# Patient Record
Sex: MALE | Race: WHITE | NOT HISPANIC OR LATINO | URBAN - METROPOLITAN AREA
[De-identification: names, ages, dates, MRNs, and addresses within clinical notes are randomized per-mention and may not be internally consistent; named-entity substitution may affect disease eponyms.]

---

## 2017-05-03 ENCOUNTER — OUTPATIENT (OUTPATIENT)
Dept: OUTPATIENT SERVICES | Facility: HOSPITAL | Age: 77
LOS: 1 days | End: 2017-05-03
Payer: COMMERCIAL

## 2017-05-03 DIAGNOSIS — R06.00 DYSPNEA, UNSPECIFIED: ICD-10-CM

## 2017-05-03 PROCEDURE — 78452 HT MUSCLE IMAGE SPECT MULT: CPT

## 2017-05-03 PROCEDURE — 93017 CV STRESS TEST TRACING ONLY: CPT

## 2017-05-03 PROCEDURE — A9502: CPT

## 2017-05-11 ENCOUNTER — INPATIENT (INPATIENT)
Facility: HOSPITAL | Age: 77
LOS: 0 days | Discharge: HOME | DRG: 247 | End: 2017-05-12
Attending: INTERNAL MEDICINE | Admitting: INTERNAL MEDICINE
Payer: COMMERCIAL

## 2017-05-11 ENCOUNTER — TRANSCRIPTION ENCOUNTER (OUTPATIENT)
Age: 77
End: 2017-05-11

## 2017-05-11 VITALS
DIASTOLIC BLOOD PRESSURE: 78 MMHG | SYSTOLIC BLOOD PRESSURE: 161 MMHG | HEART RATE: 50 BPM | RESPIRATION RATE: 18 BRPM | WEIGHT: 190.04 LBS | OXYGEN SATURATION: 99 % | TEMPERATURE: 98 F | HEIGHT: 70 IN

## 2017-05-11 DIAGNOSIS — R94.39 ABNORMAL RESULT OF OTHER CARDIOVASCULAR FUNCTION STUDY: ICD-10-CM

## 2017-05-11 DIAGNOSIS — Z95.5 PRESENCE OF CORONARY ANGIOPLASTY IMPLANT AND GRAFT: Chronic | ICD-10-CM

## 2017-05-11 LAB
ALBUMIN SERPL ELPH-MCNC: 4.5 G/DL — SIGNIFICANT CHANGE UP (ref 3.3–5)
ALP SERPL-CCNC: 49 U/L — SIGNIFICANT CHANGE UP (ref 40–120)
ALT FLD-CCNC: 26 U/L RC — SIGNIFICANT CHANGE UP (ref 10–45)
ANION GAP SERPL CALC-SCNC: 17 MMOL/L — SIGNIFICANT CHANGE UP (ref 5–17)
APTT BLD: 33.8 SEC — SIGNIFICANT CHANGE UP (ref 27.5–37.4)
AST SERPL-CCNC: 28 U/L — SIGNIFICANT CHANGE UP (ref 10–40)
BILIRUB SERPL-MCNC: 1.7 MG/DL — HIGH (ref 0.2–1.2)
BUN SERPL-MCNC: 17 MG/DL — SIGNIFICANT CHANGE UP (ref 7–23)
CALCIUM SERPL-MCNC: 10.1 MG/DL — SIGNIFICANT CHANGE UP (ref 8.4–10.5)
CHLORIDE SERPL-SCNC: 101 MMOL/L — SIGNIFICANT CHANGE UP (ref 96–108)
CO2 SERPL-SCNC: 23 MMOL/L — SIGNIFICANT CHANGE UP (ref 22–31)
CREAT SERPL-MCNC: 0.9 MG/DL — SIGNIFICANT CHANGE UP (ref 0.5–1.3)
GLUCOSE SERPL-MCNC: 163 MG/DL — HIGH (ref 70–99)
HCT VFR BLD CALC: 36.8 % — LOW (ref 39–50)
HGB BLD-MCNC: 12.5 G/DL — LOW (ref 13–17)
INR BLD: 1.42 RATIO — HIGH (ref 0.88–1.16)
MCHC RBC-ENTMCNC: 29 PG — SIGNIFICANT CHANGE UP (ref 27–34)
MCHC RBC-ENTMCNC: 33.9 GM/DL — SIGNIFICANT CHANGE UP (ref 32–36)
MCV RBC AUTO: 85.5 FL — SIGNIFICANT CHANGE UP (ref 80–100)
PLATELET # BLD AUTO: 213 K/UL — SIGNIFICANT CHANGE UP (ref 150–400)
POTASSIUM SERPL-MCNC: 4.2 MMOL/L — SIGNIFICANT CHANGE UP (ref 3.5–5.3)
POTASSIUM SERPL-SCNC: 4.2 MMOL/L — SIGNIFICANT CHANGE UP (ref 3.5–5.3)
PROT SERPL-MCNC: 7.1 G/DL — SIGNIFICANT CHANGE UP (ref 6–8.3)
PROTHROM AB SERPL-ACNC: 15.5 SEC — HIGH (ref 9.8–12.7)
RBC # BLD: 4.31 M/UL — SIGNIFICANT CHANGE UP (ref 4.2–5.8)
RBC # FLD: 15.9 % — HIGH (ref 10.3–14.5)
SODIUM SERPL-SCNC: 141 MMOL/L — SIGNIFICANT CHANGE UP (ref 135–145)
WBC # BLD: 8 K/UL — SIGNIFICANT CHANGE UP (ref 3.8–10.5)
WBC # FLD AUTO: 8 K/UL — SIGNIFICANT CHANGE UP (ref 3.8–10.5)

## 2017-05-11 PROCEDURE — 93010 ELECTROCARDIOGRAM REPORT: CPT

## 2017-05-11 RX ORDER — WARFARIN SODIUM 2.5 MG/1
5 TABLET ORAL ONCE
Qty: 0 | Refills: 0 | Status: DISCONTINUED | OUTPATIENT
Start: 2017-05-11 | End: 2017-05-11

## 2017-05-11 RX ORDER — INSULIN LISPRO 100/ML
VIAL (ML) SUBCUTANEOUS AT BEDTIME
Qty: 0 | Refills: 0 | Status: DISCONTINUED | OUTPATIENT
Start: 2017-05-11 | End: 2017-05-12

## 2017-05-11 RX ORDER — FLUTICASONE PROPIONATE 220 MCG
1 AEROSOL WITH ADAPTER (GRAM) INHALATION DAILY
Qty: 0 | Refills: 0 | Status: DISCONTINUED | OUTPATIENT
Start: 2017-05-11 | End: 2017-05-11

## 2017-05-11 RX ORDER — GLUCAGON INJECTION, SOLUTION 0.5 MG/.1ML
1 INJECTION, SOLUTION SUBCUTANEOUS ONCE
Qty: 0 | Refills: 0 | Status: DISCONTINUED | OUTPATIENT
Start: 2017-05-11 | End: 2017-05-12

## 2017-05-11 RX ORDER — INSULIN LISPRO 100/ML
VIAL (ML) SUBCUTANEOUS
Qty: 0 | Refills: 0 | Status: DISCONTINUED | OUTPATIENT
Start: 2017-05-11 | End: 2017-05-12

## 2017-05-11 RX ORDER — LISINOPRIL 2.5 MG/1
40 TABLET ORAL DAILY
Qty: 0 | Refills: 0 | Status: DISCONTINUED | OUTPATIENT
Start: 2017-05-11 | End: 2017-05-12

## 2017-05-11 RX ORDER — ATORVASTATIN CALCIUM 80 MG/1
40 TABLET, FILM COATED ORAL AT BEDTIME
Qty: 0 | Refills: 0 | Status: DISCONTINUED | OUTPATIENT
Start: 2017-05-11 | End: 2017-05-12

## 2017-05-11 RX ORDER — CLOPIDOGREL BISULFATE 75 MG/1
1 TABLET, FILM COATED ORAL
Qty: 90 | Refills: 3 | OUTPATIENT
Start: 2017-05-11 | End: 2018-05-05

## 2017-05-11 RX ORDER — DEXTROSE 50 % IN WATER 50 %
25 SYRINGE (ML) INTRAVENOUS ONCE
Qty: 0 | Refills: 0 | Status: DISCONTINUED | OUTPATIENT
Start: 2017-05-11 | End: 2017-05-12

## 2017-05-11 RX ORDER — CLOPIDOGREL BISULFATE 75 MG/1
75 TABLET, FILM COATED ORAL DAILY
Qty: 0 | Refills: 0 | Status: DISCONTINUED | OUTPATIENT
Start: 2017-05-12 | End: 2017-05-12

## 2017-05-11 RX ORDER — ASPIRIN/CALCIUM CARB/MAGNESIUM 324 MG
81 TABLET ORAL DAILY
Qty: 0 | Refills: 0 | Status: DISCONTINUED | OUTPATIENT
Start: 2017-05-11 | End: 2017-05-12

## 2017-05-11 RX ORDER — FLUTICASONE PROPIONATE 50 MCG
1 SPRAY, SUSPENSION NASAL DAILY
Qty: 0 | Refills: 0 | Status: DISCONTINUED | OUTPATIENT
Start: 2017-05-11 | End: 2017-05-12

## 2017-05-11 RX ORDER — DEXTROSE 50 % IN WATER 50 %
12.5 SYRINGE (ML) INTRAVENOUS ONCE
Qty: 0 | Refills: 0 | Status: DISCONTINUED | OUTPATIENT
Start: 2017-05-11 | End: 2017-05-12

## 2017-05-11 RX ORDER — METFORMIN HYDROCHLORIDE 850 MG/1
3 TABLET ORAL
Qty: 0 | Refills: 0 | COMMUNITY

## 2017-05-11 RX ORDER — METFORMIN HYDROCHLORIDE 850 MG/1
2 TABLET ORAL
Qty: 0 | Refills: 0 | COMMUNITY

## 2017-05-11 RX ORDER — METOPROLOL TARTRATE 50 MG
50 TABLET ORAL DAILY
Qty: 0 | Refills: 0 | Status: DISCONTINUED | OUTPATIENT
Start: 2017-05-11 | End: 2017-05-12

## 2017-05-11 RX ORDER — DEXTROSE 50 % IN WATER 50 %
1 SYRINGE (ML) INTRAVENOUS ONCE
Qty: 0 | Refills: 0 | Status: DISCONTINUED | OUTPATIENT
Start: 2017-05-11 | End: 2017-05-12

## 2017-05-11 RX ORDER — FUROSEMIDE 40 MG
20 TABLET ORAL DAILY
Qty: 0 | Refills: 0 | Status: DISCONTINUED | OUTPATIENT
Start: 2017-05-11 | End: 2017-05-12

## 2017-05-11 RX ORDER — AMLODIPINE BESYLATE 2.5 MG/1
5 TABLET ORAL DAILY
Qty: 0 | Refills: 0 | Status: DISCONTINUED | OUTPATIENT
Start: 2017-05-11 | End: 2017-05-12

## 2017-05-11 RX ORDER — SODIUM CHLORIDE 9 MG/ML
1000 INJECTION, SOLUTION INTRAVENOUS
Qty: 0 | Refills: 0 | Status: DISCONTINUED | OUTPATIENT
Start: 2017-05-11 | End: 2017-05-12

## 2017-05-11 RX ADMIN — Medication 2: at 18:15

## 2017-05-11 RX ADMIN — ATORVASTATIN CALCIUM 40 MILLIGRAM(S): 80 TABLET, FILM COATED ORAL at 22:05

## 2017-05-11 NOTE — DISCHARGE NOTE ADULT - HOSPITAL COURSE
77 yo male PMH afib on Coumadin last dose 5/7, CAD s/p 1 stent 16 years ago, HTN, HLD, cardiomegaly, DM type 2 managed by Dr. Jj Wilson A1c 7.5 uncomplicated, presented to cardiology, Dr. Russ, with progressing exertional dyspnea, mild  nonradiating 1-2/10 chest pressure, dizziness, peripheral edema over the past 6 months. Pt. denies palpitations, nausea, vomiting, diaphoresis, weight gain, or increased peripheral edema. Of note pt. states he has not been taking his Furosemide for the past week ue to urinary frequency. Pt. presents today asymptomatic for further evaluation and cardiac cath. 77 yo male PMH afib on Coumadin last dose 5/7, CAD s/p 1 stent 16 years ago, HTN, HLD, cardiomegaly, DM type 2 managed by Dr. Jj Wilson A1c 7.5 uncomplicated, presented to cardiology, Dr. Russ, with progressing exertional dyspnea, mild  nonradiating 1-2/10 chest pressure, dizziness, peripheral edema over the past 6 months. Pt. denies palpitations, nausea, vomiting, diaphoresis, weight gain, or increased peripheral edema. Of note pt. states he has not been taking his Furosemide for the past week ue to urinary frequency. Pt. presents today asymptomatic for further evaluation and cardiac cath.  Pt s/p PCI: DAYANA x1 to LAD via R radial. Pt tolerated procedure well with no post complications and hospitalization remained uneventful. Pt is hemodynamically stable and insertion/incision site benign. No c/o chest pain or SOB. Discharge teaching provided to Pt/caretaker and verbalized understanding the instruction. Pt is stable for discharge home as per attending.

## 2017-05-11 NOTE — DISCHARGE NOTE ADULT - CARE PLAN
Principal Discharge DX:	Coronary artery disease due to lipid rich plaque  Goal:	Pt remains chest pain free and understands post cath discharge instructions  Instructions for follow-up, activity and diet:	Do not stop you Aspirin or Plavix unless instructed to do so by your cardiologist.   No heavy lifting or pushing/pulling with procedure arm for 2 weeks. No driving for 2 days. You may shower 24 hours following the procedure but avoid baths/swimming for 1 week. Check your wrist site for bleeding and/or swelling daily following procedure and call your doctor immediately if it occurs or if you experience increased pain at the site. Follow up with your cardiologist in 1-2 weeks. You may call [facility] if you have any questions/concerns regarding your procedure [phone].   Lifestyle modification: include healthy habits to prevent stroke and future CAD  Low salt, low fat diet.   Weight management.   Take medications as prescribed.    No smoking.  Follow up with your cardiologist or primary doctor in 1- 2 weeks.  Secondary Diagnosis:	Chronic atrial fibrillation  Goal:	Your heart rate and rhythm will be controlled.  Instructions for follow-up, activity and diet:	Continue with your cardiologist and primary care MD. Continue your current medications. Call your physician for palpitations, feelings of rapid heart beat, lightheadedness, or dizziness. If you are on warfarin (Coumadin), have your blood work drawn (prescription given) on 5/11/17. Ask your nurse for written material about Coumadin and to provide patient education before discharge.  If you are on Xarelto/Rivaroxaban, take this medication daily as prescribed by your health care provider.   Take this medication with food to prevent upset stomach. If you miss a dose call your health care provider or pharmacist right away. Tell your doctor you use this drug before you have a spinal or epidural procedure  Tell dentists, surgeon, and other doctors that you use this drug. You may bleed more easily.  Be careful and avoid injury.  Use a soft toothbrush and an electric razor. Principal Discharge DX:	Coronary artery disease due to lipid rich plaque  Goal:	Pt remains chest pain free and understands post cath discharge instructions  Instructions for follow-up, activity and diet:	Do not stop you Aspirin or Plavix unless instructed to do so by your cardiologist.   No heavy lifting or pushing/pulling with procedure arm for 2 weeks. No driving for 2 days. You may shower 24 hours following the procedure but avoid baths/swimming for 1 week. Check your wrist site for bleeding and/or swelling daily following procedure and call your doctor immediately if it occurs or if you experience increased pain at the site. Follow up with your cardiologist in 1-2 weeks. You may call [facility] if you have any questions/concerns regarding your procedure [phone].   Lifestyle modification: include healthy habits to prevent stroke and future CAD  Low salt, low fat diet.   Weight management.   Take medications as prescribed.    No smoking.  Follow up with your cardiologist or primary doctor in 1- 2 weeks.  Secondary Diagnosis:	Chronic atrial fibrillation  Goal:	Your heart rate and rhythm will be controlled.  Instructions for follow-up, activity and diet:	Continue with your cardiologist and primary care MD. Continue your current medications. Call your physician for palpitations, feelings of rapid heart beat, lightheadedness, or dizziness. If you are on warfarin (Coumadin), have your blood work drawn (prescription given) on 5/11/17. Ask your nurse for written material about Coumadin and to provide patient education before discharge.  If you are on Xarelto/Rivaroxaban, take this medication daily as prescribed by your health care provider.   Take this medication with food to prevent upset stomach. If you miss a dose call your health care provider or pharmacist right away. Tell your doctor you use this drug before you have a spinal or epidural procedure  Tell dentists, surgeon, and other doctors that you use this drug. You may bleed more easily.  Be careful and avoid injury.  Use a soft toothbrush and an electric razor. Principal Discharge DX:	Coronary artery disease due to lipid rich plaque  Goal:	Pt remains chest pain free and understands post cath discharge instructions  Instructions for follow-up, activity and diet:	Do not stop you Aspirin or Plavix unless instructed to do so by your cardiologist.   No heavy lifting or pushing/pulling with procedure arm for 2 weeks. No driving for 2 days. You may shower 24 hours following the procedure but avoid baths/swimming for 1 week. Check your wrist site for bleeding and/or swelling daily following procedure and call your doctor immediately if it occurs or if you experience increased pain at the site. Follow up with your cardiologist in 1-2 weeks. You may call [facility] if you have any questions/concerns regarding your procedure [phone].   Lifestyle modification: include healthy habits to prevent stroke and future CAD  Low salt, low fat diet.   Weight management.   Take medications as prescribed.    No smoking.  Follow up with your cardiologist or primary doctor in 1- 2 weeks.  Secondary Diagnosis:	Chronic atrial fibrillation  Goal:	Your heart rate and rhythm will be controlled.  Instructions for follow-up, activity and diet:	Continue with your cardiologist and primary care MD. Continue your current medications. Call your physician for palpitations, feelings of rapid heart beat, lightheadedness, or dizziness. If you are on warfarin (Coumadin), have your blood work drawn (prescription given) on 5/11/17. Ask your nurse for written material about Coumadin and to provide patient education before discharge.  If you are on Xarelto/Rivaroxaban, take this medication daily as prescribed by your health care provider.   Take this medication with food to prevent upset stomach. If you miss a dose call your health care provider or pharmacist right away. Tell your doctor you use this drug before you have a spinal or epidural procedure  Tell dentists, surgeon, and other doctors that you use this drug. You may bleed more easily.  Be careful and avoid injury.  Use a soft toothbrush and an electric razor. Principal Discharge DX:	Coronary artery disease due to lipid rich plaque  Goal:	Pt remains chest pain free and understands post cath discharge instructions  Instructions for follow-up, activity and diet:	Do not stop you Aspirin or Plavix unless instructed to do so by your cardiologist.   No heavy lifting or pushing/pulling with procedure arm for 2 weeks. No driving for 2 days. You may shower 24 hours following the procedure but avoid baths/swimming for 1 week. Check your wrist site for bleeding and/or swelling daily following procedure and call your doctor immediately if it occurs or if you experience increased pain at the site. Follow up with your cardiologist in 1-2 weeks. You may call [facility] if you have any questions/concerns regarding your procedure [phone].   Lifestyle modification: include healthy habits to prevent stroke and future CAD  Low salt, low fat diet.   Weight management.   Take medications as prescribed.    No smoking.  Follow up with your cardiologist or primary doctor in 1- 2 weeks.  Secondary Diagnosis:	Chronic atrial fibrillation  Goal:	Your heart rate and rhythm will be controlled.  Instructions for follow-up, activity and diet:	Continue with your cardiologist and primary care MD. Continue your current medications. Call your physician for palpitations, feelings of rapid heart beat, lightheadedness, or dizziness. If you are on warfarin (Coumadin), have your blood work drawn (prescription given) on 5/11/17. Ask your nurse for written material about Coumadin and to provide patient education before discharge.  If you are on Xarelto/Rivaroxaban, take this medication daily as prescribed by your health care provider.   Take this medication with food to prevent upset stomach. If you miss a dose call your health care provider or pharmacist right away. Tell your doctor you use this drug before you have a spinal or epidural procedure  Tell dentists, surgeon, and other doctors that you use this drug. You may bleed more easily.  Be careful and avoid injury.  Use a soft toothbrush and an electric razor. Principal Discharge DX:	Coronary artery disease due to lipid rich plaque  Goal:	Pt remains chest pain free and understands post cath discharge instructions  Instructions for follow-up, activity and diet:	Do not stop you Aspirin or Plavix unless instructed to do so by your cardiologist.   No heavy lifting or pushing/pulling with procedure arm for 2 weeks. No driving for 2 days. You may shower 24 hours following the procedure but avoid baths/swimming for 1 week. Check your wrist site for bleeding and/or swelling daily following procedure and call your doctor immediately if it occurs or if you experience increased pain at the site. Follow up with your cardiologist in 1-2 weeks. You may call [facility] if you have any questions/concerns regarding your procedure [phone].   Lifestyle modification: include healthy habits to prevent stroke and future CAD  Low salt, low fat diet.   Weight management.   Take medications as prescribed.    No smoking.  Follow up with your cardiologist or primary doctor in 1- 2 weeks.  Secondary Diagnosis:	Chronic atrial fibrillation  Goal:	Your heart rate and rhythm will be controlled.  Instructions for follow-up, activity and diet:	Continue with your cardiologist and primary care MD. Continue your current medications. Call your physician for palpitations, feelings of rapid heart beat, lightheadedness, or dizziness. If you are on warfarin (Coumadin), have your blood work drawn (prescription given) on 5/11/17. Ask your nurse for written material about Coumadin and to provide patient education before discharge.  If you are on Xarelto/Rivaroxaban, take this medication daily as prescribed by your health care provider.   Take this medication with food to prevent upset stomach. If you miss a dose call your health care provider or pharmacist right away. Tell your doctor you use this drug before you have a spinal or epidural procedure  Tell dentists, surgeon, and other doctors that you use this drug. You may bleed more easily.  Be careful and avoid injury.  Use a soft toothbrush and an electric razor. Principal Discharge DX:	Coronary artery disease due to lipid rich plaque  Goal:	Pt remains chest pain free and understands post cath discharge instructions  Instructions for follow-up, activity and diet:	Do not stop you Aspirin or Plavix unless instructed to do so by your cardiologist.   No heavy lifting or pushing/pulling with procedure arm for 2 weeks. No driving for 2 days. You may shower 24 hours following the procedure but avoid baths/swimming for 1 week. Check your wrist site for bleeding and/or swelling daily following procedure and call your doctor immediately if it occurs or if you experience increased pain at the site. Follow up with your cardiologist in 1-2 weeks. You may call [facility] if you have any questions/concerns regarding your procedure [phone].   Lifestyle modification: include healthy habits to prevent stroke and future CAD  Low salt, low fat diet.   Weight management.   Take medications as prescribed.    No smoking.  Follow up with your cardiologist or primary doctor in 1- 2 weeks.  Secondary Diagnosis:	Chronic atrial fibrillation  Goal:	Your heart rate and rhythm will be controlled.  Instructions for follow-up, activity and diet:	Continue with your cardiologist and primary care MD. Continue your current medications. Call your physician for palpitations, feelings of rapid heart beat, lightheadedness, or dizziness. If you are on warfarin (Coumadin), have your blood work drawn (prescription given) on 5/11/17. Ask your nurse for written material about Coumadin and to provide patient education before discharge.  If you are on Xarelto/Rivaroxaban, take this medication daily as prescribed by your health care provider.   Take this medication with food to prevent upset stomach. If you miss a dose call your health care provider or pharmacist right away. Tell your doctor you use this drug before you have a spinal or epidural procedure  Tell dentists, surgeon, and other doctors that you use this drug. You may bleed more easily.  Be careful and avoid injury.  Use a soft toothbrush and an electric razor.

## 2017-05-11 NOTE — DISCHARGE NOTE ADULT - MEDICATION SUMMARY - MEDICATIONS TO CHANGE
I will SWITCH the dose or number of times a day I take the medications listed below when I get home from the hospital:  None I will SWITCH the dose or number of times a day I take the medications listed below when I get home from the hospital:    Metoprolol Succinate ER 50 mg oral tablet, extended release  -- 1 tab(s) by mouth once a day

## 2017-05-11 NOTE — H&P CARDIOLOGY - HISTORY OF PRESENT ILLNESS
This is a 75 yo male PMH afib on Coumadin last dose 5/7, CAD s/p 1 stent 16 years ago, HTN, HLD, cardiomegaly, DM type 2 managed by Dr. Jj Wilson A1c 7.5 uncomplicated, presented to cardiology, Dr. Russ, with progressing exertional dyspnea, mild  nonradiating 1-2/10 chest pressure, dizziness, peripheral edema over the past 6 months. Pt. denies palpitations, nausea, vomiting, diaphoresis, weight gain, or increased peripheral edema. Pt. presents today asymptomatic for further evaluation and cardiac cath.    NST performed performed 5/3/17 revealed  NUCLEAR FINDINGS:  Review of raw data shows: Diaphragmatic artifact.  The left ventricle was normal in size. There is a medium  sized, moderate to severe defect in anterior wall that is  minimally reversible, suggestive of infarction with  cori-infarct ischemia.There is also a medium sized,  moderate to severe defect in inferior wall that is  partially reversible, suggestive of infarction with  cori-infarct ischemia.  ------------------------------------------------------------------------  GATED ANALYSIS:  Global hypokinesis with severe hypokinesis of mid and  distal inferior wall. Left ventricular dysfunction: 40%.  ------------------------------------------------------------------------  LV/RV OBSERVATION:  Moderately depressed LVEF  ------------------------------------------------------------------------  IMPRESSIONS:Abnormal Study  * The left ventricle was normal in size.  * Tracer uptake was homogeneous throughout the left  ventricle.  * Abnormal study; There is a medium sized, moderate to  severe defect in anterior wall that is minimally  reversible, suggestive of infarction with cori-infarct  ischemia.There is also a medium sized, moderate to severe  defect in inferior wall that is partially reversible,  suggestive of infarction with cori-infarct ischemia.  * Global hypokinesis with severe hypokinesis of mid and  distal inferior wall. Left ventricular dysfunction: 40%.  * Normal Regadenoson ECG. This is a 75 yo male PMH afib on Coumadin last dose 5/7, CAD s/p 1 stent 16 years ago, HTN, HLD, cardiomegaly, DM type 2 managed by Dr. Jj Wilson A1c 7.5 uncomplicated, presented to cardiology, Dr. Russ, with progressing exertional dyspnea, mild  nonradiating 1-2/10 chest pressure, dizziness, peripheral edema over the past 6 months. Pt. denies palpitations, nausea, vomiting, diaphoresis, weight gain, or increased peripheral edema. Pt. presents today asymptomatic for further evaluation and cardiac cath.    Antianginal regimen:  Metoprolol, Amlodipine    NST performed performed 5/3/17 revealed  NUCLEAR FINDINGS:  Review of raw data shows: Diaphragmatic artifact.  The left ventricle was normal in size. There is a medium  sized, moderate to severe defect in anterior wall that is  minimally reversible, suggestive of infarction with  cori-infarct ischemia.There is also a medium sized,  moderate to severe defect in inferior wall that is  partially reversible, suggestive of infarction with  cori-infarct ischemia.  ------------------------------------------------------------------------  GATED ANALYSIS:  Global hypokinesis with severe hypokinesis of mid and  distal inferior wall. Left ventricular dysfunction: 40%.  ------------------------------------------------------------------------  LV/RV OBSERVATION:  Moderately depressed LVEF  ------------------------------------------------------------------------  IMPRESSIONS:Abnormal Study  * The left ventricle was normal in size.  * Tracer uptake was homogeneous throughout the left  ventricle.  * Abnormal study; There is a medium sized, moderate to  severe defect in anterior wall that is minimally  reversible, suggestive of infarction with cori-infarct  ischemia.There is also a medium sized, moderate to severe  defect in inferior wall that is partially reversible,  suggestive of infarction with cori-infarct ischemia.  * Global hypokinesis with severe hypokinesis of mid and  distal inferior wall. Left ventricular dysfunction: 40%.  * Normal Regadenoson ECG. This is a 75 yo male PMH afib on Coumadin last dose 5/7, CAD s/p 1 stent 16 years ago, HTN, HLD, cardiomegaly, DM type 2 managed by Dr. Jj Wilson A1c 7.5 uncomplicated, presented to cardiology, Dr. Russ, with progressing exertional dyspnea, mild  nonradiating 1-2/10 chest pressure, dizziness, peripheral edema over the past 6 months. Pt. denies palpitations, nausea, vomiting, diaphoresis, weight gain, or increased peripheral edema. Of note pt. states he has not been taking his Furosemide for the past week ue to urinary frequency. Pt. presents today asymptomatic for further evaluation and cardiac cath.    Antianginal regimen:  Metoprolol, Amlodipine    NST performed performed 5/3/17 revealed  NUCLEAR FINDINGS:  Review of raw data shows: Diaphragmatic artifact.  The left ventricle was normal in size. There is a medium  sized, moderate to severe defect in anterior wall that is  minimally reversible, suggestive of infarction with  cori-infarct ischemia.There is also a medium sized,  moderate to severe defect in inferior wall that is  partially reversible, suggestive of infarction with  cori-infarct ischemia.  ------------------------------------------------------------------------  GATED ANALYSIS:  Global hypokinesis with severe hypokinesis of mid and  distal inferior wall. Left ventricular dysfunction: 40%.  ------------------------------------------------------------------------  LV/RV OBSERVATION:  Moderately depressed LVEF  ------------------------------------------------------------------------  IMPRESSIONS:Abnormal Study  * The left ventricle was normal in size.  * Tracer uptake was homogeneous throughout the left  ventricle.  * Abnormal study; There is a medium sized, moderate to  severe defect in anterior wall that is minimally  reversible, suggestive of infarction with cori-infarct  ischemia.There is also a medium sized, moderate to severe  defect in inferior wall that is partially reversible,  suggestive of infarction with cori-infarct ischemia.  * Global hypokinesis with severe hypokinesis of mid and  distal inferior wall. Left ventricular dysfunction: 40%.  * Normal Regadenoson ECG.

## 2017-05-11 NOTE — DISCHARGE NOTE ADULT - CARE PROVIDER_API CALL
Alex Russ), Cardiovascular Disease; Internal Medicine  [address] [place] [address]  Phone: [phone]  Fax: [phone]

## 2017-05-11 NOTE — DISCHARGE NOTE ADULT - MEDICATION SUMMARY - MEDICATIONS TO TAKE
I will START or STAY ON the medications listed below when I get home from the hospital:    sildenafil 50 mg oral tablet  -- 1 tab(s) by mouth once a day, As Needed  -- Indication: For home med    aspirin 81 mg oral tablet, chewable  -- 1 tab(s) by mouth once a day  -- Indication: For CAD (coronary artery disease)    lisinopril 40 mg oral tablet  -- 1 tab(s) by mouth once a day  -- Indication: For htn    Coumadin  -- 6 milligram(s) by mouth once a day  Fri/Sat/Sun  -- Indication: For afib    Coumadin 5 mg oral tablet  -- 1 tab(s) by mouth once a day  Mon/Tues/Wed/Thurs  -- Indication: For afib    pioglitazone 15 mg oral tablet  -- 1 tab(s) by mouth once a day  last dose 5/10/17  -- Indication: For dm    repaglinide 1 mg oral tablet  -- 1 tab(s) by mouth 3 times a day (before meals)  last dose 5/10/17  -- Indication: For dm    metFORMIN 500 mg oral tablet  -- 2 tab(s) by mouth once a day (at bedtime)  Restart on 5/14/17  -- Indication: For dm    metFORMIN 500 mg oral tablet  -- 3 tab(s) by mouth once a day AM dose  Restart on 5/14/17  -- Indication: For dm    atorvastatin 40 mg oral tablet  -- 1 tab(s) by mouth once a day  -- Indication: For hld    clopidogrel 75 mg oral tablet  -- 1 tab(s) by mouth once a day MDD:1  -- Indication: For CAD (coronary artery disease)    Metoprolol Succinate ER 50 mg oral tablet, extended release  -- 1 tab(s) by mouth once a day  -- Indication: For htn    amLODIPine 5 mg oral tablet  -- 1 tab(s) by mouth once a day  -- Indication: For htn    furosemide 20 mg oral tablet  -- 1 tab(s) by mouth once a day  Mon/Wed/Fridays  -- Indication: For htn    fluticasone propionate  -- 50 microgram(s) into nose once a day, As Needed  -- Indication: For home med I will START or STAY ON the medications listed below when I get home from the hospital:    sildenafil 50 mg oral tablet  -- 1 tab(s) by mouth once a day, As Needed  -- Indication: For home med    aspirin 81 mg oral tablet, chewable  -- 1 tab(s) by mouth once a day  -- Indication: For CAD (coronary artery disease)    lisinopril 40 mg oral tablet  -- 1 tab(s) by mouth once a day  -- Indication: For htn    Coumadin  -- 6 milligram(s) by mouth once a day  Fri/Sat/Sun  -- Indication: For afib    Coumadin 5 mg oral tablet  -- 1 tab(s) by mouth once a day  Mon/Tues/Wed/Thurs  -- Indication: For afib    pioglitazone 15 mg oral tablet  -- 1 tab(s) by mouth once a day  last dose 5/10/17  -- Indication: For dm    repaglinide 1 mg oral tablet  -- 1 tab(s) by mouth 3 times a day (before meals)  last dose 5/10/17  -- Indication: For dm    metFORMIN 500 mg oral tablet  -- 2 tab(s) by mouth once a day (at bedtime)  Restart on 5/14/17  -- Indication: For dm    metFORMIN 500 mg oral tablet  -- 3 tab(s) by mouth once a day AM dose  Restart on 5/14/17  -- Indication: For dm    atorvastatin 40 mg oral tablet  -- 1 tab(s) by mouth once a day  -- Indication: For hld    clopidogrel 75 mg oral tablet  -- 1 tab(s) by mouth once a day MDD:1  -- Indication: For CAD (coronary artery disease)    metoprolol succinate 25 mg oral tablet, extended release  -- 1 tab(s) by mouth once a day MDD:1  -- Indication: For Htn    amLODIPine 5 mg oral tablet  -- 1 tab(s) by mouth once a day  -- Indication: For htn    furosemide 20 mg oral tablet  -- 1 tab(s) by mouth once a day  Mon/Wed/Fridays  -- Indication: For htn    fluticasone propionate  -- 50 microgram(s) into nose once a day, As Needed  -- Indication: For home med

## 2017-05-11 NOTE — DISCHARGE NOTE ADULT - PLAN OF CARE
Pt remains chest pain free and understands post cath discharge instructions Do not stop you Aspirin or Plavix unless instructed to do so by your cardiologist.   No heavy lifting or pushing/pulling with procedure arm for 2 weeks. No driving for 2 days. You may shower 24 hours following the procedure but avoid baths/swimming for 1 week. Check your wrist site for bleeding and/or swelling daily following procedure and call your doctor immediately if it occurs or if you experience increased pain at the site. Follow up with your cardiologist in 1-2 weeks. You may call [facility] if you have any questions/concerns regarding your procedure [phone].   Lifestyle modification: include healthy habits to prevent stroke and future CAD  Low salt, low fat diet.   Weight management.   Take medications as prescribed.    No smoking.  Follow up with your cardiologist or primary doctor in 1- 2 weeks. Your heart rate and rhythm will be controlled. Continue with your cardiologist and primary care MD. Continue your current medications. Call your physician for palpitations, feelings of rapid heart beat, lightheadedness, or dizziness. If you are on warfarin (Coumadin), have your blood work drawn (prescription given) on 5/11/17. Ask your nurse for written material about Coumadin and to provide patient education before discharge.  If you are on Xarelto/Rivaroxaban, take this medication daily as prescribed by your health care provider.   Take this medication with food to prevent upset stomach. If you miss a dose call your health care provider or pharmacist right away. Tell your doctor you use this drug before you have a spinal or epidural procedure  Tell dentists, surgeon, and other doctors that you use this drug. You may bleed more easily.  Be careful and avoid injury.  Use a soft toothbrush and an electric razor.

## 2017-05-11 NOTE — DISCHARGE NOTE ADULT - PATIENT PORTAL LINK FT
“You can access the Follow[employer] Patient Portal, offered by [facility], by registering with the following website: [email or web address]”

## 2017-05-11 NOTE — H&P CARDIOLOGY - PMH
Afib    CAD (coronary artery disease)    Diabetes    High cholesterol    HTN (hypertension)    Prostatitis syndrome    Stented coronary artery    UTI (urinary tract infection)

## 2017-05-12 VITALS
TEMPERATURE: 98 F | SYSTOLIC BLOOD PRESSURE: 129 MMHG | OXYGEN SATURATION: 95 % | RESPIRATION RATE: 17 BRPM | DIASTOLIC BLOOD PRESSURE: 72 MMHG | HEART RATE: 45 BPM

## 2017-05-12 LAB
APTT BLD: 32.7 SEC — SIGNIFICANT CHANGE UP (ref 27.5–37.4)
BASOPHILS # BLD AUTO: 0.1 K/UL — SIGNIFICANT CHANGE UP (ref 0–0.2)
BASOPHILS NFR BLD AUTO: 0.9 % — SIGNIFICANT CHANGE UP (ref 0–2)
BUN SERPL-MCNC: 14 MG/DL — SIGNIFICANT CHANGE UP (ref 7–23)
CALCIUM SERPL-MCNC: 8.6 MG/DL — SIGNIFICANT CHANGE UP (ref 8.4–10.5)
CHLORIDE SERPL-SCNC: 106 MMOL/L — SIGNIFICANT CHANGE UP (ref 96–108)
CO2 SERPL-SCNC: 21 MMOL/L — LOW (ref 22–31)
CREAT SERPL-MCNC: 0.81 MG/DL — SIGNIFICANT CHANGE UP (ref 0.5–1.3)
EOSINOPHIL # BLD AUTO: 0.4 K/UL — SIGNIFICANT CHANGE UP (ref 0–0.5)
EOSINOPHIL NFR BLD AUTO: 5.4 % — SIGNIFICANT CHANGE UP (ref 0–6)
GLUCOSE SERPL-MCNC: 132 MG/DL — HIGH (ref 70–99)
HBA1C BLD-MCNC: 6.7 % — HIGH (ref 4–5.6)
HBA1C BLD-MCNC: 7 % — HIGH (ref 4–5.6)
HCT VFR BLD CALC: 35.1 % — LOW (ref 39–50)
HGB BLD-MCNC: 11.6 G/DL — LOW (ref 13–17)
INR BLD: 1.36 RATIO — HIGH (ref 0.88–1.16)
LYMPHOCYTES # BLD AUTO: 1.4 K/UL — SIGNIFICANT CHANGE UP (ref 1–3.3)
LYMPHOCYTES # BLD AUTO: 21.2 % — SIGNIFICANT CHANGE UP (ref 13–44)
MCHC RBC-ENTMCNC: 29.1 PG — SIGNIFICANT CHANGE UP (ref 27–34)
MCHC RBC-ENTMCNC: 33 GM/DL — SIGNIFICANT CHANGE UP (ref 32–36)
MCV RBC AUTO: 88.1 FL — SIGNIFICANT CHANGE UP (ref 80–100)
MONOCYTES # BLD AUTO: 0.7 K/UL — SIGNIFICANT CHANGE UP (ref 0–0.9)
MONOCYTES NFR BLD AUTO: 10.1 % — SIGNIFICANT CHANGE UP (ref 2–14)
NEUTROPHILS # BLD AUTO: 4.1 K/UL — SIGNIFICANT CHANGE UP (ref 1.8–7.4)
NEUTROPHILS NFR BLD AUTO: 62.5 % — SIGNIFICANT CHANGE UP (ref 43–77)
PLATELET # BLD AUTO: 200 K/UL — SIGNIFICANT CHANGE UP (ref 150–400)
POTASSIUM SERPL-MCNC: 3.6 MMOL/L — SIGNIFICANT CHANGE UP (ref 3.5–5.3)
POTASSIUM SERPL-SCNC: 3.6 MMOL/L — SIGNIFICANT CHANGE UP (ref 3.5–5.3)
PROTHROM AB SERPL-ACNC: 14.8 SEC — HIGH (ref 9.8–12.7)
RBC # BLD: 3.98 M/UL — LOW (ref 4.2–5.8)
RBC # FLD: 15.9 % — HIGH (ref 10.3–14.5)
SODIUM SERPL-SCNC: 141 MMOL/L — SIGNIFICANT CHANGE UP (ref 135–145)
WBC # BLD: 6.6 K/UL — SIGNIFICANT CHANGE UP (ref 3.8–10.5)
WBC # FLD AUTO: 6.6 K/UL — SIGNIFICANT CHANGE UP (ref 3.8–10.5)

## 2017-05-12 PROCEDURE — C1725: CPT

## 2017-05-12 PROCEDURE — C1894: CPT

## 2017-05-12 PROCEDURE — 85730 THROMBOPLASTIN TIME PARTIAL: CPT

## 2017-05-12 PROCEDURE — 93005 ELECTROCARDIOGRAM TRACING: CPT

## 2017-05-12 PROCEDURE — 85610 PROTHROMBIN TIME: CPT

## 2017-05-12 PROCEDURE — 80053 COMPREHEN METABOLIC PANEL: CPT

## 2017-05-12 PROCEDURE — 93454 CORONARY ARTERY ANGIO S&I: CPT | Mod: 59

## 2017-05-12 PROCEDURE — C9600: CPT | Mod: LD

## 2017-05-12 PROCEDURE — C1887: CPT

## 2017-05-12 PROCEDURE — 85027 COMPLETE CBC AUTOMATED: CPT

## 2017-05-12 PROCEDURE — 93010 ELECTROCARDIOGRAM REPORT: CPT

## 2017-05-12 PROCEDURE — 80048 BASIC METABOLIC PNL TOTAL CA: CPT

## 2017-05-12 PROCEDURE — C1874: CPT

## 2017-05-12 PROCEDURE — C1769: CPT

## 2017-05-12 PROCEDURE — 83036 HEMOGLOBIN GLYCOSYLATED A1C: CPT

## 2017-05-12 RX ORDER — METOPROLOL TARTRATE 50 MG
25 TABLET ORAL DAILY
Qty: 0 | Refills: 0 | Status: DISCONTINUED | OUTPATIENT
Start: 2017-05-12 | End: 2017-05-12

## 2017-05-12 RX ORDER — POTASSIUM CHLORIDE 20 MEQ
40 PACKET (EA) ORAL ONCE
Qty: 0 | Refills: 0 | Status: COMPLETED | OUTPATIENT
Start: 2017-05-12 | End: 2017-05-12

## 2017-05-12 RX ORDER — CLOPIDOGREL BISULFATE 75 MG/1
1 TABLET, FILM COATED ORAL
Qty: 90 | Refills: 3 | OUTPATIENT
Start: 2017-05-12 | End: 2018-05-06

## 2017-05-12 RX ORDER — METOPROLOL TARTRATE 50 MG
1 TABLET ORAL
Qty: 0 | Refills: 0 | COMMUNITY

## 2017-05-12 RX ORDER — METOPROLOL TARTRATE 50 MG
1 TABLET ORAL
Qty: 30 | Refills: 0 | OUTPATIENT
Start: 2017-05-12 | End: 2017-06-11

## 2017-05-12 RX ADMIN — Medication 81 MILLIGRAM(S): at 05:59

## 2017-05-12 RX ADMIN — CLOPIDOGREL BISULFATE 75 MILLIGRAM(S): 75 TABLET, FILM COATED ORAL at 06:01

## 2017-05-12 RX ADMIN — Medication 40 MILLIEQUIVALENT(S): at 06:00

## 2017-05-12 RX ADMIN — AMLODIPINE BESYLATE 5 MILLIGRAM(S): 2.5 TABLET ORAL at 05:59

## 2018-05-29 PROBLEM — N39.0 URINARY TRACT INFECTION, SITE NOT SPECIFIED: Chronic | Status: ACTIVE | Noted: 2017-05-11

## 2018-05-29 PROBLEM — I25.10 ATHEROSCLEROTIC HEART DISEASE OF NATIVE CORONARY ARTERY WITHOUT ANGINA PECTORIS: Chronic | Status: ACTIVE | Noted: 2017-05-11

## 2018-05-29 PROBLEM — N42.82: Chronic | Status: ACTIVE | Noted: 2017-05-11

## 2018-05-30 ENCOUNTER — APPOINTMENT (OUTPATIENT)
Dept: UROLOGY | Facility: CLINIC | Age: 78
End: 2018-05-30
Payer: COMMERCIAL

## 2018-05-30 DIAGNOSIS — N40.0 BENIGN PROSTATIC HYPERPLASIA WITHOUT LOWER URINARY TRACT SYMPMS: ICD-10-CM

## 2018-05-30 DIAGNOSIS — N45.1 EPIDIDYMITIS: ICD-10-CM

## 2018-05-30 PROCEDURE — 99213 OFFICE O/P EST LOW 20 MIN: CPT

## 2018-05-30 RX ORDER — FINASTERIDE 5 MG/1
5 TABLET, FILM COATED ORAL
Qty: 90 | Refills: 2 | Status: ACTIVE | COMMUNITY
Start: 2018-05-30 | End: 1900-01-01

## 2018-11-30 ENCOUNTER — APPOINTMENT (OUTPATIENT)
Dept: UROLOGY | Facility: CLINIC | Age: 78
End: 2018-11-30

## 2019-01-15 ENCOUNTER — INPATIENT (INPATIENT)
Facility: HOSPITAL | Age: 79
LOS: 13 days | DRG: 291 | End: 2019-01-29
Attending: STUDENT IN AN ORGANIZED HEALTH CARE EDUCATION/TRAINING PROGRAM | Admitting: HOSPITALIST
Payer: COMMERCIAL

## 2019-01-15 VITALS
RESPIRATION RATE: 18 BRPM | DIASTOLIC BLOOD PRESSURE: 70 MMHG | WEIGHT: 184.97 LBS | HEIGHT: 70 IN | SYSTOLIC BLOOD PRESSURE: 139 MMHG | HEART RATE: 62 BPM | OXYGEN SATURATION: 98 %

## 2019-01-15 DIAGNOSIS — I10 ESSENTIAL (PRIMARY) HYPERTENSION: ICD-10-CM

## 2019-01-15 DIAGNOSIS — Z95.5 PRESENCE OF CORONARY ANGIOPLASTY IMPLANT AND GRAFT: Chronic | ICD-10-CM

## 2019-01-15 DIAGNOSIS — E87.2 ACIDOSIS: ICD-10-CM

## 2019-01-15 DIAGNOSIS — I50.9 HEART FAILURE, UNSPECIFIED: ICD-10-CM

## 2019-01-15 DIAGNOSIS — R60.9 EDEMA, UNSPECIFIED: ICD-10-CM

## 2019-01-15 DIAGNOSIS — N17.9 ACUTE KIDNEY FAILURE, UNSPECIFIED: ICD-10-CM

## 2019-01-15 LAB
ALBUMIN SERPL ELPH-MCNC: 3.7 G/DL — SIGNIFICANT CHANGE UP (ref 3.3–5)
ALP SERPL-CCNC: 85 U/L — SIGNIFICANT CHANGE UP (ref 40–120)
ALT FLD-CCNC: 43 U/L — SIGNIFICANT CHANGE UP (ref 10–45)
ANION GAP SERPL CALC-SCNC: 18 MMOL/L — HIGH (ref 5–17)
APTT BLD: 58.8 SEC — HIGH (ref 27.5–36.3)
AST SERPL-CCNC: 42 U/L — HIGH (ref 10–40)
BASOPHILS # BLD AUTO: 0 K/UL — SIGNIFICANT CHANGE UP (ref 0–0.2)
BASOPHILS NFR BLD AUTO: 0.3 % — SIGNIFICANT CHANGE UP (ref 0–2)
BILIRUB SERPL-MCNC: 1.8 MG/DL — HIGH (ref 0.2–1.2)
BUN SERPL-MCNC: 68 MG/DL — HIGH (ref 7–23)
CALCIUM SERPL-MCNC: 9.2 MG/DL — SIGNIFICANT CHANGE UP (ref 8.4–10.5)
CHLORIDE SERPL-SCNC: 109 MMOL/L — HIGH (ref 96–108)
CO2 SERPL-SCNC: 14 MMOL/L — LOW (ref 22–31)
CREAT SERPL-MCNC: 3.74 MG/DL — HIGH (ref 0.5–1.3)
EOSINOPHIL # BLD AUTO: 0.1 K/UL — SIGNIFICANT CHANGE UP (ref 0–0.5)
EOSINOPHIL NFR BLD AUTO: 2.1 % — SIGNIFICANT CHANGE UP (ref 0–6)
GAS PNL BLDV: SIGNIFICANT CHANGE UP
GLUCOSE SERPL-MCNC: 133 MG/DL — HIGH (ref 70–99)
HCT VFR BLD CALC: 33.6 % — LOW (ref 39–50)
HGB BLD-MCNC: 11 G/DL — LOW (ref 13–17)
INR BLD: 6.7 RATIO — CRITICAL HIGH (ref 0.88–1.16)
LYMPHOCYTES # BLD AUTO: 1.1 K/UL — SIGNIFICANT CHANGE UP (ref 1–3.3)
LYMPHOCYTES # BLD AUTO: 16.5 % — SIGNIFICANT CHANGE UP (ref 13–44)
MCHC RBC-ENTMCNC: 30.3 PG — SIGNIFICANT CHANGE UP (ref 27–34)
MCHC RBC-ENTMCNC: 32.8 GM/DL — SIGNIFICANT CHANGE UP (ref 32–36)
MCV RBC AUTO: 92.2 FL — SIGNIFICANT CHANGE UP (ref 80–100)
MONOCYTES # BLD AUTO: 0.6 K/UL — SIGNIFICANT CHANGE UP (ref 0–0.9)
MONOCYTES NFR BLD AUTO: 9.2 % — SIGNIFICANT CHANGE UP (ref 2–14)
NEUTROPHILS # BLD AUTO: 4.8 K/UL — SIGNIFICANT CHANGE UP (ref 1.8–7.4)
NEUTROPHILS NFR BLD AUTO: 72 % — SIGNIFICANT CHANGE UP (ref 43–77)
NT-PROBNP SERPL-SCNC: HIGH PG/ML (ref 0–300)
PLATELET # BLD AUTO: 208 K/UL — SIGNIFICANT CHANGE UP (ref 150–400)
POTASSIUM SERPL-MCNC: 4.1 MMOL/L — SIGNIFICANT CHANGE UP (ref 3.5–5.3)
POTASSIUM SERPL-SCNC: 4.1 MMOL/L — SIGNIFICANT CHANGE UP (ref 3.5–5.3)
PROT SERPL-MCNC: 6.7 G/DL — SIGNIFICANT CHANGE UP (ref 6–8.3)
PROTHROM AB SERPL-ACNC: 81 SEC — HIGH (ref 10–12.9)
RBC # BLD: 3.64 M/UL — LOW (ref 4.2–5.8)
RBC # FLD: 19.8 % — HIGH (ref 10.3–14.5)
SODIUM SERPL-SCNC: 141 MMOL/L — SIGNIFICANT CHANGE UP (ref 135–145)
TROPONIN T, HIGH SENSITIVITY RESULT: 48 NG/L — SIGNIFICANT CHANGE UP (ref 0–51)
TROPONIN T, HIGH SENSITIVITY RESULT: 49 NG/L — SIGNIFICANT CHANGE UP (ref 0–51)
WBC # BLD: 6.7 K/UL — SIGNIFICANT CHANGE UP (ref 3.8–10.5)
WBC # FLD AUTO: 6.7 K/UL — SIGNIFICANT CHANGE UP (ref 3.8–10.5)

## 2019-01-15 PROCEDURE — 99285 EMERGENCY DEPT VISIT HI MDM: CPT | Mod: 25

## 2019-01-15 PROCEDURE — 71045 X-RAY EXAM CHEST 1 VIEW: CPT | Mod: 26

## 2019-01-15 PROCEDURE — 93010 ELECTROCARDIOGRAM REPORT: CPT

## 2019-01-15 PROCEDURE — 99223 1ST HOSP IP/OBS HIGH 75: CPT

## 2019-01-15 RX ORDER — ATORVASTATIN CALCIUM 80 MG/1
1 TABLET, FILM COATED ORAL
Qty: 0 | Refills: 0 | COMMUNITY

## 2019-01-15 RX ORDER — METFORMIN HYDROCHLORIDE 850 MG/1
3 TABLET ORAL
Qty: 0 | Refills: 0 | COMMUNITY

## 2019-01-15 RX ORDER — FINASTERIDE 5 MG/1
1 TABLET, FILM COATED ORAL
Qty: 0 | Refills: 0 | COMMUNITY

## 2019-01-15 RX ORDER — PIOGLITAZONE HYDROCHLORIDE 15 MG/1
1 TABLET ORAL
Qty: 0 | Refills: 0 | COMMUNITY

## 2019-01-15 RX ORDER — FLUTICASONE PROPIONATE 220 MCG
50 AEROSOL WITH ADAPTER (GRAM) INHALATION
Qty: 0 | Refills: 0 | COMMUNITY

## 2019-01-15 RX ORDER — FUROSEMIDE 40 MG
0 TABLET ORAL
Qty: 0 | Refills: 0 | COMMUNITY

## 2019-01-15 RX ORDER — LISINOPRIL 2.5 MG/1
1 TABLET ORAL
Qty: 0 | Refills: 0 | COMMUNITY

## 2019-01-15 RX ORDER — FUROSEMIDE 40 MG
1 TABLET ORAL
Qty: 0 | Refills: 0 | COMMUNITY

## 2019-01-15 RX ORDER — AMLODIPINE BESYLATE 2.5 MG/1
1 TABLET ORAL
Qty: 0 | Refills: 0 | COMMUNITY

## 2019-01-15 RX ORDER — WARFARIN SODIUM 2.5 MG/1
1 TABLET ORAL
Qty: 0 | Refills: 0 | COMMUNITY

## 2019-01-15 RX ORDER — REPAGLINIDE 1 MG/1
1 TABLET ORAL
Qty: 0 | Refills: 0 | COMMUNITY

## 2019-01-15 RX ORDER — FUROSEMIDE 40 MG
60 TABLET ORAL ONCE
Qty: 0 | Refills: 0 | Status: COMPLETED | OUTPATIENT
Start: 2019-01-15 | End: 2019-01-15

## 2019-01-15 RX ORDER — METFORMIN HYDROCHLORIDE 850 MG/1
2 TABLET ORAL
Qty: 0 | Refills: 0 | COMMUNITY

## 2019-01-15 RX ADMIN — Medication 60 MILLIGRAM(S): at 21:19

## 2019-01-15 NOTE — H&P ADULT - PROBLEM SELECTOR PLAN 6
See above.   Likely component of patient's anasarca>>will obtain scrotal US.  Will provide Mycostatin powder for now for suspected tinea cruris.

## 2019-01-15 NOTE — ED ADULT NURSE NOTE - OBJECTIVE STATEMENT
77 y/o male A&Ox3 presents to ED via EMS for increasing SOB x 3months. Pt states SOB worsened and was told by PMD to come to ED to be admitted. Upon movement pt appears to have labored respirations, b/l lung sounds are diminished. Hx of Afib, HTN, DM, and hydrocele. Pt was told by PMD that he "will need artificial valve replacement". EKG done, pt placed on cardiac monitor, will continue to monitor. Pt states nose bleeds everyday x1 month and was told my PMD to stop taking warfarin. Pt prescribed 2 lasix qday but states that he was only taking one. B/L upper extremities +1 edema, +ROM and +hand . B/L lower extremities +2 edema, +dorsiflexion, +extension, but pt states trouble ambulating and uses walker at home. Pt skin warm and dry, sacral stage I blanchable pressure ulcer, b/l lower extremities are eccymotic and left extremity has small open wound without drainage. Safety measures maintained with side rails up and bed in low position. 79 y/o male A&Ox3 presents to ED via EMS for increasing SOB x 3months. Pt states SOB worsened and was told by PMD to come to ED to be admitted. Upon movement pt appears to ha, b/l lung sounds are diminished. Hx of Afib, HTN, DM, and hydrocele. Pt was told by PMD that he "will need artificial valve replacement". EKG done, pt placed on cardiac monitor, bradycardic with HR 52, will continue to monitor. Pt states nose bleeds everyday x1 month and was told my PMD to stop taking warfarin. Pt prescribed 2 lasix qday but states that he was only taking one. B/L upper extremities +1 edema, +ROM and +hand . B/L lower extremities +2 edema, +dorsiflexion and +extension of b/l feet, but pt states trouble ambulating and uses walker at home. Pt skin warm and dry, sacral stage I blanchable pressure ulcer, b/l lower extremities are eccymotic and left extremity has small open wound without drainage. Safety measures maintained with side rails up and bed in low position. 79 y/o male A&Ox3 presents to ED via EMS for increasing SOB x 3months. Pt states SOB worsened and was told by PMD to come to ED. Pt is having dyspnea on exertion, b/l lung sounds are diminished, cap refill <2 seconds and pt denies cough and CP. Hx of Afib on warfarin, HTN, DM, and hydrocele. B/L upper extremities +1 edema, +ROM and +hand . B/L lower extremities +2 edema, +dorsiflexion and +extension of b/l feet, but pt states trouble ambulating and uses walker at home. Pt skin warm and dry, sacral stage I blanchable pressure ulcer, b/l lower extremities are ecchymotic and left extremity has small open wound without drainage. Pt states nose bleeds everyday x1 month and was told my PMD to stop taking warfarin, pt reports none taken today. Pt placed on cardiac monitor, bradycardic with HR 52, will continue to monitor. Safety measures maintained with side rails up and bed in low position.

## 2019-01-15 NOTE — H&P ADULT - PROBLEM SELECTOR PROBLEM 4
Type 2 diabetes mellitus with other diabetic kidney complication, without long-term current use of insulin

## 2019-01-15 NOTE — H&P ADULT - ATTENDING COMMENTS
NIGHT HOSPITALIST;   Patient aware of course and agrees with plan/care as above.   Prognosis is guarded.  Emotional support provided to patient.  Care reviewed with covering PA.  Care assumed by the DAY HOSPITALIST.    Madi Meade MD  [phone]

## 2019-01-15 NOTE — H&P ADULT - HISTORY OF PRESENT ILLNESS
NIGHT HOSPITALIST:   Patient UNKNOWN to me previously, assigned to me at this point via the ER to admit this 77 y/o M--patient with a history of CAD with past PCI, chronic atrial fibrillation on Coumadin (patient was told by his office physician above to stop his Coumadin and go to the ER), essential HTN, unspecified cardiac valve disorder, reported unspecified hemoccult positive stools in the office, type 2 DM on metformin, with the patient self referring to [place] directed by his office physician following an elevated INR and episode of epistaxis this AM following apparently 3 months of progressive dyspnoea and B/L LE oedema with poor aerobic functional status with dyspnoea with changing his clothes or ambulation to the BR.   Patient denies chest pain/pressure.  NO dyspnoea at present.  NO HA< no focal weakness.   NO abdominal pain, no red blood per rectum or melena.  NO back pain, no tearing back pain.   NO dysuria, no haematuria.   Denies weight loss or anorexia.  No rash.   Notes 3-4 months of persistent scrotal oedema.  No penile pain, no scrotal pain.  No leg pain.  Remaining review of systems not contributory.

## 2019-01-15 NOTE — ED PROVIDER NOTE - MEDICAL DECISION MAKING DETAILS
Trevin MARTINEZ: Patient is a 79 yo M with hx of DM, HTN, high cholesterol, afib on coumadin here for worsening dyspnea on exertion over the past 3 months with increased lower extremity swelling. Patient states it is hard for him to take care of himself. He states he is supposed to have a valve replacement but can't recall what valve. Exam: chronically ill, pale, murmur, RRR, diffuse rhonchi in lower lungs, b/l LE pitting edema. a/p: chronically ill male here for worsening symptoms and inability to care for himself. Will check ekg, labs, CXR. Pt to be admitted.

## 2019-01-15 NOTE — ED PROVIDER NOTE - PROGRESS NOTE DETAILS
Spoke with hospitalist, requesting lasix 60mg IV and renal consult. Renal fellow paged. - Makenzie Cook PA-C

## 2019-01-15 NOTE — ED PROVIDER NOTE - OBJECTIVE STATEMENT
79yo M with PMH CAD s/p stents, A.fib (on coumadin until today), HTN, HLD, DM, BIBEMS from home presenting with worsening PEREZ x 3 months. Pt reports associated LE edema, worse than 79yo M with PMH CAD s/p stents, A.fib (on coumadin until today), HTN, HLD, DM, BIBEMS from home presenting with worsening PEREZ x 3 months. Pt also reports associated LE edema, worse than baseline. Does not ambulate much at home. Pt reports he has not seen a doctor in several months. 79yo M with PMH CAD s/p stents, A.fib (on coumadin until today), HTN, HLD, DM, BIBEMS from home presenting with worsening PEREZ x 3 months. Pt also reports associated LE edema, worse than baseline. Lives alone and reports he does not ambulate much at home. Pt reports he has not seen a doctor in several months. Called PMD today and instructed to go to ED for admission. Of note, reports recent episodes of epistaxis, last this AM, and instructed to stop coumadin today. Also reports that the last time he saw cardiologist he was told he needs a valve replacement. Denies any fever/chills, cough, CP, dizziness, abd pain, n/v/d.    PMD Jj Okeefe 79yo M with PMH CAD s/p stents, A.fib (on coumadin until today), HTN, HLD, DM, BIBEMS from home presenting with worsening PEREZ x 3 months. Pt also reports associated LE edema, worse than baseline. Lives alone and reports he does not ambulate much at home. Pt reports he has not seen a doctor in several months. Called PMD today and instructed to go to ED for admission. Of note, reports recent episodes of epistaxis, last this AM, and instructed to stop coumadin today. Also reports that the last time he saw cardiologist he was told he needs a valve replacement. Patient makes urine. Denies any fever/chills, cough, CP, dizziness, abd pain, n/v/d.    PMD Jj Okeefe

## 2019-01-15 NOTE — H&P ADULT - REASON FOR ADMISSION
3 months of progressive B/L LE oedema and PEREZ 3 months of progressive B/L LE oedema and dyspnoea on exertion.

## 2019-01-15 NOTE — ED PROVIDER NOTE - CARE PLAN
Principal Discharge DX:	CHF (congestive heart failure)  Secondary Diagnosis:	ARF (acute renal failure)

## 2019-01-15 NOTE — H&P ADULT - PROBLEM SELECTOR PLAN 3
Will HOLD ACE and metformin and will follow Cr on Lasix as above.   Urine protein/Cr and microalbumin.  Renal US>

## 2019-01-15 NOTE — CONSULT NOTE ADULT - SUBJECTIVE AND OBJECTIVE BOX
[facility] Division of Kidney Diseases & Hypertension  INITIAL CONSULT NOTE  [phone]--------------------------------------------------------------------------------  HPI: 78 year old male with history of AFib on Coumadin, CAD s/p stents, valvular disorder?, HTN, HLD, DM, chronic anemia on B12 injections, who presents to the ED with PEREZ and SOB that has progressively gotten worse over the last 3 months. Patient states that he has seen cardiology in the past and has been told he has valve issues but no heart failure however in the last 3 months he has had these progressive symptoms. Patient became home bound and functional status declined and started to have a home visiting nurse come in to do check-ups for PCP Dr. Jalloh and today was sent to the ED for further management.    When patient seen he was visibly short of breath, with large edema of the legs. Patient could only say 3-4 words at a time before becoming short of breath. Patient also says he has never been told he has kidney issues and as per records his creatinine was normal as late as Nov 18. Today creatinine 3.74. He states he has not had any decrease in PO intake, or N/V but states he has had 3-4 diarrhea like bowel movements for the last 1 month. He also states his urine output may have dropped a little in the last few weeks. Also on labs bicarbonate 14 with an anion gap of 18 and BUN of 68. INR noted to be 6.7 as well.    Nephrology consulted for ELADIA, acidosis.      PAST HISTORY  --------------------------------------------------------------------------------  PAST MEDICAL & SURGICAL HISTORY:  CAD (coronary artery disease)  Prostatitis syndrome  UTI (urinary tract infection)  Diabetes  Stented coronary artery  High cholesterol  HTN (hypertension)  Afib  S/p bare metal coronary artery stent    FAMILY HISTORY:  Family history of hypertension    PAST SOCIAL HISTORY:    ALLERGIES & MEDICATIONS  --------------------------------------------------------------------------------  Allergies    latex (Unknown)  Sulfacetamide Sodium (Unknown)    Intolerances      Standing Inpatient Medications    PRN Inpatient Medications      REVIEW OF SYSTEMS  --------------------------------------------------------------------------------  Gen: No  fevers/chills, weakness  Skin: No rashes  Head/Eyes/Ears/Mouth: No headache; Normal hearing; Normal vision w/o blurriness;   Respiratory: Shortness of breath, dyspnea as well  CV: No chest pain   GI: No abdominal pain, constipation, nausea, vomiting, +diarrhea  : No increased frequency, dysuria, hematuria, nocturia  MSK: No joint pain/swelling;   Neuro: No dizziness/lightheadedness, weakness, seizures    All other systems were reviewed and are negative, except as noted.    VITALS/PHYSICAL EXAM  --------------------------------------------------------------------------------  T(C): 36.2 (01-15-19 @ 22:11), Max: 36.6 (01-15-19 @ 17:41)  HR: 48 (01-15-19 @ 22:11) (45 - 62)  BP: 139/74 (01-15-19 @ 22:11) (132/85 - 144/74)  RR: 17 (01-15-19 @ 22:11) (17 - 20)  SpO2: 96% (01-15-19 @ 22:11) (96% - 98%)  Wt(kg): --  Height (cm): 177.8 (01-15-19 @ 17:02)  Weight (kg): 83.9 (01-15-19 @ 17:02)  BMI (kg/m2): 26.5 (01-15-19 @ 17:02)  BSA (m2): 2.02 (01-15-19 @ 17:02)      Physical Exam:  	Gen: Mild distress, appears disheveled  	HEENT: PERRL, supple neck  	Pulm: Ronchi, some rales at bases, no wheezes  	CV:  S1S2  	Abd: +BS, soft, distended, no pain on palpation   	Ext: 3+ pitting edema of lower extremities up to thighs  	Neuro: No focal deficits  	Skin: Warm, without rashes  	Vascular access: N/A    LABS/STUDIES  --------------------------------------------------------------------------------              11.0   6.7   >-----------<  208      [01-15-19 @ 18:22]              33.6     141  |  109  |  68  ----------------------------<  133      [01-15-19 @ 18:22]  4.1   |  14  |  3.74        Ca     9.2     [01-15-19 @ 18:22]    TPro  6.7  /  Alb  3.7  /  TBili  1.8  /  DBili  x   /  AST  42  /  ALT  43  /  AlkPhos  85  [01-15-19 @ 18:22]    PT/INR: PT 81.0 , INR 6.70       [01-15-19 @ 18:22]  PTT: 58.8       [01-15-19 @ 18:22]      Creatinine Trend:  SCr 3.74 [01-15 @ 18:22]

## 2019-01-15 NOTE — H&P ADULT - NSHPLABSRESULTS_GEN_ALL_CORE
WBC 6.7.  Hgb 11.0.  Platelets of 208K.  INR initially 6.7>>ordered repeat.  HS troponin 49>>48.   K+ 4.1.  Random glucose of 133.  CR 3.7.  HC3 14.  Abl 3.7.  BNP 10K>  EKG tracing with atrial fibrillation with bradycardia at 45.  Chest radiograph reviewed with RIGHT pleural effusion.

## 2019-01-15 NOTE — H&P ADULT - PROBLEM SELECTOR PLAN 1
Suspected systolic dysfunction from an unspecified valve disorder.  Echo.  IV Lasix at 40 mg IV BID for now.  Daily weights.  ON Plavix.  Would review resumption of ASA. Suspected systolic dysfunction from an unspecified valve disorder.  Echo.  IV Lasix at 40 mg IV BID for now.  Daily weights.  ON Plavix.  Would review resumption of ASA.  Doubt hemothorax but will obtain urgent noncontrast chest CTT.

## 2019-01-15 NOTE — H&P ADULT - ASSESSMENT
NIGHT HOSPITALIST:  Referral of patient to [place] by his office physician following an episode of epistaxis in this patient with atrial fibrillation on Coumadin with, at present, resolved bleeding>>will repeat INR and favor holding further anticoagulation until review by the DAY PROVIDER in the AM>>at present, no indication for acute reversal due to the potential thrombotic risk.  Suspect patient has progressive aortic stenosis.  Appreciate renal evaluation>>will HOLD the ACE and cautiously continue with a split dose of Lasix 40 mg IV BID for SYMPTOMATIC relief of patient's dyspnoea but will be cautious for overdiuresis--albeit anticipate worsening of the Cr with requirements for diuresis.      Will obtain a formal echo.  Daily weights.  Will likely need a formal cardiology opinion pending the valve assessment.  Will defer to the DAYTIME PROVIDER review of the issue of resumption of anticoagulation given atrial fibrillation.   Would clarify in the AM the issue of ASA.  May consider formal ENT and/or GI opinion in the AM.    Will HOLD oral Rx for his DM in favor of a conservative (0.1 units/kg/24H Lantus at bedtime>>9 units) with FS S/S.    Will have to HOLD patient's Coreg given bradycardia and R2 pads to bedside for now.    Will obtain a renal US and scrotal US>>patient appears to have clinical tinea cruris but at present does not have a mauro cellulitis and at present does not have Aislinn's.  Wound care RN evaluation.

## 2019-01-15 NOTE — H&P ADULT - NSHPPHYSICALEXAM_GEN_ALL_CORE
Physical exam with an elderly, chronically ill M appears older than stated age with lower body anasarca.  Afebrile.   HR  40-60  RR 16.   BP  139/74  96% on RA.   HEENT< PERRL< EOMI< mild bitemporal wasting, neck supple, NO bleeding noted in nares or mouth.  chest with decreased breath sounds at the bases, cor with bradycardia with irregular rate, 3/6 MICHELLE.   Abdomen soft nontender, normal bowel sounds, Scrotal oedema with NO ecthyma but with appearance consistent with tinea cruris.  Ext with poor skin hygiene and nail hygiene with no mauro ulcers but 3+++ B/L LE oedema.  Neurologic exam AxOx3.  Speech fluent.  Cognition intact.  UE/LE 5/5.  Cerebellar axis intact.

## 2019-01-15 NOTE — H&P ADULT - PROBLEM SELECTOR PLAN 7
See above.  Now resolved likely from the elevated INR but may consider formal ENT evaluation and GI opinion on the issue of a GI evaluation for the reported guaiac positive assay--given patient's cardiac status--will likely need to be deferred.

## 2019-01-15 NOTE — ED ADULT NURSE NOTE - NSIMPLEMENTINTERV_GEN_ALL_ED
Implemented All Fall Risk Interventions:  [place] to call system. Call bell, personal items and telephone within reach. Instruct patient to call for assistance. Room bathroom lighting operational. Non-slip footwear when patient is off stretcher. Physically safe environment: no spills, clutter or unnecessary equipment. Stretcher in lowest position, wheels locked, appropriate side rails in place. Provide visual cue, wrist band, yellow gown, etc. Monitor gait and stability. Monitor for mental status changes and reorient to person, place, and time. Review medications for side effects contributing to fall risk. Reinforce activity limits and safety measures with patient and family.

## 2019-01-15 NOTE — ED PROVIDER NOTE - SKIN, MLM
LE skin changes as above. Skin otherwise normal color for race, warm, dry and intact. No evidence of rash. LE skin changes as above. +small sacral ulcer, blanchable. Skin otherwise normal color for race, warm, dry and intact. No evidence of rash.

## 2019-01-16 DIAGNOSIS — E11.29 TYPE 2 DIABETES MELLITUS WITH OTHER DIABETIC KIDNEY COMPLICATION: ICD-10-CM

## 2019-01-16 DIAGNOSIS — I48.2 CHRONIC ATRIAL FIBRILLATION: ICD-10-CM

## 2019-01-16 DIAGNOSIS — R04.0 EPISTAXIS: ICD-10-CM

## 2019-01-16 DIAGNOSIS — N50.89 OTHER SPECIFIED DISORDERS OF THE MALE GENITAL ORGANS: ICD-10-CM

## 2019-01-16 DIAGNOSIS — I50.23 ACUTE ON CHRONIC SYSTOLIC (CONGESTIVE) HEART FAILURE: ICD-10-CM

## 2019-01-16 DIAGNOSIS — R00.1 BRADYCARDIA, UNSPECIFIED: ICD-10-CM

## 2019-01-16 LAB
ACANTHOCYTES BLD QL SMEAR: SIGNIFICANT CHANGE UP
ANION GAP SERPL CALC-SCNC: 15 MMOL/L — SIGNIFICANT CHANGE UP (ref 5–17)
ANISOCYTOSIS BLD QL: SIGNIFICANT CHANGE UP
APPEARANCE UR: ABNORMAL
APTT BLD: 57.7 SEC — HIGH (ref 27.5–36.3)
APTT BLD: 58 SEC — HIGH (ref 27.5–36.3)
BACTERIA # UR AUTO: NEGATIVE — SIGNIFICANT CHANGE UP
BASOPHILS # BLD AUTO: 0.01 K/UL — SIGNIFICANT CHANGE UP (ref 0–0.2)
BASOPHILS NFR BLD AUTO: 0.2 % — SIGNIFICANT CHANGE UP (ref 0–2)
BILIRUB UR-MCNC: NEGATIVE — SIGNIFICANT CHANGE UP
BUN SERPL-MCNC: 73 MG/DL — HIGH (ref 7–23)
BURR CELLS BLD QL SMEAR: SIGNIFICANT CHANGE UP
CALCIUM SERPL-MCNC: 8.9 MG/DL — SIGNIFICANT CHANGE UP (ref 8.4–10.5)
CHLORIDE SERPL-SCNC: 113 MMOL/L — HIGH (ref 96–108)
CO2 SERPL-SCNC: 13 MMOL/L — LOW (ref 22–31)
COLOR SPEC: YELLOW — SIGNIFICANT CHANGE UP
CREAT ?TM UR-MCNC: 28 MG/DL — SIGNIFICANT CHANGE UP
CREAT ?TM UR-MCNC: 28 MG/DL — SIGNIFICANT CHANGE UP
CREAT SERPL-MCNC: 3.66 MG/DL — HIGH (ref 0.5–1.3)
DIFF PNL FLD: ABNORMAL
EOSINOPHIL # BLD AUTO: 0.14 K/UL — SIGNIFICANT CHANGE UP (ref 0–0.5)
EOSINOPHIL NFR BLD AUTO: 2.7 % — SIGNIFICANT CHANGE UP (ref 0–6)
EPI CELLS # UR: 0 /HPF — SIGNIFICANT CHANGE UP (ref 0–5)
GLUCOSE SERPL-MCNC: 128 MG/DL — HIGH (ref 70–99)
GLUCOSE UR QL: NEGATIVE MG/DL — SIGNIFICANT CHANGE UP
HBA1C BLD-MCNC: 5.6 % — SIGNIFICANT CHANGE UP (ref 4–5.6)
HCT VFR BLD CALC: 30.5 % — LOW (ref 39–50)
HGB BLD-MCNC: 10 G/DL — LOW (ref 13–17)
HYALINE CASTS # UR AUTO: 0 /LPF — SIGNIFICANT CHANGE UP (ref 0–7)
IMM GRANULOCYTES NFR BLD AUTO: 0.4 % — SIGNIFICANT CHANGE UP (ref 0–1.5)
INR BLD: 7.61 RATIO — CRITICAL HIGH (ref 0.88–1.16)
INR BLD: 7.64 RATIO — CRITICAL HIGH (ref 0.88–1.16)
KETONES UR-MCNC: NEGATIVE — SIGNIFICANT CHANGE UP
LEUKOCYTE ESTERASE UR-ACNC: ABNORMAL
LYMPHOCYTES # BLD AUTO: 0.84 K/UL — LOW (ref 1–3.3)
LYMPHOCYTES # BLD AUTO: 16.3 % — SIGNIFICANT CHANGE UP (ref 13–44)
MACROCYTES BLD QL: SIGNIFICANT CHANGE UP
MANUAL SMEAR VERIFICATION: SIGNIFICANT CHANGE UP
MCHC RBC-ENTMCNC: 29.1 PG — SIGNIFICANT CHANGE UP (ref 27–34)
MCHC RBC-ENTMCNC: 32.8 GM/DL — SIGNIFICANT CHANGE UP (ref 32–36)
MCV RBC AUTO: 88.7 FL — SIGNIFICANT CHANGE UP (ref 80–100)
MICROALBUMIN UR-MCNC: 7.1 MG/DL — SIGNIFICANT CHANGE UP
MICROALBUMIN/CREAT UR-RTO: 254 MG/G — HIGH (ref 0–30)
MONOCYTES # BLD AUTO: 0.56 K/UL — SIGNIFICANT CHANGE UP (ref 0–0.9)
MONOCYTES NFR BLD AUTO: 10.9 % — SIGNIFICANT CHANGE UP (ref 2–14)
NEUTROPHILS # BLD AUTO: 3.59 K/UL — SIGNIFICANT CHANGE UP (ref 1.8–7.4)
NEUTROPHILS NFR BLD AUTO: 69.5 % — SIGNIFICANT CHANGE UP (ref 43–77)
NITRITE UR-MCNC: NEGATIVE — SIGNIFICANT CHANGE UP
NT-PROBNP SERPL-SCNC: HIGH PG/ML (ref 0–300)
OSMOLALITY UR: 290 MOS/KG — SIGNIFICANT CHANGE UP (ref 50–1200)
PH UR: 5.5 — SIGNIFICANT CHANGE UP (ref 5–8)
PLAT MORPH BLD: NORMAL — SIGNIFICANT CHANGE UP
PLATELET # BLD AUTO: 200 K/UL — SIGNIFICANT CHANGE UP (ref 150–400)
POIKILOCYTOSIS BLD QL AUTO: SIGNIFICANT CHANGE UP
POTASSIUM SERPL-MCNC: 4.1 MMOL/L — SIGNIFICANT CHANGE UP (ref 3.5–5.3)
POTASSIUM SERPL-SCNC: 4.1 MMOL/L — SIGNIFICANT CHANGE UP (ref 3.5–5.3)
PROT ?TM UR-MCNC: 17 MG/DL — HIGH (ref 0–12)
PROT UR-MCNC: NEGATIVE MG/DL — SIGNIFICANT CHANGE UP
PROT/CREAT UR-RTO: 0.6 RATIO — HIGH (ref 0–0.2)
PROTHROM AB SERPL-ACNC: 92.4 SEC — HIGH (ref 10–12.9)
PROTHROM AB SERPL-ACNC: 92.8 SEC — HIGH (ref 10–13.1)
RBC # BLD: 3.44 M/UL — LOW (ref 4.2–5.8)
RBC # FLD: 22.2 % — HIGH (ref 10.3–14.5)
RBC BLD AUTO: ABNORMAL
RBC CASTS # UR COMP ASSIST: 10 /HPF — HIGH (ref 0–4)
SODIUM SERPL-SCNC: 141 MMOL/L — SIGNIFICANT CHANGE UP (ref 135–145)
SODIUM UR-SCNC: 86 MMOL/L — SIGNIFICANT CHANGE UP
SP GR SPEC: 1.01 — SIGNIFICANT CHANGE UP (ref 1.01–1.02)
UROBILINOGEN FLD QL: NEGATIVE MG/DL — SIGNIFICANT CHANGE UP
WBC # BLD: 5.16 K/UL — SIGNIFICANT CHANGE UP (ref 3.8–10.5)
WBC # FLD AUTO: 5.16 K/UL — SIGNIFICANT CHANGE UP (ref 3.8–10.5)
WBC UR QL: 275 /HPF — HIGH (ref 0–5)

## 2019-01-16 PROCEDURE — 93306 TTE W/DOPPLER COMPLETE: CPT | Mod: 26

## 2019-01-16 PROCEDURE — 99223 1ST HOSP IP/OBS HIGH 75: CPT | Mod: GC

## 2019-01-16 PROCEDURE — 76770 US EXAM ABDO BACK WALL COMP: CPT | Mod: 26,59

## 2019-01-16 PROCEDURE — 76870 US EXAM SCROTUM: CPT | Mod: 26

## 2019-01-16 PROCEDURE — 71250 CT THORAX DX C-: CPT | Mod: 26

## 2019-01-16 PROCEDURE — 93976 VASCULAR STUDY: CPT | Mod: 26

## 2019-01-16 PROCEDURE — 99233 SBSQ HOSP IP/OBS HIGH 50: CPT

## 2019-01-16 RX ORDER — FUROSEMIDE 40 MG
40 TABLET ORAL
Qty: 0 | Refills: 0 | Status: DISCONTINUED | OUTPATIENT
Start: 2019-01-16 | End: 2019-01-18

## 2019-01-16 RX ORDER — ATORVASTATIN CALCIUM 80 MG/1
40 TABLET, FILM COATED ORAL ONCE
Qty: 0 | Refills: 0 | Status: COMPLETED | OUTPATIENT
Start: 2019-01-16 | End: 2019-01-16

## 2019-01-16 RX ORDER — FINASTERIDE 5 MG/1
5 TABLET, FILM COATED ORAL DAILY
Qty: 0 | Refills: 0 | Status: DISCONTINUED | OUTPATIENT
Start: 2019-01-16 | End: 2019-01-29

## 2019-01-16 RX ORDER — ASPIRIN/CALCIUM CARB/MAGNESIUM 324 MG
81 TABLET ORAL DAILY
Qty: 0 | Refills: 0 | Status: DISCONTINUED | OUTPATIENT
Start: 2019-01-16 | End: 2019-01-20

## 2019-01-16 RX ORDER — DEXTROSE 50 % IN WATER 50 %
12.5 SYRINGE (ML) INTRAVENOUS ONCE
Qty: 0 | Refills: 0 | Status: DISCONTINUED | OUTPATIENT
Start: 2019-01-16 | End: 2019-01-29

## 2019-01-16 RX ORDER — PHYTONADIONE (VIT K1) 5 MG
2.5 TABLET ORAL ONCE
Qty: 0 | Refills: 0 | Status: COMPLETED | OUTPATIENT
Start: 2019-01-16 | End: 2019-01-16

## 2019-01-16 RX ORDER — ATORVASTATIN CALCIUM 80 MG/1
40 TABLET, FILM COATED ORAL AT BEDTIME
Qty: 0 | Refills: 0 | Status: DISCONTINUED | OUTPATIENT
Start: 2019-01-16 | End: 2019-01-29

## 2019-01-16 RX ORDER — DEXTROSE 50 % IN WATER 50 %
15 SYRINGE (ML) INTRAVENOUS ONCE
Qty: 0 | Refills: 0 | Status: DISCONTINUED | OUTPATIENT
Start: 2019-01-16 | End: 2019-01-29

## 2019-01-16 RX ORDER — AMLODIPINE BESYLATE 2.5 MG/1
5 TABLET ORAL DAILY
Qty: 0 | Refills: 0 | Status: DISCONTINUED | OUTPATIENT
Start: 2019-01-16 | End: 2019-01-26

## 2019-01-16 RX ORDER — GLUCAGON INJECTION, SOLUTION 0.5 MG/.1ML
1 INJECTION, SOLUTION SUBCUTANEOUS ONCE
Qty: 0 | Refills: 0 | Status: DISCONTINUED | OUTPATIENT
Start: 2019-01-16 | End: 2019-01-29

## 2019-01-16 RX ORDER — DEXTROSE 50 % IN WATER 50 %
25 SYRINGE (ML) INTRAVENOUS ONCE
Qty: 0 | Refills: 0 | Status: DISCONTINUED | OUTPATIENT
Start: 2019-01-16 | End: 2019-01-29

## 2019-01-16 RX ORDER — INSULIN LISPRO 100/ML
VIAL (ML) SUBCUTANEOUS
Qty: 0 | Refills: 0 | Status: DISCONTINUED | OUTPATIENT
Start: 2019-01-16 | End: 2019-01-27

## 2019-01-16 RX ORDER — NYSTATIN CREAM 100000 [USP'U]/G
1 CREAM TOPICAL
Qty: 0 | Refills: 0 | Status: DISCONTINUED | OUTPATIENT
Start: 2019-01-16 | End: 2019-01-29

## 2019-01-16 RX ORDER — INSULIN GLARGINE 100 [IU]/ML
9 INJECTION, SOLUTION SUBCUTANEOUS AT BEDTIME
Qty: 0 | Refills: 0 | Status: DISCONTINUED | OUTPATIENT
Start: 2019-01-16 | End: 2019-01-29

## 2019-01-16 RX ORDER — CLOPIDOGREL BISULFATE 75 MG/1
75 TABLET, FILM COATED ORAL DAILY
Qty: 0 | Refills: 0 | Status: DISCONTINUED | OUTPATIENT
Start: 2019-01-16 | End: 2019-01-29

## 2019-01-16 RX ORDER — SODIUM CHLORIDE 9 MG/ML
1000 INJECTION, SOLUTION INTRAVENOUS
Qty: 0 | Refills: 0 | Status: DISCONTINUED | OUTPATIENT
Start: 2019-01-16 | End: 2019-01-29

## 2019-01-16 RX ORDER — INSULIN LISPRO 100/ML
VIAL (ML) SUBCUTANEOUS AT BEDTIME
Qty: 0 | Refills: 0 | Status: DISCONTINUED | OUTPATIENT
Start: 2019-01-16 | End: 2019-01-27

## 2019-01-16 RX ORDER — CARVEDILOL PHOSPHATE 80 MG/1
1 CAPSULE, EXTENDED RELEASE ORAL
Qty: 0 | Refills: 0 | COMMUNITY

## 2019-01-16 RX ADMIN — CLOPIDOGREL BISULFATE 75 MILLIGRAM(S): 75 TABLET, FILM COATED ORAL at 11:13

## 2019-01-16 RX ADMIN — ATORVASTATIN CALCIUM 40 MILLIGRAM(S): 80 TABLET, FILM COATED ORAL at 21:51

## 2019-01-16 RX ADMIN — FINASTERIDE 5 MILLIGRAM(S): 5 TABLET, FILM COATED ORAL at 11:13

## 2019-01-16 RX ADMIN — NYSTATIN CREAM 1 APPLICATION(S): 100000 CREAM TOPICAL at 05:14

## 2019-01-16 RX ADMIN — Medication 40 MILLIGRAM(S): at 17:40

## 2019-01-16 RX ADMIN — NYSTATIN CREAM 1 APPLICATION(S): 100000 CREAM TOPICAL at 17:41

## 2019-01-16 RX ADMIN — Medication 81 MILLIGRAM(S): at 17:40

## 2019-01-16 RX ADMIN — Medication 1: at 12:27

## 2019-01-16 RX ADMIN — Medication 1: at 17:40

## 2019-01-16 RX ADMIN — INSULIN GLARGINE 9 UNIT(S): 100 INJECTION, SOLUTION SUBCUTANEOUS at 21:50

## 2019-01-16 RX ADMIN — AMLODIPINE BESYLATE 5 MILLIGRAM(S): 2.5 TABLET ORAL at 05:15

## 2019-01-16 RX ADMIN — Medication 2.5 MILLIGRAM(S): at 11:13

## 2019-01-16 RX ADMIN — Medication 40 MILLIGRAM(S): at 05:15

## 2019-01-16 RX ADMIN — ATORVASTATIN CALCIUM 40 MILLIGRAM(S): 80 TABLET, FILM COATED ORAL at 00:26

## 2019-01-16 NOTE — CHART NOTE - NSCHARTNOTEFT_GEN_A_CORE
Called by RN for PT with critical value INR 7.64   Pt seen and examined reports no epistaxis, gum bleeding or any overt bleeding, educated Pt on s/s to report to nursing, vSS at present,   Vit k 2.5 oral x1 ordered   Repeat am labs   C/w vs   Will continue monitor   Medicine Team   DAVID SILVEIRA-c [id number]

## 2019-01-16 NOTE — DIETITIAN INITIAL EVALUATION ADULT. - NS AS NUTRI INTERV MEALS SNACK
Continue current diet, continue to monitor renal indices/ if able to liberalize diet and remove potassium/phosphorus restriction

## 2019-01-16 NOTE — DIETITIAN INITIAL EVALUATION ADULT. - ORAL INTAKE PTA
fair/Pt reports good appetite, fair PO intake for a couple months PTA 2/2 not being able to walk and get out much. Pt would have a lot of takeout and frozen TV dinners. Pt reports taking B12, Vitamin C and vitamin A. NKFA.

## 2019-01-16 NOTE — PROGRESS NOTE ADULT - ATTENDING COMMENTS
D/w patient at length bedside.   Patient has not left home for last 3 months due to weakness and SOB. Has not been compliant with diuretics due to fact he needs to urinate freq and has been eating chinese and italian take out daily. Has not gone to follow up with his PMD and INR check has not been done in many month.   He was also told he would need AS surg in the past but could not do due to GIB and will need colonoscopy first. Patient is not optimized for any GI or valve surg at this time.

## 2019-01-16 NOTE — DIETITIAN INITIAL EVALUATION ADULT. - OTHER INFO
Pt seen for nutrition consult. Per chart, pt presented with epistasix, edema, supratherapeutic INR. Pt seen at bedside, reports eating well this admission, consuming >75% of meals. Pt denies any history of chewing/swallowing difficulty or GI distress at this time. Pt reports being taught DM, heart healthy nutrition education through the years with written material at home. Pt reports he is aware of Pt seen for nutrition consult. Per chart, pt presented with epistasix, edema, supratherapeutic INR. Coumadin currently on hold. Pt seen at bedside, reports eating well this admission, consuming >75% of meals. Pt denies any history of chewing/swallowing difficulty or GI distress at this time. Pt reports being taught DM, heart healthy nutrition education through the years with written material at home. Pt reports he is aware of weight gain parameters of when to contact MD. Encouraged daily weights. Briefly reviewed current diet order- consistent CHO, DASH, 1200ml fluid restriction, no concentrated potassium and no concentrated phosphorous- pt verbalized understanding and declined need for education.  RD to follow up with nutrition education when pt more receptive.

## 2019-01-16 NOTE — DIETITIAN INITIAL EVALUATION ADULT. - ADHERENCE
Pt reports he tries to follow a healthy low sodium, low fat and low sugar diet. Pt with DM , managed with Metformin, pt checks SMBG 1x daily. Noted to have HgbA1c 5.6% (1/16) indicating good glycemic control Pt reports he tries to follow a healthy diet (with the exception of the past couple of months)- low sodium, low fat and low sugar diet. Pt with DM , managed with Metformin, pt checks SMBG 1x daily. Noted to have HgbA1c 5.6% (1/16) indicating good glycemic control

## 2019-01-16 NOTE — PROGRESS NOTE ADULT - PROBLEM SELECTOR PLAN 1
Suspected systolic dysfunction from an unspecified valve disorder.    IV Lasix at 40 mg IV BID for now.    Daily weights strict I+Os. check echo  resume back on ASA given no evidence of hemothroax on CT. cont Plavix.

## 2019-01-16 NOTE — PROGRESS NOTE ADULT - SUBJECTIVE AND OBJECTIVE BOX
Patient is a 78y old  Male who presents with a chief complaint of 3 months of progressive B/L LE oedema and dyspnoea on exertion. (15 Lazaro 2019 23:54)        SUBJECTIVE / OVERNIGHT EVENTS:  some epistaxis this am. seems resolved. feels better.  swelling feels better. afebrile. denies any bowel or urinary complaints      MEDICATIONS  (STANDING):  amLODIPine   Tablet 5 milliGRAM(s) Oral daily  atorvastatin 40 milliGRAM(s) Oral at bedtime  clopidogrel Tablet 75 milliGRAM(s) Oral daily  dextrose 5%. 1000 milliLiter(s) (50 mL/Hr) IV Continuous <Continuous>  dextrose 50% Injectable 12.5 Gram(s) IV Push once  dextrose 50% Injectable 25 Gram(s) IV Push once  dextrose 50% Injectable 25 Gram(s) IV Push once  finasteride 5 milliGRAM(s) Oral daily  furosemide   Injectable 40 milliGRAM(s) IV Push two times a day  insulin glargine Injectable (LANTUS) 9 Unit(s) SubCutaneous at bedtime  insulin lispro (HumaLOG) corrective regimen sliding scale   SubCutaneous three times a day before meals  insulin lispro (HumaLOG) corrective regimen sliding scale   SubCutaneous at bedtime  nystatin Powder 1 Application(s) Topical two times a day    MEDICATIONS  (PRN):  dextrose 40% Gel 15 Gram(s) Oral once PRN Blood Glucose LESS THAN 70 milliGRAM(s)/deciliter  glucagon  Injectable 1 milliGRAM(s) IntraMuscular once PRN Glucose LESS THAN 70 milligrams/deciliter      Vital Signs Last 24 Hrs  T(C): 36.3 (2019 12:08), Max: 36.6 (15 Lazaro 2019 17:41)  T(F): 97.3 (2019 12:08), Max: 97.8 (15 Lazaro 2019 17:41)  HR: 42 (2019 14:07) (38 - 62)  BP: 128/69 (2019 14:07) (110/67 - 144/74)  BP(mean): --  RR: 18 (2019 14:07) (17 - 20)  SpO2: 96% (2019 14:07) (96% - 98%)  CAPILLARY BLOOD GLUCOSE      POCT Blood Glucose.: 191 mg/dL (2019 12:08)  POCT Blood Glucose.: 114 mg/dL (2019 08:19)    I&O's Summary    15 Lazaro 2019 07:01  -  2019 07:00  --------------------------------------------------------  IN: 240 mL / OUT: 415 mL / NET: -175 mL    2019 07:01  -  2019 14:19  --------------------------------------------------------  IN: 480 mL / OUT: 690 mL / NET: -210 mL          PHYSICAL EXAM  GENERAL: NAD, well-developed  HEAD:  Atraumatic, Normocephalic  EYES: EOMI, conjunctiva and sclera clear  NECK: Supple, No JVD  CHEST/LUNG: Clear to auscultation bilaterally; No wheeze  HEART: Regular rate and rhythm; No murmurs, rubs, or gallops  ABDOMEN: Soft, Nontender, Nondistended; Bowel sounds present, scrotal edema  EXTREMITIES: bilateral LE edema pitting 2 +. venous stasis changes  PSYCH: AAOx3  SKIN: No rashes or lesions    LABS:                        10.0   5.16  )-----------( 200      ( 2019 08:24 )             30.5     01-16    141  |  113<H>  |  73<H>  ----------------------------<  128<H>  4.1   |  13<L>  |  3.66<H>    Ca    8.9      2019 05:49  Phos  5.0     -  Mg     1.9         TPro  6.7  /  Alb  3.7  /  TBili  1.8<H>  /  DBili  x   /  AST  42<H>  /  ALT  43  /  AlkPhos  85  -15    PT/INR - ( 2019 08:15 )   PT: 92.8 sec;   INR: 7.64 ratio         PTT - ( 2019 08:15 )  PTT:57.7 sec      Urinalysis Basic - ( 2019 08:36 )    Color: Yellow / Appearance: Slightly Turbid / S.011 / pH: x  Gluc: x / Ketone: Negative  / Bili: Negative / Urobili: Negative mg/dL   Blood: x / Protein: Negative mg/dL / Nitrite: Negative   Leuk Esterase: Large / RBC: 10 /HPF /  /HPF   Sq Epi: x / Non Sq Epi: 0 /HPF / Bacteria: Negative          RADIOLOGY & ADDITIONAL TESTS:    Imaging Personally Reviewed:  Consultant(s) Notes Reviewed:    Care Discussed with Consultants/Other Providers:

## 2019-01-16 NOTE — DIETITIAN INITIAL EVALUATION ADULT. - ENERGY NEEDS
Ht:70 Wt: 185pounds BMI: 26.5kg/m2 IBW:  166 pounds(+/-10%)   +2 bilateral arm edema, +3 bilateral leg, ankle, foot edema. No pressure ulcers documented.

## 2019-01-16 NOTE — DIETITIAN INITIAL EVALUATION ADULT. - DIET TYPE
no concentrated potassium/1200ml/consistent carbohydrate (no snacks)/DASH/TLC (sodium and cholesterol restricted diet)/no concentrated phosphorus

## 2019-01-16 NOTE — DIETITIAN INITIAL EVALUATION ADULT. - PROBLEM SELECTOR PLAN 1
Suspected systolic dysfunction from an unspecified valve disorder.  Echo.  IV Lasix at 40 mg IV BID for now.  Daily weights.  ON Plavix.  Would review resumption of ASA.  Doubt hemothorax but will obtain urgent noncontrast chest CTT.

## 2019-01-17 LAB
ANION GAP SERPL CALC-SCNC: 14 MMOL/L — SIGNIFICANT CHANGE UP (ref 5–17)
ANION GAP SERPL CALC-SCNC: 17 MMOL/L — SIGNIFICANT CHANGE UP (ref 5–17)
BASOPHILS # BLD AUTO: 0.01 K/UL — SIGNIFICANT CHANGE UP (ref 0–0.2)
BASOPHILS NFR BLD AUTO: 0.2 % — SIGNIFICANT CHANGE UP (ref 0–2)
BUN SERPL-MCNC: 72 MG/DL — HIGH (ref 7–23)
BUN SERPL-MCNC: 73 MG/DL — HIGH (ref 7–23)
CALCIUM SERPL-MCNC: 8.9 MG/DL — SIGNIFICANT CHANGE UP (ref 8.4–10.5)
CALCIUM SERPL-MCNC: 9.3 MG/DL — SIGNIFICANT CHANGE UP (ref 8.4–10.5)
CHLORIDE SERPL-SCNC: 110 MMOL/L — HIGH (ref 96–108)
CHLORIDE SERPL-SCNC: 115 MMOL/L — HIGH (ref 96–108)
CK MB BLD-MCNC: 6.7 % — HIGH (ref 0–3.5)
CK MB CFR SERPL CALC: 3.2 NG/ML — SIGNIFICANT CHANGE UP (ref 0–6.7)
CK SERPL-CCNC: 48 U/L — SIGNIFICANT CHANGE UP (ref 30–200)
CO2 SERPL-SCNC: 14 MMOL/L — LOW (ref 22–31)
CO2 SERPL-SCNC: 15 MMOL/L — LOW (ref 22–31)
CREAT SERPL-MCNC: 3.31 MG/DL — HIGH (ref 0.5–1.3)
CREAT SERPL-MCNC: 3.54 MG/DL — HIGH (ref 0.5–1.3)
EOSINOPHIL # BLD AUTO: 0.22 K/UL — SIGNIFICANT CHANGE UP (ref 0–0.5)
EOSINOPHIL NFR BLD AUTO: 3.8 % — SIGNIFICANT CHANGE UP (ref 0–6)
GAS PNL BLDA: SIGNIFICANT CHANGE UP
GLUCOSE BLDC GLUCOMTR-MCNC: 195 MG/DL — HIGH (ref 70–99)
GLUCOSE BLDC GLUCOMTR-MCNC: 239 MG/DL — HIGH (ref 70–99)
GLUCOSE SERPL-MCNC: 127 MG/DL — HIGH (ref 70–99)
GLUCOSE SERPL-MCNC: 235 MG/DL — HIGH (ref 70–99)
HCT VFR BLD CALC: 31.7 % — LOW (ref 39–50)
HCT VFR BLD CALC: 37.3 % — LOW (ref 39–50)
HGB BLD-MCNC: 10.1 G/DL — LOW (ref 13–17)
HGB BLD-MCNC: 12.4 G/DL — LOW (ref 13–17)
IMM GRANULOCYTES NFR BLD AUTO: 0.2 % — SIGNIFICANT CHANGE UP (ref 0–1.5)
INR BLD: 3.63 RATIO — HIGH (ref 0.88–1.16)
LYMPHOCYTES # BLD AUTO: 0.84 K/UL — LOW (ref 1–3.3)
LYMPHOCYTES # BLD AUTO: 14.5 % — SIGNIFICANT CHANGE UP (ref 13–44)
MAGNESIUM SERPL-MCNC: 1.9 MG/DL — SIGNIFICANT CHANGE UP (ref 1.6–2.6)
MCHC RBC-ENTMCNC: 28.4 PG — SIGNIFICANT CHANGE UP (ref 27–34)
MCHC RBC-ENTMCNC: 30.6 PG — SIGNIFICANT CHANGE UP (ref 27–34)
MCHC RBC-ENTMCNC: 31.9 GM/DL — LOW (ref 32–36)
MCHC RBC-ENTMCNC: 33.4 GM/DL — SIGNIFICANT CHANGE UP (ref 32–36)
MCV RBC AUTO: 89 FL — SIGNIFICANT CHANGE UP (ref 80–100)
MCV RBC AUTO: 91.6 FL — SIGNIFICANT CHANGE UP (ref 80–100)
MONOCYTES # BLD AUTO: 0.6 K/UL — SIGNIFICANT CHANGE UP (ref 0–0.9)
MONOCYTES NFR BLD AUTO: 10.3 % — SIGNIFICANT CHANGE UP (ref 2–14)
NEUTROPHILS # BLD AUTO: 4.13 K/UL — SIGNIFICANT CHANGE UP (ref 1.8–7.4)
NEUTROPHILS NFR BLD AUTO: 71 % — SIGNIFICANT CHANGE UP (ref 43–77)
PHOSPHATE SERPL-MCNC: 4.2 MG/DL — SIGNIFICANT CHANGE UP (ref 2.5–4.5)
PLATELET # BLD AUTO: 226 K/UL — SIGNIFICANT CHANGE UP (ref 150–400)
PLATELET # BLD AUTO: 243 K/UL — SIGNIFICANT CHANGE UP (ref 150–400)
POTASSIUM SERPL-MCNC: 4 MMOL/L — SIGNIFICANT CHANGE UP (ref 3.5–5.3)
POTASSIUM SERPL-MCNC: 4 MMOL/L — SIGNIFICANT CHANGE UP (ref 3.5–5.3)
POTASSIUM SERPL-SCNC: 4 MMOL/L — SIGNIFICANT CHANGE UP (ref 3.5–5.3)
POTASSIUM SERPL-SCNC: 4 MMOL/L — SIGNIFICANT CHANGE UP (ref 3.5–5.3)
PROTHROM AB SERPL-ACNC: 43.1 SEC — HIGH (ref 10–13.1)
RAPID RVP RESULT: SIGNIFICANT CHANGE UP
RBC # BLD: 3.56 M/UL — LOW (ref 4.2–5.8)
RBC # BLD: 4.07 M/UL — LOW (ref 4.2–5.8)
RBC # FLD: 20 % — HIGH (ref 10.3–14.5)
RBC # FLD: 22 % — HIGH (ref 10.3–14.5)
SODIUM SERPL-SCNC: 141 MMOL/L — SIGNIFICANT CHANGE UP (ref 135–145)
SODIUM SERPL-SCNC: 144 MMOL/L — SIGNIFICANT CHANGE UP (ref 135–145)
TROPONIN T, HIGH SENSITIVITY RESULT: 38 NG/L — SIGNIFICANT CHANGE UP (ref 0–51)
WBC # BLD: 5.81 K/UL — SIGNIFICANT CHANGE UP (ref 3.8–10.5)
WBC # BLD: 9.1 K/UL — SIGNIFICANT CHANGE UP (ref 3.8–10.5)
WBC # FLD AUTO: 5.81 K/UL — SIGNIFICANT CHANGE UP (ref 3.8–10.5)
WBC # FLD AUTO: 9.1 K/UL — SIGNIFICANT CHANGE UP (ref 3.8–10.5)

## 2019-01-17 PROCEDURE — 71045 X-RAY EXAM CHEST 1 VIEW: CPT | Mod: 26

## 2019-01-17 PROCEDURE — 99232 SBSQ HOSP IP/OBS MODERATE 35: CPT | Mod: GC

## 2019-01-17 PROCEDURE — 99223 1ST HOSP IP/OBS HIGH 75: CPT | Mod: GC

## 2019-01-17 PROCEDURE — 99233 SBSQ HOSP IP/OBS HIGH 50: CPT

## 2019-01-17 PROCEDURE — 93010 ELECTROCARDIOGRAM REPORT: CPT

## 2019-01-17 RX ORDER — IPRATROPIUM/ALBUTEROL SULFATE 18-103MCG
3 AEROSOL WITH ADAPTER (GRAM) INHALATION EVERY 6 HOURS
Qty: 0 | Refills: 0 | Status: DISCONTINUED | OUTPATIENT
Start: 2019-01-17 | End: 2019-01-29

## 2019-01-17 RX ORDER — FUROSEMIDE 40 MG
20 TABLET ORAL ONCE
Qty: 0 | Refills: 0 | Status: COMPLETED | OUTPATIENT
Start: 2019-01-17 | End: 2019-01-17

## 2019-01-17 RX ORDER — IPRATROPIUM/ALBUTEROL SULFATE 18-103MCG
3 AEROSOL WITH ADAPTER (GRAM) INHALATION ONCE
Qty: 0 | Refills: 0 | Status: COMPLETED | OUTPATIENT
Start: 2019-01-17 | End: 2019-01-17

## 2019-01-17 RX ORDER — ACETAMINOPHEN 500 MG
650 TABLET ORAL ONCE
Qty: 0 | Refills: 0 | Status: COMPLETED | OUTPATIENT
Start: 2019-01-17 | End: 2019-01-17

## 2019-01-17 RX ORDER — MAGNESIUM SULFATE 500 MG/ML
1 VIAL (ML) INJECTION ONCE
Qty: 0 | Refills: 0 | Status: COMPLETED | OUTPATIENT
Start: 2019-01-17 | End: 2019-01-17

## 2019-01-17 RX ADMIN — CLOPIDOGREL BISULFATE 75 MILLIGRAM(S): 75 TABLET, FILM COATED ORAL at 11:22

## 2019-01-17 RX ADMIN — FINASTERIDE 5 MILLIGRAM(S): 5 TABLET, FILM COATED ORAL at 11:22

## 2019-01-17 RX ADMIN — Medication 3 MILLILITER(S): at 20:45

## 2019-01-17 RX ADMIN — Medication 650 MILLIGRAM(S): at 06:50

## 2019-01-17 RX ADMIN — Medication 20 MILLIGRAM(S): at 20:20

## 2019-01-17 RX ADMIN — Medication 81 MILLIGRAM(S): at 11:22

## 2019-01-17 RX ADMIN — NYSTATIN CREAM 1 APPLICATION(S): 100000 CREAM TOPICAL at 18:28

## 2019-01-17 RX ADMIN — Medication 3 MILLILITER(S): at 20:30

## 2019-01-17 RX ADMIN — Medication 1: at 16:53

## 2019-01-17 RX ADMIN — Medication 3 MILLILITER(S): at 20:19

## 2019-01-17 RX ADMIN — Medication 40 MILLIGRAM(S): at 05:03

## 2019-01-17 RX ADMIN — Medication 100 GRAM(S): at 20:19

## 2019-01-17 RX ADMIN — NYSTATIN CREAM 1 APPLICATION(S): 100000 CREAM TOPICAL at 05:03

## 2019-01-17 RX ADMIN — INSULIN GLARGINE 9 UNIT(S): 100 INJECTION, SOLUTION SUBCUTANEOUS at 22:17

## 2019-01-17 RX ADMIN — ATORVASTATIN CALCIUM 40 MILLIGRAM(S): 80 TABLET, FILM COATED ORAL at 22:17

## 2019-01-17 RX ADMIN — Medication 1: at 12:34

## 2019-01-17 RX ADMIN — AMLODIPINE BESYLATE 5 MILLIGRAM(S): 2.5 TABLET ORAL at 05:03

## 2019-01-17 RX ADMIN — Medication 40 MILLIGRAM(S): at 18:27

## 2019-01-17 RX ADMIN — Medication 650 MILLIGRAM(S): at 06:07

## 2019-01-17 NOTE — PROGRESS NOTE ADULT - PROBLEM SELECTOR PLAN 2
Likely secondary to azotemia, lactic acid normal, sugars normal as well  - Hold metformin in setting of acidosis and ELADIA  - pH compensated currently, no need for bicarb supplementation however continue monitoring BMP daily or as needed.

## 2019-01-17 NOTE — CONSULT NOTE ADULT - SUBJECTIVE AND OBJECTIVE BOX
CHIEF COMPLAINT: PEREZ, LE edema    HISTORY OF PRESENT ILLNESS:  78M with PMH of CAD s/p DAYANA to mLAD and POBA to D2 5/2017 with Dr. Harris, AFib on warfarin, T2DM, HLD, HTN who presented from home with worsening PEREZ and LE edema x3 months. Patient lives alone at home and reports that his exertional capacity has significantly decreased over the past few months to the point that he could not walk from his bedroom to bathroom. He has a visiting RN who checked on him on day of presentation who spoke with his PMD and it was recommended that he go to the ED. He denies chest pain, palpitations, orthopnea, lightheadedness/dizziness, syncope.     His cardiologist is Dr. Richard Russ who has told him that he has severe AS and that he will likely need a TAVR. Per patient, he was supposed to undergo a GI evaluation prior to proceeding with TAVR w/u as hemoccult stool was positive x2. He never went GI eval and to his knowledge has had no PRABHAKAR, recent angiogram or cardiac CT.     Allergies  latex (Unknown)  Sulfacetamide Sodium (Unknown)    MEDICATIONS:  amLODIPine   Tablet 5 milliGRAM(s) Oral daily  aspirin enteric coated 81 milliGRAM(s) Oral daily  clopidogrel Tablet 75 milliGRAM(s) Oral daily  furosemide   Injectable 40 milliGRAM(s) IV Push two times a day  atorvastatin 40 milliGRAM(s) Oral at bedtime  dextrose 40% Gel 15 Gram(s) Oral once PRN  dextrose 50% Injectable 12.5 Gram(s) IV Push once  dextrose 50% Injectable 25 Gram(s) IV Push once  dextrose 50% Injectable 25 Gram(s) IV Push once  finasteride 5 milliGRAM(s) Oral daily  glucagon  Injectable 1 milliGRAM(s) IntraMuscular once PRN  insulin glargine Injectable (LANTUS) 9 Unit(s) SubCutaneous at bedtime  insulin lispro (HumaLOG) corrective regimen sliding scale   SubCutaneous three times a day before meals  insulin lispro (HumaLOG) corrective regimen sliding scale   SubCutaneous at bedtime  dextrose 5%. 1000 milliLiter(s) IV Continuous <Continuous>  nystatin Powder 1 Application(s) Topical two times a day      PAST MEDICAL & SURGICAL HISTORY:  CAD (coronary artery disease)  Prostatitis syndrome  UTI (urinary tract infection)  Diabetes  Stented coronary artery  High cholesterol  HTN (hypertension)  Afib  S/p bare metal coronary artery stent      FAMILY HISTORY:  Family history of hypertension (Mother): hx of CVA      SOCIAL HISTORY:    Non-smoker  Denies EtOH, illicit drugs  Retired      REVIEW OF SYSTEMS:  General: no fatigue/malaise, weight loss/gain.  Skin: no rashes.  Ophthalmologic: no blurred vision, no loss of vision. 	  ENT: no sore throat, rhinorrhea, sinus congestion.  Respiratory: see above  Gastrointestinal:  no N/V/D, no melena/hematemesis/hematochezia.  Genitourinary: no dysuria/hesitancy or hematuria.  Musculoskeletal: no myalgias or arthralgias.  Neurological: no changes in vision or hearing, no lightheadedness/dizziness, no syncope/near syncope	  Psychiatric: no unusual stress/anxiety.   Hematology/Lymphatics: no unusual bleeding, bruising and no lymphadenopathy.  Endocrine: no unusual thirst.   All others negative except as stated above and in HPI.    PHYSICAL EXAM:  T(C): 36.8 (01-17-19 @ 12:02), Max: 36.8 (01-17-19 @ 12:02)  HR: 59 (01-17-19 @ 12:02) (42 - 59)  BP: 137/71 (01-17-19 @ 12:02) (135/77 - 163/72)  RR: 18 (01-17-19 @ 12:02) (18 - 18)  SpO2: 97% (01-17-19 @ 12:02) (95% - 99%)  Wt(kg): --  I&O's Summary    16 Jan 2019 07:01  -  17 Jan 2019 07:00  --------------------------------------------------------  IN: 840 mL / OUT: 2315 mL / NET: -1475 mL    17 Jan 2019 07:01  -  17 Jan 2019 14:17  --------------------------------------------------------  IN: 360 mL / OUT: 1000 mL / NET: -640 mL        Appearance: no acute distress  HEENT:   Normal oral mucosa, PERRL, EOMI	  Lymphatic: No lymphadenopathy  Cardiovascular: bradycardic, irregularly irregular, Normal S1 S2, 2/6 systolic murmur, anasarca - 3+ pitting edema of legs, thighs, abdomen, arms  Respiratory: crackles R>L	  Psychiatry: A & O x 3, Mood & affect appropriate  Gastrointestinal:  Soft, Non-tender, + BS	  Skin: No rashes, No ecchymoses, No cyanosis	  Neurologic: Non-focal  Extremities: Normal range of motion, No clubbing, cyanosis or edema  Vascular: Peripheral pulses palpable 2+ bilaterally      LABS:	 	    CBC Full  -  ( 17 Jan 2019 07:07 )  WBC Count : 5.81 K/uL  Hemoglobin : 10.1 g/dL  Hematocrit : 31.7 %  Platelet Count - Automated : 226 K/uL  Mean Cell Volume : 89.0 fl  Mean Cell Hemoglobin : 28.4 pg  Mean Cell Hemoglobin Concentration : 31.9 gm/dL  Auto Neutrophil # : 4.13 K/uL  Auto Lymphocyte # : 0.84 K/uL  Auto Monocyte # : 0.60 K/uL  Auto Eosinophil # : 0.22 K/uL  Auto Basophil # : 0.01 K/uL  Auto Neutrophil % : 71.0 %  Auto Lymphocyte % : 14.5 %  Auto Monocyte % : 10.3 %  Auto Eosinophil % : 3.8 %  Auto Basophil % : 0.2 %    01-17    144  |  115<H>  |  73<H>  ----------------------------<  127<H>  4.0   |  15<L>  |  3.54<H>  01-16    141  |  113<H>  |  73<H>  ----------------------------<  128<H>  4.1   |  13<L>  |  3.66<H>    Ca    8.9      17 Jan 2019 06:12  Ca    8.9      16 Jan 2019 05:49  Phos  5.0     01-16  Mg     1.9     01-16    TPro  6.7  /  Alb  3.7  /  TBili  1.8<H>  /  DBili  x   /  AST  42<H>  /  ALT  43  /  AlkPhos  85  01-15      proBNP:   Lipid Profile:   HgA1c:   TSH:       CARDIAC MARKERS:        TELEMETRY: AFib HR 40-60s 	    EKG:  bradycardic, AFib	  RADIOLOGY:  < from: Xray Chest 1 View AP/PA (01.15.19 @ 18:20) >  IMPRESSION:   Small right pleural effusion.  Cardiomegaly.    < end of copied text >      PREVIOUS DIAGNOSTIC TESTING:    Echocardiogram  < from: Transthoracic Echocardiogram (01.16.19 @ 14:19) >  Dimensions:    Normal Values:  LA:     5.7    2.0 - 4.0 cm  Ao:     3.1    2.0 - 3.8 cm  SEPTUM: 1.3    0.6 - 1.2 cm  PWT:    1.2    0.6 -1.1 cm  LVIDd:  5.8    3.0 - 5.6 cm  LVIDs:  4.8    1.8 - 4.0 cm  Derived variables:  LVMI: 155 g/m2  RWT: 0.41  Fractional short: 17 %  EF (Visual Estimate): 45 %  Doppler Peak Velocity (m/sec): AoV=4.2  ------------------------------------------------------------------------  Conclusions:  1. Mitral annular calcification. Thickened and tethered  mitral valve leaflets. Moderate mitral regurgitation.  2. Calcified aortic valve with decreased opening.  Morphology is not well seen. Cannot rule out bicuspid  valve. Peak transaortic valve gradient equals 71 mm Hg,  mean transaortic valve gradient equals 38 mm Hg, estimated  aortic valve area equals 0.8 sqcm (by continuity equation),  aortic valve velocity time integral equals 99 cm,  consistent with severe aortic stenosis. Mild aortic  regurgitation.  3. Severely dilated left atrium.  LA volume index = 57  cc/m2.  4. Eccentric left ventricular hypertrophy (dilated left  ventricle with normal relative wall thickness).  5. Mild global left ventricular systolic dysfunction.  Flattening of the interventricular septum in both systole  and diastole is  consistent with right ventricular pressure  overload.  6. Right ventricular enlargement with decreased right  ventricular systolic function.  7. Normal tricuspid valve. Moderate-severe tricuspid  regurgitation.  8. Estimated pulmonary artery systolic pressure equals 71  mm Hg, assuming right atrial pressure equals 10 mm Hg,  consistent with severe pulmonary pressures.  *** No previous Echo exam.    < end of copied text >      Catheterization  < from: Cardiac Cath Lab - Adult (05.11.17 @ 12:57) >  PROCEDURE:  --  Left coronary angiography.  --  Right coronary angiography.  --  Intervention on mid LAD: drug-eluting stent.  --  Intervention on D2: balloon angioplasty.  CORONARY VESSELS: The coronary circulation is right dominant.  LM:   --  LM: Angiography showed minor luminal irregularities with no flow  limiting lesions.  LAD:   --  Proximal LAD: There was a discrete 40 % stenosis at the site of  a prior stent, in-stent.  --  Mid LAD: There was a 70 % stenosis.  --  D2: There was a 70 % stenosis.  CX:   --  Circumflex: Angiography showed minor luminal irregularities with  no flow limiting lesions.  --  OM1: Angiography showed minor luminal irregularities with no flow  limiting lesions.  RCA:   --  Proximal RCA: There was a 40 % stenosis. The lesion was  irregularly contoured.  --  RPDA: There was a 100 % stenosis. There was good collateral blood  supply to the distal myocardium.  COMPLICATIONS: There were no complications.  DIAGNOSTIC IMPRESSIONS: Compared to prior films there is now severe mid LAD  and diag disease as well as an occluded RPDA filling via collaterals.  INTERVENTIONAL RECOMMENDATIONS: Aspirin and Plavix (successful stenting of  the mid LAD and POBA to D2)    < end of copied text >      ASSESSMENT/PLAN:  78M with PMH of CAD s/p DAYANA to mLAD and POBA to D2 5/2017 with Dr. Harris, AFib on warfarin, T2DM, HLD, HTN who presented from home with worsening PEREZ and LE edema x3 months. TTE revealed EF 45%, severe AS, mild global LV dysfunction and severe pHTN. He is being diuresed with IV Lasix, but continues to have anasarca. Cr 3.7 on admission, now 3.5. Nephrology following for ELADIA.     #Severe AS  #Acute on chronic HFrEF  - Continue diuresis with Lasix   - Patient needs to be diuresed more before proceed with further w/u     #CAD s/p DAYANA  - Continue DAPT, statin	    #AFib  - [id number]: 6  - INR 7.6 on 1/16  - Continue warfarin, INR goal 2-3    LEFTY Mckeon  Cardiology Fellow   [id number] CHIEF COMPLAINT: PEREZ, LE edema    HISTORY OF PRESENT ILLNESS:  78M with PMH of CAD s/p DAYANA to mLAD and POBA to D2 5/2017 with Dr. Harris, AFib on warfarin, T2DM, HLD, HTN who presented from home with worsening PEREZ and LE edema x3 months. Patient lives alone at home and reports that his exertional capacity has significantly decreased over the past few months to the point that he could not walk from his bedroom to bathroom. He has a visiting RN who checked on him on day of presentation who spoke with his PMD and it was recommended that he go to the ED. He denies chest pain, palpitations, orthopnea, lightheadedness/dizziness, syncope.     His cardiologist is Dr. Richard Russ who has told him that he has severe AS and that he will likely need a TAVR. Per patient, he was supposed to undergo a GI evaluation prior to proceeding with TAVR w/u as hemoccult stool was positive x2. He never went GI eval and to his knowledge has had no PRABHAKAR, recent angiogram or cardiac CT.     Allergies  latex (Unknown)  Sulfacetamide Sodium (Unknown)    MEDICATIONS:  amLODIPine   Tablet 5 milliGRAM(s) Oral daily  aspirin enteric coated 81 milliGRAM(s) Oral daily  clopidogrel Tablet 75 milliGRAM(s) Oral daily  furosemide   Injectable 40 milliGRAM(s) IV Push two times a day  atorvastatin 40 milliGRAM(s) Oral at bedtime  dextrose 40% Gel 15 Gram(s) Oral once PRN  dextrose 50% Injectable 12.5 Gram(s) IV Push once  dextrose 50% Injectable 25 Gram(s) IV Push once  dextrose 50% Injectable 25 Gram(s) IV Push once  finasteride 5 milliGRAM(s) Oral daily  glucagon  Injectable 1 milliGRAM(s) IntraMuscular once PRN  insulin glargine Injectable (LANTUS) 9 Unit(s) SubCutaneous at bedtime  insulin lispro (HumaLOG) corrective regimen sliding scale   SubCutaneous three times a day before meals  insulin lispro (HumaLOG) corrective regimen sliding scale   SubCutaneous at bedtime  dextrose 5%. 1000 milliLiter(s) IV Continuous <Continuous>  nystatin Powder 1 Application(s) Topical two times a day      PAST MEDICAL & SURGICAL HISTORY:  CAD (coronary artery disease)  Prostatitis syndrome  UTI (urinary tract infection)  Diabetes  Stented coronary artery  High cholesterol  HTN (hypertension)  Afib  S/p bare metal coronary artery stent      FAMILY HISTORY:  Family history of hypertension (Mother): hx of CVA      SOCIAL HISTORY:    Non-smoker  Denies EtOH, illicit drugs  Retired      REVIEW OF SYSTEMS:  General: no fatigue/malaise, weight loss/gain.  Skin: no rashes.  Ophthalmologic: no blurred vision, no loss of vision. 	  ENT: no sore throat, rhinorrhea, sinus congestion.  Respiratory: see above  Gastrointestinal:  no N/V/D, no melena/hematemesis/hematochezia.  Genitourinary: no dysuria/hesitancy or hematuria.  Musculoskeletal: no myalgias or arthralgias.  Neurological: no changes in vision or hearing, no lightheadedness/dizziness, no syncope/near syncope	  Psychiatric: no unusual stress/anxiety.   Hematology/Lymphatics: no unusual bleeding, bruising and no lymphadenopathy.  Endocrine: no unusual thirst.   All others negative except as stated above and in HPI.    PHYSICAL EXAM:  T(C): 36.8 (01-17-19 @ 12:02), Max: 36.8 (01-17-19 @ 12:02)  HR: 59 (01-17-19 @ 12:02) (42 - 59)  BP: 137/71 (01-17-19 @ 12:02) (135/77 - 163/72)  RR: 18 (01-17-19 @ 12:02) (18 - 18)  SpO2: 97% (01-17-19 @ 12:02) (95% - 99%)  Wt(kg): --  I&O's Summary    16 Jan 2019 07:01  -  17 Jan 2019 07:00  --------------------------------------------------------  IN: 840 mL / OUT: 2315 mL / NET: -1475 mL    17 Jan 2019 07:01  -  17 Jan 2019 14:17  --------------------------------------------------------  IN: 360 mL / OUT: 1000 mL / NET: -640 mL        Appearance: no acute distress  HEENT:   Normal oral mucosa, PERRL, EOMI	  Lymphatic: No lymphadenopathy  Cardiovascular: bradycardic, irregularly irregular, Normal S1 S2, 2/6 systolic murmur, anasarca - 3+ pitting edema of legs, thighs, abdomen, arms  Respiratory: crackles R>L	  Psychiatry: A & O x 3, Mood & affect appropriate  Gastrointestinal:  Soft, Non-tender, + BS	  Skin: No rashes, No ecchymoses, No cyanosis	  Neurologic: Non-focal  Extremities: Normal range of motion, No clubbing, cyanosis or edema  Vascular: Peripheral pulses palpable 2+ bilaterally      LABS:	 	    CBC Full  -  ( 17 Jan 2019 07:07 )  WBC Count : 5.81 K/uL  Hemoglobin : 10.1 g/dL  Hematocrit : 31.7 %  Platelet Count - Automated : 226 K/uL  Mean Cell Volume : 89.0 fl  Mean Cell Hemoglobin : 28.4 pg  Mean Cell Hemoglobin Concentration : 31.9 gm/dL  Auto Neutrophil # : 4.13 K/uL  Auto Lymphocyte # : 0.84 K/uL  Auto Monocyte # : 0.60 K/uL  Auto Eosinophil # : 0.22 K/uL  Auto Basophil # : 0.01 K/uL  Auto Neutrophil % : 71.0 %  Auto Lymphocyte % : 14.5 %  Auto Monocyte % : 10.3 %  Auto Eosinophil % : 3.8 %  Auto Basophil % : 0.2 %    01-17    144  |  115<H>  |  73<H>  ----------------------------<  127<H>  4.0   |  15<L>  |  3.54<H>  01-16    141  |  113<H>  |  73<H>  ----------------------------<  128<H>  4.1   |  13<L>  |  3.66<H>    Ca    8.9      17 Jan 2019 06:12  Ca    8.9      16 Jan 2019 05:49  Phos  5.0     01-16  Mg     1.9     01-16    TPro  6.7  /  Alb  3.7  /  TBili  1.8<H>  /  DBili  x   /  AST  42<H>  /  ALT  43  /  AlkPhos  85  01-15      proBNP:   Lipid Profile:   HgA1c:   TSH:       CARDIAC MARKERS:        TELEMETRY: AFib HR 40-60s 	    EKG:  bradycardic, AFib	  RADIOLOGY:  < from: Xray Chest 1 View AP/PA (01.15.19 @ 18:20) >  IMPRESSION:   Small right pleural effusion.  Cardiomegaly.    < end of copied text >      PREVIOUS DIAGNOSTIC TESTING:    Echocardiogram  < from: Transthoracic Echocardiogram (01.16.19 @ 14:19) >  Dimensions:    Normal Values:  LA:     5.7    2.0 - 4.0 cm  Ao:     3.1    2.0 - 3.8 cm  SEPTUM: 1.3    0.6 - 1.2 cm  PWT:    1.2    0.6 -1.1 cm  LVIDd:  5.8    3.0 - 5.6 cm  LVIDs:  4.8    1.8 - 4.0 cm  Derived variables:  LVMI: 155 g/m2  RWT: 0.41  Fractional short: 17 %  EF (Visual Estimate): 45 %  Doppler Peak Velocity (m/sec): AoV=4.2  ------------------------------------------------------------------------  Conclusions:  1. Mitral annular calcification. Thickened and tethered  mitral valve leaflets. Moderate mitral regurgitation.  2. Calcified aortic valve with decreased opening.  Morphology is not well seen. Cannot rule out bicuspid  valve. Peak transaortic valve gradient equals 71 mm Hg,  mean transaortic valve gradient equals 38 mm Hg, estimated  aortic valve area equals 0.8 sqcm (by continuity equation),  aortic valve velocity time integral equals 99 cm,  consistent with severe aortic stenosis. Mild aortic  regurgitation.  3. Severely dilated left atrium.  LA volume index = 57  cc/m2.  4. Eccentric left ventricular hypertrophy (dilated left  ventricle with normal relative wall thickness).  5. Mild global left ventricular systolic dysfunction.  Flattening of the interventricular septum in both systole  and diastole is  consistent with right ventricular pressure  overload.  6. Right ventricular enlargement with decreased right  ventricular systolic function.  7. Normal tricuspid valve. Moderate-severe tricuspid  regurgitation.  8. Estimated pulmonary artery systolic pressure equals 71  mm Hg, assuming right atrial pressure equals 10 mm Hg,  consistent with severe pulmonary pressures.  *** No previous Echo exam.    < end of copied text >      Catheterization  < from: Cardiac Cath Lab - Adult (05.11.17 @ 12:57) >  PROCEDURE:  --  Left coronary angiography.  --  Right coronary angiography.  --  Intervention on mid LAD: drug-eluting stent.  --  Intervention on D2: balloon angioplasty.  CORONARY VESSELS: The coronary circulation is right dominant.  LM:   --  LM: Angiography showed minor luminal irregularities with no flow  limiting lesions.  LAD:   --  Proximal LAD: There was a discrete 40 % stenosis at the site of  a prior stent, in-stent.  --  Mid LAD: There was a 70 % stenosis.  --  D2: There was a 70 % stenosis.  CX:   --  Circumflex: Angiography showed minor luminal irregularities with  no flow limiting lesions.  --  OM1: Angiography showed minor luminal irregularities with no flow  limiting lesions.  RCA:   --  Proximal RCA: There was a 40 % stenosis. The lesion was  irregularly contoured.  --  RPDA: There was a 100 % stenosis. There was good collateral blood  supply to the distal myocardium.  COMPLICATIONS: There were no complications.  DIAGNOSTIC IMPRESSIONS: Compared to prior films there is now severe mid LAD  and diag disease as well as an occluded RPDA filling via collaterals.  INTERVENTIONAL RECOMMENDATIONS: Aspirin and Plavix (successful stenting of  the mid LAD and POBA to D2)    < end of copied text >      ASSESSMENT/PLAN:  78M with PMH of CAD s/p DAYANA to mLAD and POBA to D2 5/2017 with Dr. Harris, AFib on warfarin, T2DM, HLD, HTN who presented from home with worsening PEREZ and LE edema x3 months. TTE revealed EF 45%, severe AS, mild global LV dysfunction and severe pHTN. He is being diuresed with IV Lasix, but continues to have anasarca. Cr 3.7 on admission, now 3.5. Nephrology following for ELADIA.     #Severe AS  #Acute on chronic HFrEF  - Continue diuresis with Lasix   - Patient needs to be diuresed more before proceed with further w/u   - Will ask structural team to evaluate    #CAD s/p DAYANA  - Continue DAPT, statin	    #AFib  - [id number]: 6  - INR 7.6 on 1/16  - Continue warfarin, INR goal 2-3    LEFTY Mckeon  Cardiology Fellow   [id number]

## 2019-01-17 NOTE — PROCEDURE NOTE - NSURITECHNIQUE_GU_A_CORE
The urinary drainage system is closed at the end of the procedure/Proper hand hygiene was performed/Sterile gloves were worn for the duration of the procedure/The catheter was appropriately lubricated

## 2019-01-17 NOTE — CHART NOTE - NSCHARTNOTEFT_GEN_A_CORE
CC: SOB    Notified by RN; pt. is complaining of shortness of breath. Pt. seen and examined at bedside. Pt. reports dyspnea and dizziness. Pt. originally admitted w/ CHF exacerbation but has a history of severe AS and is pending an official TAVR consult.     HPI:  NIGHT HOSPITALIST:   Patient UNKNOWN to me previously, assigned to me at this point via the ER to admit this 79 y/o M--patient with a history of CAD with past PCI, chronic atrial fibrillation on Coumadin (patient was told by his office physician above to stop his Coumadin and go to the ER), essential HTN, unspecified cardiac valve disorder, reported unspecified hemoccult positive stools in the office, type 2 DM on metformin, with the patient self referring to [facility] directed by his office physician following an elevated INR and episode of epistaxis this AM following apparently 3 months of progressive dyspnoea and B/L LE oedema with poor aerobic functional status with dyspnoea with changing his clothes or ambulation to the BR.   Patient denies chest pain/pressure.  NO dyspnoea at present.  NO HA< no focal weakness.   NO abdominal pain, no red blood per rectum or melena.  NO back pain, no tearing back pain.   NO dysuria, no haematuria.   Denies weight loss or anorexia.  No rash.   Notes 3-4 months of persistent scrotal oedema.  No penile pain, no scrotal pain.  No leg pain.  Remaining review of systems not contributory. (15 Lazaro 2019 23:54)      Allergies    latex (Unknown)  Sulfacetamide Sodium (Unknown)    Intolerances        FAMILY HISTORY:  Family history of hypertension (Mother): hx of CVA      PAST MEDICAL & SURGICAL HISTORY:  CAD (coronary artery disease)  Prostatitis syndrome  UTI (urinary tract infection)  Diabetes  Stented coronary artery  High cholesterol  HTN (hypertension)  Afib  S/p bare metal coronary artery stent      REVIEW OF SYSTEMS:  CONSTITUTIONAL: No fever, weight loss, or fatigue  EYES: No eye pain, visual disturbances, or discharge  ENMT:  No difficulty hearing, tinnitus, vertigo; No sinus or throat pain  NECK: No pain or stiffness  BREASTS: No pain, masses, or nipple discharge  RESPIRATORY: No cough, wheezing, chills or hemoptysis; No shortness of breath  CARDIOVASCULAR: No chest pain, palpitations, dizziness, or leg swelling  GASTROINTESTINAL: No abdominal or epigastric pain. No nausea, vomiting, or hematemesis; No diarrhea or constipation. No melena or hematochezia.  GENITOURINARY: No dysuria, frequency, hematuria, or incontinence  NEUROLOGICAL: No headaches, memory loss, loss of strength, numbness, or tremors  SKIN: No itching, burning, rashes, or lesions   LYMPH NODES: No enlarged glands  ENDOCRINE: No heat or cold intolerance; No hair loss  MUSCULOSKELETAL: No joint pain or swelling; No muscle, back, or extremity pain  PSYCHIATRIC: No depression, anxiety, mood swings, or difficulty sleeping  HEME/LYMPH: No easy bruising, or bleeding gums  ALLERY AND IMMUNOLOGIC: No hives or eczema    Vital Signs Last 24 Hrs  T(C): 36.8 (2019 12:02), Max: 36.8 (2019 12:02)  T(F): 98.2 (2019 12:02), Max: 98.2 (2019 12:02)  HR: 55 (2019 18:25) (49 - 59)  BP: 159/90 (2019 18:25) (135/77 - 163/72)  BP(mean): --  RR: 18 (2019 18:25) (18 - 18)  SpO2: 96% (2019 18:25) (95% - 99%)    PHYSICAL EXAM:  GENERAL: NAD, well-groomed, well-developed  HEAD:  Atraumatic, Normocephalic  EYES: EOMI, PERRLA, conjunctiva and sclera clear  ENMT: No tonsillar erythema, exudates, or enlargement; Moist mucous membranes, Good dentition, No lesions  NECK: Supple, No JVD, Normal thyroid  NERVOUS SYSTEM:  Alert & Oriented X3, Good concentration; Motor Strength 5/5 B/L upper and lower extremities; DTRs 2+ intact and symmetric  CHEST/LUNG: Clear to percussion bilaterally; No rales, rhonchi, wheezing, or rubs  HEART: Regular rate and rhythm; No murmurs, rubs, or gallops  ABDOMEN: Soft, Nontender, Nondistended; Bowel sounds present  EXTREMITIES:  2+ Peripheral Pulses, No clubbing, cyanosis, or edema  LYMPH: No lymphadenopathy noted  SKIN: No rashes or lesions    LABS:                        10.1   5.81  )-----------( 226      ( 2019 07:07 )             31.7     -    144  |  115<H>  |  73<H>  ----------------------------<  127<H>  4.0   |  15<L>  |  3.54<H>    Ca    8.9      2019 06:12  Phos  5.0     -  Mg     1.9     01-16      PT/INR - ( 2019 06:58 )   PT: 43.1 sec;   INR: 3.63 ratio         PTT - ( 2019 08:15 )  PTT:57.7 sec  Urinalysis Basic - ( 2019 08:36 )    Color: Yellow / Appearance: Slightly Turbid / S.011 / pH: x  Gluc: x / Ketone: Negative  / Bili: Negative / Urobili: Negative mg/dL   Blood: x / Protein: Negative mg/dL / Nitrite: Negative   Leuk Esterase: Large / RBC: 10 /HPF /  /HPF   Sq Epi: x / Non Sq Epi: 0 /HPF / Bacteria: Negative      CAPILLARY BLOOD GLUCOSE      POCT Blood Glucose.: 195 mg/dL (2019 16:35)  POCT Blood Glucose.: 193 mg/dL (2019 12:13)  POCT Blood Glucose.: 120 mg/dL (2019 07:51)  POCT Blood Glucose.: 204 mg/dL (2019 21:34)      Urinalysis Basic - ( 2019 08:36 )    Color: Yellow / Appearance: Slightly Turbid / S.011 / pH: x  Gluc: x / Ketone: Negative  / Bili: Negative / Urobili: Negative mg/dL   Blood: x / Protein: Negative mg/dL / Nitrite: Negative   Leuk Esterase: Large / RBC: 10 /HPF /  /HPF   Sq Epi: x / Non Sq Epi: 0 /HPF / Bacteria: Negative          RADIOLOGY:      ASSESSMENT/PLAN  Pt. is a   HPI:  NIGHT HOSPITALIST:   Patient UNKNOWN to me previously, assigned to me at this point via the ER to admit this 79 y/o M--patient with a history of CAD with past PCI, chronic atrial fibrillation on Coumadin (patient was told by his office physician above to stop his Coumadin and go to the ER), essential HTN, unspecified cardiac valve disorder, reported unspecified hemoccult positive stools in the office, type 2 DM on metformin, with the patient self referring to [facility] directed by his office physician following an elevated INR and episode of epistaxis this AM following apparently 3 months of progressive dyspnoea and B/L LE oedema with poor aerobic functional status with dyspnoea with changing his clothes or ambulation to the BR.   Patient denies chest pain/pressure.  NO dyspnoea at present.  NO HA< no focal weakness.   NO abdominal pain, no red blood per rectum or melena.  NO back pain, no tearing back pain.   NO dysuria, no haematuria.   Denies weight loss or anorexia.  No rash.   Notes 3-4 months of persistent scrotal oedema.  No penile pain, no scrotal pain.  No leg pain.  Remaining review of systems not contributory. (15 Lazaro 2019 23:54)      -Laney Barnes PA-C. # CC: SOB    Notified by RN; pt. is complaining of shortness of breath. Pt. seen and examined at bedside. Pt. reports dyspnea and dizziness. Pt. has audible crackles and is wheezing auscultation. Pt. originally admitted w/ CHF exacerbation but has a history of severe AS and is pending an official TAVR consult. Denies chest pain, HA, lightheadedness, palpitations, N/V, abdominal pain, cough, and syncope. Vitals are stable but pt. is hypoxic to high 80's- low 90's.     Allergies  latex (Unknown)  Sulfacetamide Sodium (Unknown)    FAMILY HISTORY:  Family history of hypertension (Mother): hx of CVA    PAST MEDICAL & SURGICAL HISTORY:  CAD (coronary artery disease)  Prostatitis syndrome  UTI (urinary tract infection)  Diabetes  Stented coronary artery  High cholesterol  HTN (hypertension)  Afib  S/p bare metal coronary artery stent    REVIEW OF SYSTEMS:  CONSTITUTIONAL: No fever, weight loss, or fatigue  EYES: No eye pain, visual disturbances, or discharge  ENMT:  No difficulty hearing, tinnitus, vertigo; No sinus or throat pain  NECK: No pain or stiffness  BREASTS: No pain, masses, or nipple discharge  RESPIRATORY: +wheeze and sob   CARDIOVASCULAR: No chest pain, palpitations, dizziness, or leg swelling  GASTROINTESTINAL: No abdominal or epigastric pain. No nausea, vomiting, or hematemesis; No diarrhea or constipation. No melena or hematochezia.  GENITOURINARY: No dysuria, frequency, hematuria, or incontinence  NEUROLOGICAL: +dizziness   SKIN: No itching, burning, rashes, or lesions   LYMPH NODES: No enlarged glands  ENDOCRINE: No heat or cold intolerance; No hair loss  MUSCULOSKELETAL: No joint pain or swelling; No muscle, back, or extremity pain  PSYCHIATRIC: No depression, anxiety, mood swings, or difficulty sleeping  HEME/LYMPH: No easy bruising, or bleeding gums  ALLERY AND IMMUNOLOGIC: No hives or eczema    Vital Signs Last 24 Hrs  T(C): 36.8 (2019 12:02), Max: 36.8 (2019 12:02)  T(F): 98.2 (2019 12:02), Max: 98.2 (2019 12:02)  HR: 55 (2019 18:25) (49 - 59)  BP: 159/90 (2019 18:25) (135/77 - 163/72)  RR: 18 (2019 18:25) (18 - 18)  SpO2: 96% (2019 18:25) (95% - 99%)    PHYSICAL EXAM:  GENERAL: Dyspneic but A&Ox3  HEAD:  Atraumatic, Normocephalic  EYES: EOMI, PERRLA, conjunctiva and sclera clear  ENMT: No tonsillar erythema, exudates, or enlargement; Moist mucous membranes, Good dentition, No lesions  NECK: Supple, No JVD, Normal thyroid  NERVOUS SYSTEM:  Alert & Oriented X3  CHEST/LUNG: +wheezing, B/L basilar crackles   HEART: Regular rate and rhythm; +murmur   ABDOMEN: Soft, Nontender, Nondistended; Bowel sounds present  EXTREMITIES:  2+ Peripheral Pulses, mild edema   LYMPH: No lymphadenopathy noted  SKIN: No rashes or lesions    LABS:                        10.1   5.81  )-----------( 226      ( 2019 07:07 )             31.7     01-    144  |  115<H>  |  73<H>  ----------------------------<  127<H>  4.0   |  15<L>  |  3.54<H>    Ca    8.9      2019 06:12  Phos  5.0     -16  Mg     1.9     01-16      PT/INR - ( 2019 06:58 )   PT: 43.1 sec;   INR: 3.63 ratio    PTT - ( 2019 08:15 )  PTT:57.7 sec    Urinalysis Basic - ( 2019 08:36 )  Color: Yellow / Appearance: Slightly Turbid / S.011 / pH: x  Gluc: x / Ketone: Negative  / Bili: Negative / Urobili: Negative mg/dL   Blood: x / Protein: Negative mg/dL / Nitrite: Negative   Leuk Esterase: Large / RBC: 10 /HPF /  /HPF   Sq Epi: x / Non Sq Epi: 0 /HPF / Bacteria: Negative    CAPILLARY BLOOD GLUCOSE  POCT Blood Glucose.: 195 mg/dL (2019 16:35)  POCT Blood Glucose.: 193 mg/dL (2019 12:13)  POCT Blood Glucose.: 120 mg/dL (2019 07:51)  POCT Blood Glucose.: 204 mg/dL (2019 21:34)      ASSESSMENT/PLAN  Pt. is a 79 y/o M with a history of CAD with past PCI, chronic atrial fibrillation on Coumadin, essential HTN, unspecified cardiac valve disorder, reported unspecified hemoccult positive stools in the office, type 2 DM on metformin originally admitted w/ CHF exacerbation and supratherapeutic INR is now being seen for SOB    1) SOB likely secondary to CHF exacerbation; r/o PNA vs PE vs underlying viral infection vs AS  -Pt. is afebrile but hypoxic to high 80's low 90s while on NC. Rest of vitals are baseline.  -Duonebs ordered PRN  -Ventimask placed but pt. is refusing the mask. Hiflo ordered.   -CBC, BMP, Mg, P, and cardiac enzymes ordered. Enzymes are negative.   -ABG ordered shows unchanged metabolic acidosis.    -CXR ordered. As per preliminary read of CXR, increased pulmonary congestion compared to previous CXR.  -Pt. received evening dose of IV lasix 40mg at 6PM. Will give additional dose of IV Lasix 20mg x1 now.  -Continue diureses as per cardiology. Monitor for ELADIA. Current Cr at 3.31.  -RVP ordered to r/o viral process.  -Consider Noncontrast CT of chest to r/o PNA. No PNA as per prelim read of CXR. Previous CT chest shows pulmonary edema/pleural effusions but no reported PNA.  -Pt. is off coumadin secondary to supratherapeutic INR. PE is on differential. Consider V/Q scan if pt. continues to be hypoxic as Cr is elevated for CTA.   -F/u cardiology reccs in AM.  -F/u renal reccs in AM.  -Consult pulmonology         -Laney Barnes PA-C. # CC: SOB    Notified by RN; pt. is complaining of shortness of breath. Pt. seen and examined at bedside. Pt. reports dyspnea and dizziness. Pt. has audible crackles and is wheezing auscultation. Pt. originally admitted w/ CHF exacerbation but has a history of severe AS and is pending an official TAVR consult. Denies chest pain, HA, lightheadedness, palpitations, N/V, abdominal pain, cough, and syncope. Vitals are stable but pt. is hypoxic to high 80's- low 90's. On telemetry pt. has been having infrequent Polymorphic beats of Vtach     Allergies  latex (Unknown)  Sulfacetamide Sodium (Unknown)    FAMILY HISTORY:  Family history of hypertension (Mother): hx of CVA    PAST MEDICAL & SURGICAL HISTORY:  CAD (coronary artery disease)  Prostatitis syndrome  UTI (urinary tract infection)  Diabetes  Stented coronary artery  High cholesterol  HTN (hypertension)  Afib  S/p bare metal coronary artery stent    REVIEW OF SYSTEMS:  CONSTITUTIONAL: No fever, weight loss, or fatigue  EYES: No eye pain, visual disturbances, or discharge  ENMT:  No difficulty hearing, tinnitus, vertigo; No sinus or throat pain  NECK: No pain or stiffness  BREASTS: No pain, masses, or nipple discharge  RESPIRATORY: +wheeze and sob   CARDIOVASCULAR: No chest pain, palpitations, dizziness, or leg swelling  GASTROINTESTINAL: No abdominal or epigastric pain. No nausea, vomiting, or hematemesis; No diarrhea or constipation. No melena or hematochezia.  GENITOURINARY: No dysuria, frequency, hematuria, or incontinence  NEUROLOGICAL: +dizziness   SKIN: No itching, burning, rashes, or lesions   LYMPH NODES: No enlarged glands  ENDOCRINE: No heat or cold intolerance; No hair loss  MUSCULOSKELETAL: No joint pain or swelling; No muscle, back, or extremity pain  PSYCHIATRIC: No depression, anxiety, mood swings, or difficulty sleeping  HEME/LYMPH: No easy bruising, or bleeding gums  ALLERY AND IMMUNOLOGIC: No hives or eczema    Vital Signs Last 24 Hrs  T(C): 36.8 (2019 12:02), Max: 36.8 (2019 12:02)  T(F): 98.2 (2019 12:02), Max: 98.2 (2019 12:02)  HR: 55 (2019 18:25) (49 - 59)  BP: 159/90 (2019 18:25) (135/77 - 163/72)  RR: 18 (2019 18:25) (18 - 18)  SpO2: 96% (2019 18:25) (95% - 99%)    PHYSICAL EXAM:  GENERAL: Dyspneic but A&Ox3  HEAD:  Atraumatic, Normocephalic  EYES: EOMI, PERRLA, conjunctiva and sclera clear  ENMT: No tonsillar erythema, exudates, or enlargement; Moist mucous membranes, Good dentition, No lesions  NECK: Supple, No JVD, Normal thyroid  NERVOUS SYSTEM:  Alert & Oriented X3  CHEST/LUNG: +wheezing, B/L basilar crackles   HEART: Regular rate and rhythm; +murmur   ABDOMEN: Soft, Nontender, Nondistended; Bowel sounds present  EXTREMITIES:  2+ Peripheral Pulses, mild edema   LYMPH: No lymphadenopathy noted  SKIN: No rashes or lesions    LABS:                        10.1   5.81  )-----------( 226      ( 2019 07:07 )             31.7     01-17    144  |  115<H>  |  73<H>  ----------------------------<  127<H>  4.0   |  15<L>  |  3.54<H>    Ca    8.9      2019 06:12  Phos  5.0     01-16  Mg     1.9     01-16      PT/INR - ( 2019 06:58 )   PT: 43.1 sec;   INR: 3.63 ratio    PTT - ( 2019 08:15 )  PTT:57.7 sec    Urinalysis Basic - ( 2019 08:36 )  Color: Yellow / Appearance: Slightly Turbid / S.011 / pH: x  Gluc: x / Ketone: Negative  / Bili: Negative / Urobili: Negative mg/dL   Blood: x / Protein: Negative mg/dL / Nitrite: Negative   Leuk Esterase: Large / RBC: 10 /HPF /  /HPF   Sq Epi: x / Non Sq Epi: 0 /HPF / Bacteria: Negative    CAPILLARY BLOOD GLUCOSE  POCT Blood Glucose.: 195 mg/dL (2019 16:35)  POCT Blood Glucose.: 193 mg/dL (2019 12:13)  POCT Blood Glucose.: 120 mg/dL (2019 07:51)  POCT Blood Glucose.: 204 mg/dL (2019 21:34)      ASSESSMENT/PLAN  Pt. is a 77 y/o M with a history of CAD with past PCI, chronic atrial fibrillation on Coumadin, essential HTN, unspecified cardiac valve disorder, reported unspecified hemoccult positive stools in the office, type 2 DM on metformin originally admitted w/ CHF exacerbation and supratherapeutic INR is now being seen for SOB    1) SOB likely secondary to CHF exacerbation; r/o PNA vs PE vs underlying viral infection vs AS  -Pt. is afebrile but hypoxic to high 80's low 90s while on NC. Rest of vitals are baseline.  -Duonebs ordered PRN  -Ventimask placed but pt. is refusing the mask. Hiflo ordered. Keep O2 sat over 90%. Continuous pulse oximetry.   -CBC, BMP, Mg, P, and cardiac enzymes ordered. Enzymes are negative.   -ABG ordered shows unchanged metabolic acidosis.    -CXR ordered. As per preliminary read of CXR, increased pulmonary congestion compared to previous CXR.  -Pt. received evening dose of IV lasix 40mg at 6PM. Will give additional dose of IV Lasix 20mg x1 now.  -Continue diureses as per cardiology. Monitor for ELADIA. Current Cr at 3.31.  -RVP ordered to r/o viral process.  -Consider Noncontrast CT of chest to r/o PNA. No PNA as per prelim read of CXR. Previous CT chest shows pulmonary edema/pleural effusions but no reported PNA.  -Pt. is off coumadin secondary to supratherapeutic INR. PE is on differential. Consider V/Q scan if pt. continues to be hypoxic as Cr is elevated for CTA.   -Consider BiPAP but pt. is not retaining CO2 on ABG.   -F/u cardiology reccs in AM. Consider increasing diuresis.   -F/u renal reccs in AM.  -Consult pulmonology in AM.    2) Infrequent beats of Polymorphic Vtach   -Pt. has had 7, 8 and 9 beats of ?polymorphic wide complex tachycardia  -Does not appear to be [place]   -Mg level 1.9.  -Will give IV magnesium 1 gram x1. Repeat level in AM.  -Keep K>4 and Mg>2  -F/u cardiology in AM.    -Laney Barnes PA-C. #[id number]

## 2019-01-17 NOTE — PROGRESS NOTE ADULT - PROBLEM SELECTOR PLAN 1
echo with EF 45 % with severe AS  IV Lasix at 40 mg IV BID for now.    Card eval for possible need for valve surg sooner than later given presentation   Daily weights strict I+Os.   resumed back on ASA given no evidence of hemothroax on CT. cont Plavix.

## 2019-01-17 NOTE — PROGRESS NOTE ADULT - PROBLEM SELECTOR PLAN 4
Currently stable however would hold Lisinopril in setting of ELADIA and Coreg as per cardiology team

## 2019-01-17 NOTE — PROGRESS NOTE ADULT - PROBLEM SELECTOR PLAN 1
Likely cardiorenal in nature however should rule out other causes as well including dehydration/hypovolemia, obstruction  - Renal sono: R/L Kidney 12 cm with some echogenicity  - Check BMP daily  - Avoid nephrotoxic agents including NSAIDs, RCA, ACE/ARB  - Hold lisinopril from home regimen  - Hold oral hypoglycemics including Metformin in setting of ELADIA and acidosis  - Strict I/Os.

## 2019-01-17 NOTE — PROGRESS NOTE ADULT - PROBLEM SELECTOR PLAN 3
Likely secondary to CHF exacerbation  - Patient with L and R sided heart failure with severe AS by valve size and dilated LA along with severe pHTN likely secondary to heart failure. Will likely require intervention for valve and continued diuresis to help overall functional status and for resolution of kidney function  - Continue 40 mg IV BID Lasix  - Strict I/Os.

## 2019-01-17 NOTE — PROGRESS NOTE ADULT - ATTENDING COMMENTS
I have seen this patient with the fellow and agree with their assessment and plan. In addition, continuing diuresis given L and R sided CHF. Be mindful of her AS. Might require cards eval vs CHF team for further management    Devonte Brower MD  Cell   Pager   Office

## 2019-01-17 NOTE — PROCEDURE NOTE - PROCEDURE
<<-----Click on this checkbox to enter Procedure Urethral catheterization  01/17/2019    Active  [id number]

## 2019-01-17 NOTE — PROGRESS NOTE ADULT - PROBLEM SELECTOR PLAN 3
Will HOLD ACE and metformin and will follow Cr on Lasix as above.     Urine protein/Cr and microalbumin.    Renal US without obstruction.

## 2019-01-17 NOTE — PROGRESS NOTE ADULT - SUBJECTIVE AND OBJECTIVE BOX
[facility] Division of Kidney Diseases & Hypertension  FOLLOW UP NOTE  [phone]--------------------------------------------------------------------------------  Chief Complaint:Heart failure      24 hour events/subjective: No acute events overnight. Patient resting comfortably in bed and breathing appears to be less labored then yesterday. Patient has no major complaints during time of exam.        PAST HISTORY  --------------------------------------------------------------------------------  No significant changes to PMH, PSH, FHx, SHx, unless otherwise noted    ALLERGIES & MEDICATIONS  --------------------------------------------------------------------------------  Allergies    latex (Unknown)  Sulfacetamide Sodium (Unknown)    Intolerances      Standing Inpatient Medications  amLODIPine   Tablet 5 milliGRAM(s) Oral daily  aspirin enteric coated 81 milliGRAM(s) Oral daily  atorvastatin 40 milliGRAM(s) Oral at bedtime  clopidogrel Tablet 75 milliGRAM(s) Oral daily  dextrose 5%. 1000 milliLiter(s) IV Continuous <Continuous>  dextrose 50% Injectable 12.5 Gram(s) IV Push once  dextrose 50% Injectable 25 Gram(s) IV Push once  dextrose 50% Injectable 25 Gram(s) IV Push once  finasteride 5 milliGRAM(s) Oral daily  furosemide   Injectable 40 milliGRAM(s) IV Push two times a day  insulin glargine Injectable (LANTUS) 9 Unit(s) SubCutaneous at bedtime  insulin lispro (HumaLOG) corrective regimen sliding scale   SubCutaneous three times a day before meals  insulin lispro (HumaLOG) corrective regimen sliding scale   SubCutaneous at bedtime  nystatin Powder 1 Application(s) Topical two times a day    PRN Inpatient Medications  dextrose 40% Gel 15 Gram(s) Oral once PRN  glucagon  Injectable 1 milliGRAM(s) IntraMuscular once PRN      REVIEW OF SYSTEMS  --------------------------------------------------------------------------------  Gen: No  fevers/chills  Skin: No rashes  Head/Eyes/Ears/Mouth: +Headache; Normal hearing; Normal vision w/o blurriness  Respiratory: Mild dyspnea, cough, wheezing, hemoptysis  CV: No chest pain, PND, orthopnea  GI: No abdominal pain, diarrhea, constipation, nausea, vomiting  : No increased frequency, dysuria, hematuria, nocturia  MSK: No joint pain/swelling; no back pain; no edema  Neuro: No dizziness/lightheadedness, weakness, seizures, numbness, tingling      All other systems were reviewed and are negative, except as noted.    VITALS/PHYSICAL EXAM  --------------------------------------------------------------------------------  T(C): 36.6 (01-17-19 @ 04:03), Max: 36.7 (01-16-19 @ 21:15)  HR: 56 (01-17-19 @ 04:03) (38 - 56)  BP: 141/83 (01-17-19 @ 04:03) (124/68 - 163/72)  RR: 18 (01-17-19 @ 04:03) (18 - 18)  SpO2: 99% (01-17-19 @ 04:03) (95% - 99%)  Wt(kg): --  Height (cm): 177.8 (01-15-19 @ 23:14)  Weight (kg): 96.4 (01-15-19 @ 23:14)  BMI (kg/m2): 30.5 (01-15-19 @ 23:14)  BSA (m2): 2.14 (01-15-19 @ 23:14)      01-16-19 @ 07:01  -  01-17-19 @ 07:00  --------------------------------------------------------  IN: 840 mL / OUT: 2315 mL / NET: -1475 mL      Physical Exam:  	Gen: NAD, well-appearing  	HEENT: PERRL, supple neck, clear oropharynx  	Pulm: CTA B/L  	CV: RRR, S1S2;  	Back: No spinal or CVA tenderness  	Abd: +BS, soft, nontender/nondistended  	: No suprapubic tenderness              Extremities: 3+ pitting edema of lower extremities.              Neuro: No focal deficits, intact gait  	Skin: Cellulitic changes of lower extremities with patch dark raised lesions.  	Vascular access: N/A    LABS/STUDIES  --------------------------------------------------------------------------------              10.0   5.16  >-----------<  200      [01-16-19 @ 08:24]              30.5     144  |  115  |  73  ----------------------------<  127      [01-17-19 @ 06:12]  4.0   |  15  |  3.54        Ca     8.9     [01-17-19 @ 06:12]      Mg     1.9     [01-16-19 @ 05:49]      Phos  5.0     [01-16-19 @ 05:49]    TPro  6.7  /  Alb  3.7  /  TBili  1.8  /  DBili  x   /  AST  42  /  ALT  43  /  AlkPhos  85  [01-15-19 @ 18:22]    PT/INR: PT 92.8 , INR 7.64       [01-16-19 @ 08:15]  PTT: 57.7       [01-16-19 @ 08:15]      Creatinine Trend:  SCr 3.54 [01-17 @ 06:12]  SCr 3.66 [01-16 @ 05:49]  SCr 3.74 [01-15 @ 18:22]    Urinalysis - [01-16-19 @ 08:36]      Color Yellow / Appearance Slightly Turbid / SG 1.011 / pH 5.5      Gluc Negative / Ketone Negative  / Bili Negative / Urobili Negative       Blood Small / Protein Negative / Leuk Est Large / Nitrite Negative      RBC 10 /  / Hyaline 0 / Gran  / Sq Epi  / Non Sq Epi 0 / Bacteria Negative    Urine Creatinine 28      [01-16-19 @ 02:23]  Urine Protein 17      [01-16-19 @ 02:23]  Urine Albumin 7.1      [01-16-19 @ 02:23]  Urine Sodium 86      [01-16-19 @ 03:47]  Urine Osmolality 290      [01-16-19 @ 08:36]    HbA1c 5.6      [01-16-19 @ 08:24]

## 2019-01-17 NOTE — PROGRESS NOTE ADULT - SUBJECTIVE AND OBJECTIVE BOX
Patient is a 78y old  Male who presents with a chief complaint of 3 months of progressive B/L LE oedema and dyspnoea on exertion. (2019 14:16)        SUBJECTIVE / OVERNIGHT EVENTS:  feels better. afebrile. edema seems to be improving.   still feels weak.     MEDICATIONS  (STANDING):  amLODIPine   Tablet 5 milliGRAM(s) Oral daily  aspirin enteric coated 81 milliGRAM(s) Oral daily  atorvastatin 40 milliGRAM(s) Oral at bedtime  clopidogrel Tablet 75 milliGRAM(s) Oral daily  dextrose 5%. 1000 milliLiter(s) (50 mL/Hr) IV Continuous <Continuous>  dextrose 50% Injectable 12.5 Gram(s) IV Push once  dextrose 50% Injectable 25 Gram(s) IV Push once  dextrose 50% Injectable 25 Gram(s) IV Push once  finasteride 5 milliGRAM(s) Oral daily  furosemide   Injectable 40 milliGRAM(s) IV Push two times a day  insulin glargine Injectable (LANTUS) 9 Unit(s) SubCutaneous at bedtime  insulin lispro (HumaLOG) corrective regimen sliding scale   SubCutaneous three times a day before meals  insulin lispro (HumaLOG) corrective regimen sliding scale   SubCutaneous at bedtime  nystatin Powder 1 Application(s) Topical two times a day    MEDICATIONS  (PRN):  dextrose 40% Gel 15 Gram(s) Oral once PRN Blood Glucose LESS THAN 70 milliGRAM(s)/deciliter  glucagon  Injectable 1 milliGRAM(s) IntraMuscular once PRN Glucose LESS THAN 70 milligrams/deciliter      Vital Signs Last 24 Hrs  T(C): 36.8 (2019 12:02), Max: 36.8 (2019 12:02)  T(F): 98.2 (2019 12:02), Max: 98.2 (2019 12:02)  HR: 59 (2019 12:02) (42 - 59)  BP: 137/71 (2019 12:02) (135/77 - 163/72)  BP(mean): --  RR: 18 (2019 12:02) (18 - 18)  SpO2: 97% (2019 12:02) (95% - 99%)  CAPILLARY BLOOD GLUCOSE      POCT Blood Glucose.: 193 mg/dL (2019 12:13)  POCT Blood Glucose.: 120 mg/dL (2019 07:51)  POCT Blood Glucose.: 204 mg/dL (2019 21:34)  POCT Blood Glucose.: 165 mg/dL (2019 17:11)    I&O's Summary    2019 07:01  -  2019 07:00  --------------------------------------------------------  IN: 840 mL / OUT: 2315 mL / NET: -1475 mL    2019 07:01  -  2019 15:20  --------------------------------------------------------  IN: 360 mL / OUT: 1000 mL / NET: -640 mL        PHYSICAL EXAM  GENERAL: NAD, well-developed  HEAD:  Atraumatic, Normocephalic  EYES: EOMI, conjunctiva and sclera clear  NECK: Supple, No JVD  CHEST/LUNG: Clear to auscultation bilaterally; No wheeze  HEART: Regular rate and rhythm; systolic murmur.   ABDOMEN: Soft, Nontender, Nondistended; Bowel sounds present, scrotal edema  EXTREMITIES: bilateral LE edema pitting 2 +. venous stasis changes  PSYCH: AAOx3  SKIN: No rashes or lesions    LABS:                        10.1   5.81  )-----------( 226      ( 2019 07:07 )             31.7         144  |  115<H>  |  73<H>  ----------------------------<  127<H>  4.0   |  15<L>  |  3.54<H>    Ca    8.9      2019 06:12  Phos  5.0       Mg     1.9         TPro  6.7  /  Alb  3.7  /  TBili  1.8<H>  /  DBili  x   /  AST  42<H>  /  ALT  43  /  AlkPhos  85  01-15    PT/INR - ( 2019 06:58 )   PT: 43.1 sec;   INR: 3.63 ratio         PTT - ( 2019 08:15 )  PTT:57.7 sec      Urinalysis Basic - ( 2019 08:36 )    Color: Yellow / Appearance: Slightly Turbid / S.011 / pH: x  Gluc: x / Ketone: Negative  / Bili: Negative / Urobili: Negative mg/dL   Blood: x / Protein: Negative mg/dL / Nitrite: Negative   Leuk Esterase: Large / RBC: 10 /HPF /  /HPF   Sq Epi: x / Non Sq Epi: 0 /HPF / Bacteria: Negative          RADIOLOGY & ADDITIONAL TESTS:    Imaging Personally Reviewed:  Consultant(s) Notes Reviewed:    Care Discussed with Consultants/Other Providers:

## 2019-01-18 DIAGNOSIS — I25.10 ATHEROSCLEROTIC HEART DISEASE OF NATIVE CORONARY ARTERY WITHOUT ANGINA PECTORIS: ICD-10-CM

## 2019-01-18 DIAGNOSIS — I35.0 NONRHEUMATIC AORTIC (VALVE) STENOSIS: ICD-10-CM

## 2019-01-18 DIAGNOSIS — I50.9 HEART FAILURE, UNSPECIFIED: ICD-10-CM

## 2019-01-18 LAB
ANION GAP SERPL CALC-SCNC: 17 MMOL/L — SIGNIFICANT CHANGE UP (ref 5–17)
BUN SERPL-MCNC: 76 MG/DL — HIGH (ref 7–23)
CALCIUM SERPL-MCNC: 9 MG/DL — SIGNIFICANT CHANGE UP (ref 8.4–10.5)
CHLORIDE SERPL-SCNC: 113 MMOL/L — HIGH (ref 96–108)
CO2 SERPL-SCNC: 14 MMOL/L — LOW (ref 22–31)
CREAT SERPL-MCNC: 3.27 MG/DL — HIGH (ref 0.5–1.3)
GLUCOSE BLDC GLUCOMTR-MCNC: 161 MG/DL — HIGH (ref 70–99)
GLUCOSE BLDC GLUCOMTR-MCNC: 181 MG/DL — HIGH (ref 70–99)
GLUCOSE BLDC GLUCOMTR-MCNC: 186 MG/DL — HIGH (ref 70–99)
GLUCOSE BLDC GLUCOMTR-MCNC: 199 MG/DL — HIGH (ref 70–99)
GLUCOSE SERPL-MCNC: 189 MG/DL — HIGH (ref 70–99)
HCT VFR BLD CALC: 33.5 % — LOW (ref 39–50)
HGB BLD-MCNC: 10.7 G/DL — LOW (ref 13–17)
INR BLD: 2.32 RATIO — HIGH (ref 0.88–1.16)
MAGNESIUM SERPL-MCNC: 2 MG/DL — SIGNIFICANT CHANGE UP (ref 1.6–2.6)
MCHC RBC-ENTMCNC: 28.6 PG — SIGNIFICANT CHANGE UP (ref 27–34)
MCHC RBC-ENTMCNC: 31.9 GM/DL — LOW (ref 32–36)
MCV RBC AUTO: 89.6 FL — SIGNIFICANT CHANGE UP (ref 80–100)
PLATELET # BLD AUTO: 249 K/UL — SIGNIFICANT CHANGE UP (ref 150–400)
POTASSIUM SERPL-MCNC: 4.2 MMOL/L — SIGNIFICANT CHANGE UP (ref 3.5–5.3)
POTASSIUM SERPL-SCNC: 4.2 MMOL/L — SIGNIFICANT CHANGE UP (ref 3.5–5.3)
PROTHROM AB SERPL-ACNC: 27.1 SEC — HIGH (ref 10–13.1)
RBC # BLD: 3.74 M/UL — LOW (ref 4.2–5.8)
RBC # FLD: 22.1 % — HIGH (ref 10.3–14.5)
SODIUM SERPL-SCNC: 144 MMOL/L — SIGNIFICANT CHANGE UP (ref 135–145)
WBC # BLD: 7.43 K/UL — SIGNIFICANT CHANGE UP (ref 3.8–10.5)
WBC # FLD AUTO: 7.43 K/UL — SIGNIFICANT CHANGE UP (ref 3.8–10.5)

## 2019-01-18 PROCEDURE — 99233 SBSQ HOSP IP/OBS HIGH 50: CPT

## 2019-01-18 PROCEDURE — 99232 SBSQ HOSP IP/OBS MODERATE 35: CPT | Mod: GC

## 2019-01-18 PROCEDURE — 99221 1ST HOSP IP/OBS SF/LOW 40: CPT

## 2019-01-18 PROCEDURE — 99233 SBSQ HOSP IP/OBS HIGH 50: CPT | Mod: GC

## 2019-01-18 RX ORDER — WARFARIN SODIUM 2.5 MG/1
2 TABLET ORAL ONCE
Qty: 0 | Refills: 0 | Status: COMPLETED | OUTPATIENT
Start: 2019-01-18 | End: 2019-01-18

## 2019-01-18 RX ORDER — FUROSEMIDE 40 MG
10 TABLET ORAL
Qty: 500 | Refills: 0 | Status: DISCONTINUED | OUTPATIENT
Start: 2019-01-18 | End: 2019-01-24

## 2019-01-18 RX ORDER — FUROSEMIDE 40 MG
80 TABLET ORAL ONCE
Qty: 0 | Refills: 0 | Status: COMPLETED | OUTPATIENT
Start: 2019-01-18 | End: 2019-01-18

## 2019-01-18 RX ADMIN — INSULIN GLARGINE 9 UNIT(S): 100 INJECTION, SOLUTION SUBCUTANEOUS at 21:36

## 2019-01-18 RX ADMIN — Medication 80 MILLIGRAM(S): at 11:49

## 2019-01-18 RX ADMIN — WARFARIN SODIUM 2 MILLIGRAM(S): 2.5 TABLET ORAL at 21:37

## 2019-01-18 RX ADMIN — Medication 40 MILLIGRAM(S): at 05:51

## 2019-01-18 RX ADMIN — Medication 5 MG/HR: at 11:51

## 2019-01-18 RX ADMIN — Medication 3 MILLILITER(S): at 05:51

## 2019-01-18 RX ADMIN — FINASTERIDE 5 MILLIGRAM(S): 5 TABLET, FILM COATED ORAL at 11:50

## 2019-01-18 RX ADMIN — Medication 3 MILLILITER(S): at 11:50

## 2019-01-18 RX ADMIN — Medication 1: at 11:50

## 2019-01-18 RX ADMIN — Medication 81 MILLIGRAM(S): at 11:50

## 2019-01-18 RX ADMIN — Medication 1: at 17:02

## 2019-01-18 RX ADMIN — Medication 3 MILLILITER(S): at 23:17

## 2019-01-18 RX ADMIN — NYSTATIN CREAM 1 APPLICATION(S): 100000 CREAM TOPICAL at 05:52

## 2019-01-18 RX ADMIN — CLOPIDOGREL BISULFATE 75 MILLIGRAM(S): 75 TABLET, FILM COATED ORAL at 11:50

## 2019-01-18 RX ADMIN — Medication 1: at 08:12

## 2019-01-18 RX ADMIN — NYSTATIN CREAM 1 APPLICATION(S): 100000 CREAM TOPICAL at 17:03

## 2019-01-18 RX ADMIN — ATORVASTATIN CALCIUM 40 MILLIGRAM(S): 80 TABLET, FILM COATED ORAL at 21:37

## 2019-01-18 RX ADMIN — AMLODIPINE BESYLATE 5 MILLIGRAM(S): 2.5 TABLET ORAL at 05:52

## 2019-01-18 RX ADMIN — Medication 3 MILLILITER(S): at 17:02

## 2019-01-18 NOTE — PHYSICAL THERAPY INITIAL EVALUATION ADULT - PERTINENT HX OF CURRENT PROBLEM, REHAB EVAL
77 y/o M admitted on 1/15/19--patient with a history of CAD with past PCI, chronic atrial fibrillation on Coumadin (patient was told by his office physician above to stop his Coumadin and go to the ER), essential HTN, unspecified cardiac valve disorder, reported unspecified hemoccult positive stools in the office, type 2 DM on metformin, with the patient self referring to [place] directed by his office physician following an elevated INR and episode of epistaxis this AM following apparently 3

## 2019-01-18 NOTE — PROGRESS NOTE ADULT - PROBLEM SELECTOR PLAN 3
renal function slowly improving but clinical picture less positive.   Will HOLD ACE and metformin and will follow Cr on Lasix as above.    Monitor strict I+Os, wg.   Renal US without obstruction.

## 2019-01-18 NOTE — PROGRESS NOTE ADULT - ATTENDING COMMENTS
78 year old man with acute decompensated heart failure, severely volume overloaded with anasarca and acute kidney injury (had normal creatinine 2 years ago). Has murmur consistent with severe AS. Echo confirms critical AS. Last night decompensated with respiratory distress and urinary retention. Need to continue aggressive diuresis. Has large volume overload. Also evauate renal issues, which are likely largely related to severely decompensated cardiac state and evaluate anemia. Contacting Structural Heart team to begin evaluation for aortic valve replacement.

## 2019-01-18 NOTE — CONSULT NOTE ADULT - PROBLEM SELECTOR RECOMMENDATION 9
- TAVR eval initiated  - pt will need CT scan, cath, carotid dopplers, pft's  - will discuss with TAVR team
Likely cardiorenal in nature however should rule out other causes as well including dehydration/hypovolemia, obstruction  - Recommend urine studies including UA, protein/creatinine ratio, lytes, osm, creatinine  - Check non-con CT A/P to assess kidneys if renal sono cannot be done tonight, however would also obtain dedicated renal sono as well  - Check BMP daily  - Avoid nephrotoxic agents including NSAIDs, RCA, ACE/ARB  - Hold lisinopril from home regimen  - Hold oral hypoglycemics including Metformin in setting of ELADIA and acidosis  - Strict I/Os

## 2019-01-18 NOTE — PHYSICAL THERAPY INITIAL EVALUATION ADULT - ADDITIONAL COMMENTS
lives in apt + elevator, + glasses all the time, hearing good, L handed / dos not use assistive device, months of progressive dyspnoea and B/L LE oedema with poor aerobic functional status with dyspnoea with changing his clothes or ambulation to the BR.   Pt with 3 months of scrotal edema      lives in apt + elevator, + glasses all the time, hearing good, L handed / dos not use assistive device,

## 2019-01-18 NOTE — CONSULT NOTE ADULT - PROBLEM SELECTOR RECOMMENDATION 2
- cont aggressive diuresis with IV lasix
Likely secondary to azotemia, lactic acid normal, sugars normal as well  - Hold metformin in setting of acidosis and ELADIA  - pH compensated currently, no need for bicarb supplementation however continue monitoring BMP daily or as needed

## 2019-01-18 NOTE — PROVIDER CONTACT NOTE (OTHER) - SITUATION
Pt with >999 on bladder scan. Pt given addition dose of 20mg Iv lasix for c/o SOB. Pt with minimal urine output. PA aware.

## 2019-01-18 NOTE — PROVIDER CONTACT NOTE (OTHER) - SITUATION
Pt with 8 beats polymorphic Vtach on tele. Pt with hx of AIVR this admission. Pt with c/o SOB at this time. PA aware.

## 2019-01-18 NOTE — PHYSICAL THERAPY INITIAL EVALUATION ADULT - ACTIVE RANGE OF MOTION EXAMINATION, REHAB EVAL
bilateral upper extremity Active ROM was WFL (within functional limits)/bilateral  lower extremity Active ROM was WFL (within functional limits)/LE; tight w/ edema LE;s

## 2019-01-18 NOTE — CONSULT NOTE ADULT - PROBLEM SELECTOR RECOMMENDATION 4
- given ELADIA on CKI will need recommendation from nephrologist about timing CT and cath due to contrast load
Currently stable however would hold Lisinopril in setting of ELADIA and Coreg in setting of bradycardia

## 2019-01-18 NOTE — PROGRESS NOTE ADULT - PROBLEM SELECTOR PLAN 1
Overnight with acute hypoxic resp failure in setting of CHF.   Will give lasix 80mg IV x1 now and start on lasix gtt @10mg/hr  D/w card and renal. agree with plan.   echo with EF 45 % with severe AS  structural heart to see for eval   Daily weights strict I+Os.   cont ASA, Plavix.

## 2019-01-18 NOTE — PROGRESS NOTE ADULT - SUBJECTIVE AND OBJECTIVE BOX
[facility] DIVISION OF KIDNEY DISEASES AND HYPERTENSION -- FOLLOW UP NOTE  --------------------------------------------------------------------------------  Chief Complaint:  ELADIA on CKD    24 hour events/subjective:  cards eval    PAST HISTORY  --------------------------------------------------------------------------------  No significant changes to PMH, PSH, FHx, SHx, unless otherwise noted    ALLERGIES & MEDICATIONS  --------------------------------------------------------------------------------  Allergies    latex (Unknown)  Sulfacetamide Sodium (Unknown)    Intolerances      Standing Inpatient Medications  ALBUTerol/ipratropium for Nebulization 3 milliLiter(s) Nebulizer every 6 hours  amLODIPine   Tablet 5 milliGRAM(s) Oral daily  aspirin enteric coated 81 milliGRAM(s) Oral daily  atorvastatin 40 milliGRAM(s) Oral at bedtime  clopidogrel Tablet 75 milliGRAM(s) Oral daily  dextrose 5%. 1000 milliLiter(s) IV Continuous <Continuous>  dextrose 50% Injectable 12.5 Gram(s) IV Push once  dextrose 50% Injectable 25 Gram(s) IV Push once  dextrose 50% Injectable 25 Gram(s) IV Push once  finasteride 5 milliGRAM(s) Oral daily  furosemide Infusion 10 mG/Hr IV Continuous <Continuous>  insulin glargine Injectable (LANTUS) 9 Unit(s) SubCutaneous at bedtime  insulin lispro (HumaLOG) corrective regimen sliding scale   SubCutaneous three times a day before meals  insulin lispro (HumaLOG) corrective regimen sliding scale   SubCutaneous at bedtime  nystatin Powder 1 Application(s) Topical two times a day  warfarin 2 milliGRAM(s) Oral once    PRN Inpatient Medications  dextrose 40% Gel 15 Gram(s) Oral once PRN  glucagon  Injectable 1 milliGRAM(s) IntraMuscular once PRN      REVIEW OF SYSTEMS  --------------------------------------------------------------------------------  unable to do    VITALS/PHYSICAL EXAM  --------------------------------------------------------------------------------  T(C): 36.4 (01-18-19 @ 11:31), Max: 36.6 (01-17-19 @ 20:49)  HR: 63 (01-18-19 @ 17:01) (55 - 85)  BP: 112/78 (01-18-19 @ 17:01) (108/65 - 159/90)  RR: 20 (01-18-19 @ 16:16) (17 - 20)  SpO2: 96% (01-18-19 @ 16:16) (94% - 99%)  Wt(kg): --        01-17-19 @ 07:01  -  01-18-19 @ 07:00  --------------------------------------------------------  IN: 840 mL / OUT: 4150 mL / NET: -3310 mL    01-18-19 @ 07:01  -  01-18-19 @ 17:11  --------------------------------------------------------  IN: 361 mL / OUT: 1000 mL / NET: -639 mL      PHYSICAL EXAM: vital signs as above  in mild distress  Neck: Supple, no JVD,    Lungs: +crackles on exam  CVS: S1 S2 no M/R/G  Abdomen: no tenderness, no organomegaly, BS present  Neuro: Grossly intact  Skin: warm, dry  Ext: 1+ edema bl        LABS/STUDIES  --------------------------------------------------------------------------------              10.7   7.43  >-----------<  249      [01-18-19 @ 07:56]              33.5     144  |  113  |  76  ----------------------------<  189      [01-18-19 @ 06:41]  4.2   |  14  |  3.27        Ca     9.0     [01-18-19 @ 06:41]      Mg     2.0     [01-18-19 @ 06:41]      Phos  4.2     [01-17-19 @ 20:25]      PT/INR: PT 27.1 , INR 2.32       [01-18-19 @ 07:56]    CK 48      [01-17-19 @ 20:25]    Creatinine Trend:  SCr 3.27 [01-18 @ 06:41]  SCr 3.31 [01-17 @ 20:25]  SCr 3.54 [01-17 @ 06:12]  SCr 3.66 [01-16 @ 05:49]  SCr 3.74 [01-15 @ 18:22]    Urinalysis - [01-16-19 @ 08:36]      Color Yellow / Appearance Slightly Turbid / SG 1.011 / pH 5.5      Gluc Negative / Ketone Negative  / Bili Negative / Urobili Negative       Blood Small / Protein Negative / Leuk Est Large / Nitrite Negative      RBC 10 /  / Hyaline 0 / Gran  / Sq Epi  / Non Sq Epi 0 / Bacteria Negative    Urine Creatinine 28      [01-16-19 @ 02:23]  Urine Protein 17      [01-16-19 @ 02:23]  Urine Albumin 7.1      [01-16-19 @ 02:23]  Urine Sodium 86      [01-16-19 @ 03:47]  Urine Osmolality 290      [01-16-19 @ 08:36]    HbA1c 5.6      [01-16-19 @ 08:24]

## 2019-01-18 NOTE — PROGRESS NOTE ADULT - PROBLEM SELECTOR PLAN 1
Likely cardiorenal in nature however should rule out other causes as well including dehydration/hypovolemia, obstruction. Kimi from renal congestion, need more diuresis  - Renal sono: R/L Kidney 12 cm with some echogenicity  - Check BMP daily  - Avoid nephrotoxic agents including NSAIDs, RCA, ACE/ARB  - Hold lisinopril from home regimen  - Hold oral hypoglycemics including Metformin in setting of KIMI and acidosis  - Strict I/Os.

## 2019-01-18 NOTE — CONSULT NOTE ADULT - SUBJECTIVE AND OBJECTIVE BOX
CC: progressive sams for 3 months.    HPI:  NIGHT HOSPITALIST:   Patient UNKNOWN to me previously, assigned to me at this point via the ER to admit this 79 y/o M--patient with a history of CAD with past PCI, chronic atrial fibrillation on Coumadin (patient was told by his office physician above to stop his Coumadin and go to the ER), essential HTN, unspecified cardiac valve disorder, reported unspecified hemoccult positive stools in the office, type 2 DM on metformin, with the patient self referring to [facility] directed by his office physician following an elevated INR and episode of epistaxis this AM following apparently 3 months of progressive dyspnoea and B/L LE oedema with poor aerobic functional status with dyspnoea with changing his clothes or ambulation to the BR.   Patient denies chest pain/pressure.  NO dyspnoea at present.  NO HA< no focal weakness.   NO abdominal pain, no red blood per rectum or melena.  NO back pain, no tearing back pain.   NO dysuria, no haematuria.   Denies weight loss or anorexia.  No rash.   Notes 3-4 months of persistent scrotal oedema.  No penile pain, no scrotal pain.  No leg pain.  Remaining review of systems not contributory. (15 Lazaro 2019 23:54)    I saw and examined Mr. Vega while he was sitting in a chair at the bedside.  He reported his history of CAD, with PCI by Dr. Harris in 2017, as well as his type II DM managed on oral hypoglycemics and his a.fib for which he was taking coumadin.  He also reported that he had been in such bad heart failure that he could not walk from his bed to the bathroom without sob and had not left his apartment in 3 months ([facility]).  He also reports having had a bloody nose that would not stop and his INR was >6 on admission.  He denies PMHx of kidney disease.  Currently he does not have any complaints and is sitting comfortably.  Denies CP.       Allergies    latex (Unknown)  Sulfacetamide Sodium (Unknown)    Intolerances      PAST MEDICAL & SURGICAL HISTORY:  CAD (coronary artery disease)  Prostatitis syndrome  UTI (urinary tract infection)  Diabetes  Stented coronary artery  High cholesterol  HTN (hypertension)  Afib  S/p bare metal coronary artery stent    MEDICATIONS  (STANDING):  ALBUTerol/ipratropium for Nebulization 3 milliLiter(s) Nebulizer every 6 hours  amLODIPine   Tablet 5 milliGRAM(s) Oral daily  aspirin enteric coated 81 milliGRAM(s) Oral daily  atorvastatin 40 milliGRAM(s) Oral at bedtime  clopidogrel Tablet 75 milliGRAM(s) Oral daily  dextrose 5%. 1000 milliLiter(s) (50 mL/Hr) IV Continuous <Continuous>  dextrose 50% Injectable 12.5 Gram(s) IV Push once  dextrose 50% Injectable 25 Gram(s) IV Push once  dextrose 50% Injectable 25 Gram(s) IV Push once  finasteride 5 milliGRAM(s) Oral daily  furosemide Infusion 10 mG/Hr (5 mL/Hr) IV Continuous <Continuous>  insulin glargine Injectable (LANTUS) 9 Unit(s) SubCutaneous at bedtime  insulin lispro (HumaLOG) corrective regimen sliding scale   SubCutaneous three times a day before meals  insulin lispro (HumaLOG) corrective regimen sliding scale   SubCutaneous at bedtime  nystatin Powder 1 Application(s) Topical two times a day  warfarin 2 milliGRAM(s) Oral once      REVIEW OF SYSTEMS:    CONSTITUTIONAL: No weakness, fevers or chills  EYES/ENT: No visual changes;  No vertigo or throat pain   NECK: No pain or stiffness  RESPIRATORY: No cough, wheezing, hemoptysis; No shortness of breath  CARDIOVASCULAR: No chest pain or palpitations  GASTROINTESTINAL: No abdominal or epigastric pain. No nausea, vomiting, or hematemesis; No diarrhea or constipation. No melena or hematochezia.  GENITOURINARY: No dysuria, frequency or hematuria  NEUROLOGICAL: No numbness or weakness  SKIN: No itching, rashes      Vital Signs Last 24 Hrs  T(C): 36.4 (18 Jan 2019 11:31), Max: 36.6 (17 Jan 2019 20:49)  T(F): 97.5 (18 Jan 2019 11:31), Max: 97.8 (17 Jan 2019 20:49)  HR: 63 (18 Jan 2019 17:01) (55 - 85)  BP: 112/78 (18 Jan 2019 17:01) (108/65 - 159/90)  BP(mean): --  RR: 20 (18 Jan 2019 16:16) (17 - 20)  SpO2: 96% (18 Jan 2019 16:16) (94% - 99%)                          10.7   7.43  )-----------( 249      ( 18 Jan 2019 07:56 )             33.5   01-18    144  |  113<H>  |  76<H>  ----------------------------<  189<H>  4.2   |  14<L>  |  3.27<H>    Ca    9.0      18 Jan 2019 06:41  Phos  4.2     01-17  Mg     2.0     01-18      PT/INR - ( 18 Jan 2019 07:56 )   PT: 27.1 sec;   INR: 2.32 ratio             I&O's Summary    17 Jan 2019 07:01  -  18 Jan 2019 07:00  --------------------------------------------------------  IN: 840 mL / OUT: 4150 mL / NET: -3310 mL    18 Jan 2019 07:01  -  18 Jan 2019 18:00  --------------------------------------------------------  IN: 361 mL / OUT: 1000 mL / NET: -639 mL          Physical Exam  General: NAD  Cardiac: s1s2, IRR, II/VI systolic murmur best heard LLSB and apex  Pulmonary: rales throught, with scattered wheezes, good effort  Gastrointestinal: soft abdomen, nontender, nondistended, + bowel sounds throughout  Extremities: + edema b/l with skin discoloration, DP pulses palpable  Neuro: A&Ox3, nonfocal    < from: Transthoracic Echocardiogram (01.16.19 @ 14:19) >  Dimensions:    Normal Values:  LA:     5.7    2.0 - 4.0 cm  Ao:     3.1    2.0 - 3.8 cm  SEPTUM: 1.3    0.6 - 1.2 cm  PWT:    1.2    0.6 -1.1 cm  LVIDd:  5.8    3.0 - 5.6 cm  LVIDs:  4.8    1.8 - 4.0 cm  Derived variables:  LVMI: 155 g/m2  RWT: 0.41  Fractional short: 17 %  EF (Visual Estimate): 45 %  Doppler Peak Velocity (m/sec): AoV=4.2  ------------------------------------------------------------------------  Observations:  Mitral Valve: Mitral annular calcification. Thickened and  tethered mitral valve leaflets. Moderate mitral  regurgitation.  Aortic Valve/Aorta: Calcified aortic valve with decreased  opening. Morphology is not well seen. Cannot rule out  bicuspid valve. Peak transaortic valve gradient equals 71  mm Hg, mean transaortic valve gradient equals 38 mm Hg,  estimated aortic valve area equals 0.8 sqcm (by continuity  equation), aortic valve velocity time integral equals 99  cm, consistent with severe aortic stenosis. Mild aortic  regurgitation.  Peak left ventricular outflow tract  gradient equals 4 mm Hg, mean gradient is equal to 2 mm Hg,  LVOT velocity time integral equals 25 cm.  Aortic Root: 3.1 cm.  LVOT diameter: 2.1 cm.  Left Atrium: Severely dilated left atrium.  LA volume index  = 57 cc/m2.  Left Ventricle: Mild global left ventricular systolic  dysfunction. Flattening of the interventricular septum in  both systole and diastole is  consistentwith right  ventricular pressure overload. Eccentric left ventricular  hypertrophy (dilated left ventricle with normal relative  wall thickness). Normal diastolic function  Right Heart: Moderate right atrial enlargement. Right  ventricular enlargement with decreased right ventricular  systolic function. Normal tricuspid valve. Moderate-severe  tricuspid regurgitation. Normal pulmonic valve. Mild  pulmonic regurgitation.  Pericardium/Pleura: Normal pericardium with no pericardial  effusion.  Hemodynamic: Estimated right ventricular systolic pressure  equals 71 mm Hg, assuming right atrial pressure equals 10  mm Hg, consistent with severe pulmonary hypertension.  ------------------------------------------------------------------------  Conclusions:  1. Mitral annular calcification. Thickened and tethered  mitral valve leaflets. Moderate mitral regurgitation.  2. Calcified aortic valve with decreased opening.  Morphology is not well seen. Cannot rule out bicuspid  valve. Peak transaortic valve gradient equals 71 mm Hg,  mean transaortic valve gradient equals 38 mm Hg, estimated  aortic valve area equals 0.8 sqcm (by continuity equation),  aortic valve velocity time integral equals 99 cm,  consistent with severe aortic stenosis. Mild aortic  regurgitation.  3. Severely dilated left atrium.  LA volume index = 57  cc/m2.  4. Eccentric left ventricular hypertrophy (dilated left  ventricle with normal relative wall thickness).  5. Mild global left ventricular systolic dysfunction.  Flattening of the interventricular septum in both systole  and diastole is  consistent with right ventricular pressure  overload.  6. Right ventricular enlargement with decreased right  ventricular systolic function.  7. Normal tricuspid valve. Moderate-severe tricuspid  regurgitation.  8. Estimated pulmonary artery systolic pressure equals 71  mm Hg, assuming right atrial pressure equals 10 mm Hg,  consistent with severe pulmonary pressures.  *** No previous Echo exam.  ------------------------------------------------------------------------  Confirmed on  1/16/2019 - 16:11:53 by Jeremy Souza M.D.  ------------------------------------------------------------------------    < end of copied text >

## 2019-01-18 NOTE — PHYSICAL THERAPY INITIAL EVALUATION ADULT - PREDICTED DURATION OF THERAPY (DAYS/WKS), PT EVAL
-- Message is from the [facility]--    Reason for Call: Patient looking to schedule a follow up appointment at the [place] location. No available schedule. Would also like for doctor to review the results being sent from     Caller Information       Type Contact Phone    12/17/2018 12:26 PM Phone (Incoming) Stewartortiz Cliffneeta (Self) [phone] (H)          Alternative phone number: n/a    Turnaround time given to caller:   \"This message will be sent to [state Provider's name]. The clinical team will fulfill your request as soon as they review your message.\"     4 weeks

## 2019-01-18 NOTE — CONSULT NOTE ADULT - PROBLEM SELECTOR RECOMMENDATION 3
- last cath/PCI was 2017  - will need another cath
Likely secondary to CHF exacerbation  - Give Lasix 80 mg IV  - Strict I/Os

## 2019-01-18 NOTE — PROGRESS NOTE ADULT - SUBJECTIVE AND OBJECTIVE BOX
Patient is a 78y old  Male who presents with a chief complaint of 3 months of progressive B/L LE oedema and dyspnoea on exertion. (18 Jan 2019 07:58)        SUBJECTIVE / OVERNIGHT EVENTS:  Overnight issues with hypoxic, urinary retention. now on HF this am .  Patient reports to feeling better.     MEDICATIONS  (STANDING):  ALBUTerol/ipratropium for Nebulization 3 milliLiter(s) Nebulizer every 6 hours  amLODIPine   Tablet 5 milliGRAM(s) Oral daily  aspirin enteric coated 81 milliGRAM(s) Oral daily  atorvastatin 40 milliGRAM(s) Oral at bedtime  clopidogrel Tablet 75 milliGRAM(s) Oral daily  dextrose 5%. 1000 milliLiter(s) (50 mL/Hr) IV Continuous <Continuous>  dextrose 50% Injectable 12.5 Gram(s) IV Push once  dextrose 50% Injectable 25 Gram(s) IV Push once  dextrose 50% Injectable 25 Gram(s) IV Push once  finasteride 5 milliGRAM(s) Oral daily  furosemide Infusion 10 mG/Hr (5 mL/Hr) IV Continuous <Continuous>  insulin glargine Injectable (LANTUS) 9 Unit(s) SubCutaneous at bedtime  insulin lispro (HumaLOG) corrective regimen sliding scale   SubCutaneous three times a day before meals  insulin lispro (HumaLOG) corrective regimen sliding scale   SubCutaneous at bedtime  nystatin Powder 1 Application(s) Topical two times a day    MEDICATIONS  (PRN):  dextrose 40% Gel 15 Gram(s) Oral once PRN Blood Glucose LESS THAN 70 milliGRAM(s)/deciliter  glucagon  Injectable 1 milliGRAM(s) IntraMuscular once PRN Glucose LESS THAN 70 milligrams/deciliter      Vital Signs Last 24 Hrs  T(C): 36.4 (18 Jan 2019 11:31), Max: 36.6 (17 Jan 2019 20:49)  T(F): 97.5 (18 Jan 2019 11:31), Max: 97.8 (17 Jan 2019 20:49)  HR: 58 (18 Jan 2019 11:31) (55 - 85)  BP: 109/61 (18 Jan 2019 11:31) (109/61 - 159/90)  BP(mean): --  RR: 18 (18 Jan 2019 11:31) (17 - 20)  SpO2: 96% (18 Jan 2019 11:31) (94% - 99%)  CAPILLARY BLOOD GLUCOSE      POCT Blood Glucose.: 181 mg/dL (18 Jan 2019 11:33)  POCT Blood Glucose.: 199 mg/dL (18 Jan 2019 07:45)  POCT Blood Glucose.: 239 mg/dL (17 Jan 2019 21:52)  POCT Blood Glucose.: 195 mg/dL (17 Jan 2019 16:35)    I&O's Summary    17 Jan 2019 07:01  -  18 Jan 2019 07:00  --------------------------------------------------------  IN: 840 mL / OUT: 4150 mL / NET: -3310 mL    18 Jan 2019 07:01  -  18 Jan 2019 13:52  --------------------------------------------------------  IN: 361 mL / OUT: 0 mL / NET: 361 mL          PHYSICAL EXAM  GENERAL: mild resp distress on HF  HEAD:  Atraumatic, Normocephalic  EYES: EOMI, conjunctiva and sclera clear  NECK: Supple, No JVD  CHEST/LUNG: crackles at bases; No wheeze  HEART: Regular rate and rhythm; + systolic murmur.   ABDOMEN: Soft, Nontender, Nondistended; Bowel sounds present, scrotal edema  EXTREMITIES: bilateral LE edema pitting 2 +. venous stasis changes  PSYCH: AAOx3  SKIN: No rashes or lesions    LABS:                        10.7   7.43  )-----------( 249      ( 18 Jan 2019 07:56 )             33.5     01-18    144  |  113<H>  |  76<H>  ----------------------------<  189<H>  4.2   |  14<L>  |  3.27<H>    Ca    9.0      18 Jan 2019 06:41  Phos  4.2     01-17  Mg     2.0     01-18      PT/INR - ( 18 Jan 2019 07:56 )   PT: 27.1 sec;   INR: 2.32 ratio           CARDIAC MARKERS ( 17 Jan 2019 20:25 )  x     / x     / 48 U/L / x     / 3.2 ng/mL            RADIOLOGY & ADDITIONAL TESTS:    Imaging Personally Reviewed:  Consultant(s) Notes Reviewed:    Care Discussed with Consultants/Other Providers:

## 2019-01-18 NOTE — PROGRESS NOTE ADULT - SUBJECTIVE AND OBJECTIVE BOX
Patient seen and examined at bedside.    Overnight Events: Worsening SOB overnight. Patient given extra IV Lasix and started on hi flow NC. This morning his breathing has improved. Denies CP, palpitations. Continues to have significant edema. Now has a Porter catheter.       REVIEW OF SYSTEMS:  Constitutional:     No fevers, chills, weight loss, weight gain  HEENT:                  No dry eyes, nasal congestion, postnasal drip  CV:                         No chest pain, palpitations, orthopnea, PND  Resp:                     No cough, SOB, dyspnea, wheezing, sputum  GI:                          No nausea, vomiting, abdominal pain, diarrhea, constipation  :                        No dysuria, nocturia, hematuria, increased urinary frequency  Musculoskeletal: No back pain, myalgias, arthralgias   Skin:                       No rash, pruritus, ecchymoses  Neurological:        No headache, dizziness, syncope, weakness, numbness  Psychiatric:           No anxiety, depression   Endocrine:            No hot/cold intolerance, polydipsia  Heme/Lymph:      No bleeding, easy bruising  Allergic/Immune: No itchy eyes, rhinorrhea, hives angioedema      Current Meds:  ALBUTerol/ipratropium for Nebulization 3 milliLiter(s) Nebulizer every 6 hours  amLODIPine   Tablet 5 milliGRAM(s) Oral daily  aspirin enteric coated 81 milliGRAM(s) Oral daily  atorvastatin 40 milliGRAM(s) Oral at bedtime  clopidogrel Tablet 75 milliGRAM(s) Oral daily  dextrose 40% Gel 15 Gram(s) Oral once PRN  dextrose 5%. 1000 milliLiter(s) IV Continuous <Continuous>  dextrose 50% Injectable 12.5 Gram(s) IV Push once  dextrose 50% Injectable 25 Gram(s) IV Push once  dextrose 50% Injectable 25 Gram(s) IV Push once  finasteride 5 milliGRAM(s) Oral daily  furosemide   Injectable 40 milliGRAM(s) IV Push two times a day  glucagon  Injectable 1 milliGRAM(s) IntraMuscular once PRN  insulin glargine Injectable (LANTUS) 9 Unit(s) SubCutaneous at bedtime  insulin lispro (HumaLOG) corrective regimen sliding scale   SubCutaneous three times a day before meals  insulin lispro (HumaLOG) corrective regimen sliding scale   SubCutaneous at bedtime  nystatin Powder 1 Application(s) Topical two times a day      PAST MEDICAL & SURGICAL HISTORY:  CAD (coronary artery disease)  Prostatitis syndrome  UTI (urinary tract infection)  Diabetes  Stented coronary artery  High cholesterol  HTN (hypertension)  Afib  S/p bare metal coronary artery stent      Vitals:  T(F): 97.6 (01-18), Max: 98.2 (01-17)  HR: 69 (01-18) (55 - 85)  BP: 119/79 (01-18) (119/79 - 159/90)  RR: 17 (01-18)  SpO2: 99% (01-18)  I&O's Summary    17 Jan 2019 07:01  -  18 Jan 2019 07:00  --------------------------------------------------------  IN: 840 mL / OUT: 4150 mL / NET: -3310 mL        Physical Exam:  Appearance: no acute distress  HEENT:   Normal oral mucosa, PERRL, EOMI	  Lymphatic: No lymphadenopathy  Cardiovascular: bradycardic, irregularly irregular, Normal S1 S2, 2/6 systolic murmur, anasarca - 3+ pitting edema of legs, thighs, abdomen, arms  Respiratory: crackles R>L	  Psychiatry: A & O x 3, Mood & affect appropriate  Gastrointestinal:  Soft, Non-tender, + BS	  Skin: No rashes, No ecchymoses, No cyanosis	  Neurologic: Non-focal  Extremities: Normal range of motion, No clubbing, cyanosis or edema  Vascular: Peripheral pulses palpable 2+ bilaterally                          12.4   9.1   )-----------( 243      ( 17 Jan 2019 20:25 )             37.3     01-18    144  |  113<H>  |  76<H>  ----------------------------<  189<H>  4.2   |  14<L>  |  3.27<H>    Ca    9.0      18 Jan 2019 06:41  Phos  4.2     01-17  Mg     2.0     01-18      PT/INR - ( 17 Jan 2019 06:58 )   PT: 43.1 sec;   INR: 3.63 ratio         PTT - ( 16 Jan 2019 08:15 )  PTT:57.7 sec  CARDIAC MARKERS ( 17 Jan 2019 20:25 )  x     / x     / 48 U/L / x     / 3.2 ng/mL      Serum Pro-Brain Natriuretic Peptide: 31090 pg/mL (01-16 @ 05:49)  Serum Pro-Brain Natriuretic Peptide: 70514 pg/mL (01-15 @ 18:22)      Echo:  < from: Transthoracic Echocardiogram (01.16.19 @ 14:19) >  Dimensions:    Normal Values:  LA:     5.7    2.0 - 4.0 cm  Ao:     3.1    2.0 - 3.8 cm  SEPTUM: 1.3    0.6 - 1.2 cm  PWT:    1.2    0.6 -1.1 cm  LVIDd:  5.8    3.0 - 5.6 cm  LVIDs:  4.8    1.8 - 4.0 cm  Derived variables:  LVMI: 155 g/m2  RWT: 0.41  Fractional short: 17 %  EF (Visual Estimate): 45 %  Doppler Peak Velocity (m/sec): AoV=4.2  ------------------------------------------------------------------------  Conclusions:  1. Mitral annular calcification. Thickened and tethered  mitral valve leaflets. Moderate mitral regurgitation.  2. Calcified aortic valve with decreased opening.  Morphology is not well seen. Cannot rule out bicuspid  valve. Peak transaortic valve gradient equals 71 mm Hg,  mean transaortic valve gradient equals 38 mm Hg, estimated  aortic valve area equals 0.8 sqcm (by continuity equation),  aortic valve velocity time integral equals 99 cm,  consistent with severe aortic stenosis. Mild aortic  regurgitation.  3. Severely dilated left atrium.  LA volume index = 57  cc/m2.  4. Eccentric left ventricular hypertrophy (dilated left  ventricle with normal relative wall thickness).  5. Mild global left ventricular systolic dysfunction.  Flattening of the interventricular septum in both systole  and diastole is  consistent with right ventricular pressure  overload.  6. Right ventricular enlargement with decreased right  ventricular systolic function.  7. Normal tricuspid valve. Moderate-severe tricuspid  regurgitation.  8. Estimated pulmonary artery systolic pressure equals 71  mm Hg, assuming right atrial pressure equals 10 mm Hg,  consistent with severe pulmonary pressures.  *** No previous Echo exam.    < end of copied text >      Interpretation of Telemetry: AFib 60-80s

## 2019-01-18 NOTE — PROGRESS NOTE ADULT - PROBLEM SELECTOR PLAN 3
Likely secondary to CHF exacerbation  - Patient with L and R sided heart failure with severe AS by valve size and dilated LA along with severe pHTN likely secondary to heart failure. Will likely require intervention for valve and continued diuresis to help overall functional status and for resolution of kidney function  -increase lasix to 80mg IV TID and may need lasix drip if ok with cards as tight AS as well. May need dobutamine to help as well given biV failure  - Strict I/Os.  - agree with TAVR eval

## 2019-01-18 NOTE — PHYSICAL THERAPY INITIAL EVALUATION ADULT - GENERAL OBSERVATIONS, REHAB EVAL
Rec'd reclined in chair, on high flow nasal cannula, + morrison, + IV lock,  B/L LE edema, NAD< agreeable to PT

## 2019-01-18 NOTE — PHYSICAL THERAPY INITIAL EVALUATION ADULT - CRITERIA FOR SKILLED THERAPEUTIC INTERVENTIONS
subacute rehab if pt goes home home w/ home PT for progressive ambulation training, transfers, bed mobs, and safety assessment and will assess for assistive device/impairments found/anticipated discharge recommendation

## 2019-01-19 DIAGNOSIS — E87.0 HYPEROSMOLALITY AND HYPERNATREMIA: ICD-10-CM

## 2019-01-19 LAB
ANION GAP SERPL CALC-SCNC: 17 MMOL/L — SIGNIFICANT CHANGE UP (ref 5–17)
BUN SERPL-MCNC: 78 MG/DL — HIGH (ref 7–23)
CALCIUM SERPL-MCNC: 9.1 MG/DL — SIGNIFICANT CHANGE UP (ref 8.4–10.5)
CHLORIDE SERPL-SCNC: 114 MMOL/L — HIGH (ref 96–108)
CO2 SERPL-SCNC: 16 MMOL/L — LOW (ref 22–31)
CREAT SERPL-MCNC: 3.24 MG/DL — HIGH (ref 0.5–1.3)
GLUCOSE BLDC GLUCOMTR-MCNC: 128 MG/DL — HIGH (ref 70–99)
GLUCOSE BLDC GLUCOMTR-MCNC: 131 MG/DL — HIGH (ref 70–99)
GLUCOSE BLDC GLUCOMTR-MCNC: 156 MG/DL — HIGH (ref 70–99)
GLUCOSE BLDC GLUCOMTR-MCNC: 163 MG/DL — HIGH (ref 70–99)
GLUCOSE SERPL-MCNC: 136 MG/DL — HIGH (ref 70–99)
HCT VFR BLD CALC: 28.3 % — LOW (ref 39–50)
HGB BLD-MCNC: 9.3 G/DL — LOW (ref 13–17)
INR BLD: 2.72 RATIO — HIGH (ref 0.88–1.16)
MCHC RBC-ENTMCNC: 29.2 PG — SIGNIFICANT CHANGE UP (ref 27–34)
MCHC RBC-ENTMCNC: 32.9 GM/DL — SIGNIFICANT CHANGE UP (ref 32–36)
MCV RBC AUTO: 89 FL — SIGNIFICANT CHANGE UP (ref 80–100)
PLATELET # BLD AUTO: 206 K/UL — SIGNIFICANT CHANGE UP (ref 150–400)
POTASSIUM SERPL-MCNC: 3.3 MMOL/L — LOW (ref 3.5–5.3)
POTASSIUM SERPL-SCNC: 3.3 MMOL/L — LOW (ref 3.5–5.3)
PROTHROM AB SERPL-ACNC: 32 SEC — HIGH (ref 10–13.1)
RBC # BLD: 3.18 M/UL — LOW (ref 4.2–5.8)
RBC # FLD: 22.3 % — HIGH (ref 10.3–14.5)
SODIUM SERPL-SCNC: 147 MMOL/L — HIGH (ref 135–145)
WBC # BLD: 8.65 K/UL — SIGNIFICANT CHANGE UP (ref 3.8–10.5)
WBC # FLD AUTO: 8.65 K/UL — SIGNIFICANT CHANGE UP (ref 3.8–10.5)

## 2019-01-19 PROCEDURE — 99232 SBSQ HOSP IP/OBS MODERATE 35: CPT

## 2019-01-19 PROCEDURE — 99233 SBSQ HOSP IP/OBS HIGH 50: CPT

## 2019-01-19 RX ORDER — POTASSIUM CHLORIDE 20 MEQ
40 PACKET (EA) ORAL
Qty: 0 | Refills: 0 | Status: DISCONTINUED | OUTPATIENT
Start: 2019-01-19 | End: 2019-01-19

## 2019-01-19 RX ORDER — ACETAMINOPHEN 500 MG
650 TABLET ORAL ONCE
Qty: 0 | Refills: 0 | Status: COMPLETED | OUTPATIENT
Start: 2019-01-19 | End: 2019-01-19

## 2019-01-19 RX ORDER — MAGNESIUM OXIDE 400 MG ORAL TABLET 241.3 MG
400 TABLET ORAL ONCE
Qty: 0 | Refills: 0 | Status: COMPLETED | OUTPATIENT
Start: 2019-01-19 | End: 2019-01-19

## 2019-01-19 RX ORDER — WARFARIN SODIUM 2.5 MG/1
2 TABLET ORAL ONCE
Qty: 0 | Refills: 0 | Status: COMPLETED | OUTPATIENT
Start: 2019-01-19 | End: 2019-01-19

## 2019-01-19 RX ORDER — POTASSIUM CHLORIDE 20 MEQ
40 PACKET (EA) ORAL ONCE
Qty: 0 | Refills: 0 | Status: COMPLETED | OUTPATIENT
Start: 2019-01-19 | End: 2019-01-19

## 2019-01-19 RX ORDER — POTASSIUM CHLORIDE 20 MEQ
40 PACKET (EA) ORAL EVERY 4 HOURS
Qty: 0 | Refills: 0 | Status: COMPLETED | OUTPATIENT
Start: 2019-01-19 | End: 2019-01-20

## 2019-01-19 RX ADMIN — Medication 40 MILLIEQUIVALENT(S): at 14:58

## 2019-01-19 RX ADMIN — Medication 40 MILLIEQUIVALENT(S): at 22:46

## 2019-01-19 RX ADMIN — NYSTATIN CREAM 1 APPLICATION(S): 100000 CREAM TOPICAL at 17:28

## 2019-01-19 RX ADMIN — MAGNESIUM OXIDE 400 MG ORAL TABLET 400 MILLIGRAM(S): 241.3 TABLET ORAL at 22:07

## 2019-01-19 RX ADMIN — ATORVASTATIN CALCIUM 40 MILLIGRAM(S): 80 TABLET, FILM COATED ORAL at 22:07

## 2019-01-19 RX ADMIN — Medication 3 MILLILITER(S): at 23:19

## 2019-01-19 RX ADMIN — INSULIN GLARGINE 9 UNIT(S): 100 INJECTION, SOLUTION SUBCUTANEOUS at 22:07

## 2019-01-19 RX ADMIN — Medication 1: at 12:05

## 2019-01-19 RX ADMIN — Medication 3 MILLILITER(S): at 17:28

## 2019-01-19 RX ADMIN — Medication 650 MILLIGRAM(S): at 14:30

## 2019-01-19 RX ADMIN — Medication 3 MILLILITER(S): at 12:53

## 2019-01-19 RX ADMIN — CLOPIDOGREL BISULFATE 75 MILLIGRAM(S): 75 TABLET, FILM COATED ORAL at 12:53

## 2019-01-19 RX ADMIN — Medication 81 MILLIGRAM(S): at 12:53

## 2019-01-19 RX ADMIN — Medication 3 MILLILITER(S): at 05:26

## 2019-01-19 RX ADMIN — AMLODIPINE BESYLATE 5 MILLIGRAM(S): 2.5 TABLET ORAL at 05:26

## 2019-01-19 RX ADMIN — Medication 5 MG/HR: at 19:45

## 2019-01-19 RX ADMIN — NYSTATIN CREAM 1 APPLICATION(S): 100000 CREAM TOPICAL at 05:26

## 2019-01-19 RX ADMIN — Medication 5 MG/HR: at 12:53

## 2019-01-19 RX ADMIN — WARFARIN SODIUM 2 MILLIGRAM(S): 2.5 TABLET ORAL at 22:07

## 2019-01-19 RX ADMIN — Medication 650 MILLIGRAM(S): at 13:57

## 2019-01-19 RX ADMIN — FINASTERIDE 5 MILLIGRAM(S): 5 TABLET, FILM COATED ORAL at 12:53

## 2019-01-19 NOTE — PROGRESS NOTE ADULT - SUBJECTIVE AND OBJECTIVE BOX
24H hour events:     MEDICATIONS:  amLODIPine   Tablet 5 milliGRAM(s) Oral daily  aspirin enteric coated 81 milliGRAM(s) Oral daily  clopidogrel Tablet 75 milliGRAM(s) Oral daily  furosemide Infusion 10 mG/Hr IV Continuous <Continuous>      ALBUTerol/ipratropium for Nebulization 3 milliLiter(s) Nebulizer every 6 hours        atorvastatin 40 milliGRAM(s) Oral at bedtime  dextrose 40% Gel 15 Gram(s) Oral once PRN  dextrose 50% Injectable 12.5 Gram(s) IV Push once  dextrose 50% Injectable 25 Gram(s) IV Push once  dextrose 50% Injectable 25 Gram(s) IV Push once  finasteride 5 milliGRAM(s) Oral daily  glucagon  Injectable 1 milliGRAM(s) IntraMuscular once PRN  insulin glargine Injectable (LANTUS) 9 Unit(s) SubCutaneous at bedtime  insulin lispro (HumaLOG) corrective regimen sliding scale   SubCutaneous three times a day before meals  insulin lispro (HumaLOG) corrective regimen sliding scale   SubCutaneous at bedtime    dextrose 5%. 1000 milliLiter(s) IV Continuous <Continuous>  nystatin Powder 1 Application(s) Topical two times a day      REVIEW OF SYSTEMS:  General: no fatigue/malaise, weight loss/gain.  Skin: no rashes.  Ophthalmologic: no blurred vision, no loss of vision. 	  ENT: no sore throat, rhinorrhea, sinus congestion.  Respiratory: no SOB, cough or wheeze.  Gastrointestinal:  no N/V/D, no melena/hematemesis/hematochezia.  Genitourinary: no dysuria/hesitancy or hematuria.  Musculoskeletal: no myalgias or arthralgias.  Neurological: no changes in vision or hearing, no lightheadedness/dizziness, no syncope/near syncope	  Psychiatric: no unusual stress/anxiety.   Hematology/Lymphatics: no unusual bleeding, bruising and no lymphadenopathy.  Endocrine: no unusual thirst.   All others negative except as stated above and in HPI.    PHYSICAL EXAM:  T(C): 36.4 (01-19-19 @ 04:47), Max: 36.6 (01-18-19 @ 20:51)  HR: 65 (01-19-19 @ 06:31) (58 - 66)  BP: 109/74 (01-19-19 @ 04:47) (108/65 - 112/78)  RR: 20 (01-19-19 @ 06:31) (18 - 20)  SpO2: 100% (01-19-19 @ 06:31) (96% - 100%)  Wt(kg): --  I&O's Summary    18 Jan 2019 07:01  -  19 Jan 2019 07:00  --------------------------------------------------------  IN: 661 mL / OUT: 3000 mL / NET: -2339 mL        Appearance: Normal	  HEENT:   Normal oral mucosa  Cardiovascular: normal rate, regular rhythm, audible S1 and S2, no murmurs, rubs, or gallops, no JVD, no edema  Respiratory: Lungs clear to auscultation	  Psychiatry: Mood & affect appropriate  Gastrointestinal:  Soft  Skin: No rashes, No ecchymoses, No cyanosis	  Neurologic: Non-focal  Extremities: No clubbing, cyanosis or edema  Vascular: Peripheral pulses palpable         LABS:	 	    CBC Full  -  ( 18 Jan 2019 07:56 )  WBC Count : 7.43 K/uL  Hemoglobin : 10.7 g/dL  Hematocrit : 33.5 %  Platelet Count - Automated : 249 K/uL  Mean Cell Volume : 89.6 fl  Mean Cell Hemoglobin : 28.6 pg  Mean Cell Hemoglobin Concentration : 31.9 gm/dL  Auto Neutrophil # : x  Auto Lymphocyte # : x  Auto Monocyte # : x  Auto Eosinophil # : x  Auto Basophil # : x  Auto Neutrophil % : x  Auto Lymphocyte % : x  Auto Monocyte % : x  Auto Eosinophil % : x  Auto Basophil % : x    01-19    147<H>  |  114<H>  |  78<H>  ----------------------------<  136<H>  3.3<L>   |  16<L>  |  3.24<H>  01-18    144  |  113<H>  |  76<H>  ----------------------------<  189<H>  4.2   |  14<L>  |  3.27<H>    Ca    9.1      19 Jan 2019 06:56  Ca    9.0      18 Jan 2019 06:41  Phos  4.2     01-17  Mg     2.0     01-18  Mg     1.9     01-17        proBNP: Serum Pro-Brain Natriuretic Peptide: 40839 pg/mL (01-16-19 @ 05:49)  Serum Pro-Brain Natriuretic Peptide: 12020 pg/mL (01-15-19 @ 18:22)      TELEMETRY: 	    ECG:  	  RADIOLOGY:  OTHER: 	    	  ASSESSMENT/PLAN: 78M with PMH of CAD s/p DAYANA to mLAD and POBA to D2 5/2017 with Dr. Harris, AFib on warfarin, T2DM, HLD, HTN who presented from home with worsening PEREZ and LE edema x3 months. TTE revealed EF 45%, severe AS, mild global LV dysfunction and severe pHTN. He is being diuresed with IV Lasix, but continues to have anasarca. Cr 3.7 on admission, now trending down. Nephrology following for ELADIA.     1) Severe AS - evaluation per Structural Heart Team     2) Acute on chronic HFrEF  - Continue diuresis with Lasix, Cr is improving  - Patient needs to be diuresed more before proceeding with further w/u       3) CAD s/p DAYANA  - Continue DAPT, statin	    4) AFib  - CHADS2-VASc - 6  - Continue warfarin, INR goal 2-3  	        Samuel Ramirez  Cardiology Fellow  Consult Fellow carries in-house phone [phone]) from 7:30 am - 5:00 pm M - F  For non-urgent issues outside of the above time period, please feel free to contact me directly by text or call at [phone] 24H hour events: No acute events overnight.     MEDICATIONS:  amLODIPine   Tablet 5 milliGRAM(s) Oral daily  aspirin enteric coated 81 milliGRAM(s) Oral daily  clopidogrel Tablet 75 milliGRAM(s) Oral daily  furosemide Infusion 10 mG/Hr IV Continuous <Continuous>      ALBUTerol/ipratropium for Nebulization 3 milliLiter(s) Nebulizer every 6 hours        atorvastatin 40 milliGRAM(s) Oral at bedtime  dextrose 40% Gel 15 Gram(s) Oral once PRN  dextrose 50% Injectable 12.5 Gram(s) IV Push once  dextrose 50% Injectable 25 Gram(s) IV Push once  dextrose 50% Injectable 25 Gram(s) IV Push once  finasteride 5 milliGRAM(s) Oral daily  glucagon  Injectable 1 milliGRAM(s) IntraMuscular once PRN  insulin glargine Injectable (LANTUS) 9 Unit(s) SubCutaneous at bedtime  insulin lispro (HumaLOG) corrective regimen sliding scale   SubCutaneous three times a day before meals  insulin lispro (HumaLOG) corrective regimen sliding scale   SubCutaneous at bedtime    dextrose 5%. 1000 milliLiter(s) IV Continuous <Continuous>  nystatin Powder 1 Application(s) Topical two times a day        PHYSICAL EXAM:  T(C): 36.4 (01-19-19 @ 04:47), Max: 36.6 (01-18-19 @ 20:51)  HR: 65 (01-19-19 @ 06:31) (58 - 66)  BP: 109/74 (01-19-19 @ 04:47) (108/65 - 112/78)  RR: 20 (01-19-19 @ 06:31) (18 - 20)  SpO2: 100% (01-19-19 @ 06:31) (96% - 100%)  Wt(kg): --  I&O's Summary    18 Jan 2019 07:01  -  19 Jan 2019 07:00  --------------------------------------------------------  IN: 661 mL / OUT: 3000 mL / NET: -2339 mL        Appearance: Normal	  HEENT:   Normal oral mucosa  Cardiovascular: normal rate, regular rhythm, audible S1 and S2, JVP moderately elevatd   Respiratory: Lungs clear to auscultation	  Psychiatry: Mood & affect appropriate  Gastrointestinal:  Soft  Skin: No rashes, No cyanosis	  Neurologic: Non-focal  Extremities: 2+ LE pitting edema  Vascular: Peripheral pulses palpable         LABS:	 	    CBC Full  -  ( 18 Jan 2019 07:56 )  WBC Count : 7.43 K/uL  Hemoglobin : 10.7 g/dL  Hematocrit : 33.5 %  Platelet Count - Automated : 249 K/uL  Mean Cell Volume : 89.6 fl  Mean Cell Hemoglobin : 28.6 pg  Mean Cell Hemoglobin Concentration : 31.9 gm/dL  Auto Neutrophil # : x  Auto Lymphocyte # : x  Auto Monocyte # : x  Auto Eosinophil # : x  Auto Basophil # : x  Auto Neutrophil % : x  Auto Lymphocyte % : x  Auto Monocyte % : x  Auto Eosinophil % : x  Auto Basophil % : x    01-19    147<H>  |  114<H>  |  78<H>  ----------------------------<  136<H>  3.3<L>   |  16<L>  |  3.24<H>  01-18    144  |  113<H>  |  76<H>  ----------------------------<  189<H>  4.2   |  14<L>  |  3.27<H>    Ca    9.1      19 Jan 2019 06:56  Ca    9.0      18 Jan 2019 06:41  Phos  4.2     01-17  Mg     2.0     01-18  Mg     1.9     01-17        proBNP: Serum Pro-Brain Natriuretic Peptide: 63794 pg/mL (01-16-19 @ 05:49)  Serum Pro-Brain Natriuretic Peptide: 87582 pg/mL (01-15-19 @ 18:22)      TELEMETRY: A-Fib in 40s - 50s, PVCs 	        	  ASSESSMENT/PLAN: 78M with PMH of CAD s/p DAYANA to mLAD and POBA to D2 5/2017 with Dr. Harris, Lisbet on warfarin, T2DM, HLD, HTN who presented from home with worsening PEREZ and LE edema x3 months. TTE revealed EF 45%, severe AS, mild global LV dysfunction and severe pHTN. He is being diuresed with IV Lasix, but continues to have anasarca. Cr 3.7 on admission, now trending down. Nephrology following for ELADIA.     1) Severe AS - evaluation per Structural Heart Team     2) Acute on chronic HFrEF  - Continue diuresis with Lasix, Creatinine is stable  - Patient needs to be diuresed more before proceeding with further evaluation of AS        3) CAD s/p DAYANA  - Continue DAPT, statin	    4) AFib  - CHADS2-VASc - 6  - Continue warfarin, INR goal 2-3  	        Samuel Ramirez  Cardiology Fellow  Consult Fellow carries in-house phone [phone]) from 7:30 am - 5:00 pm M - F  For non-urgent issues outside of the above time period, please feel free to contact me directly by text or call at [phone] 24H hour events: No acute events overnight.     MEDICATIONS:  amLODIPine   Tablet 5 milliGRAM(s) Oral daily  aspirin enteric coated 81 milliGRAM(s) Oral daily  clopidogrel Tablet 75 milliGRAM(s) Oral daily  furosemide Infusion 10 mG/Hr IV Continuous <Continuous>      ALBUTerol/ipratropium for Nebulization 3 milliLiter(s) Nebulizer every 6 hours        atorvastatin 40 milliGRAM(s) Oral at bedtime  dextrose 40% Gel 15 Gram(s) Oral once PRN  dextrose 50% Injectable 12.5 Gram(s) IV Push once  dextrose 50% Injectable 25 Gram(s) IV Push once  dextrose 50% Injectable 25 Gram(s) IV Push once  finasteride 5 milliGRAM(s) Oral daily  glucagon  Injectable 1 milliGRAM(s) IntraMuscular once PRN  insulin glargine Injectable (LANTUS) 9 Unit(s) SubCutaneous at bedtime  insulin lispro (HumaLOG) corrective regimen sliding scale   SubCutaneous three times a day before meals  insulin lispro (HumaLOG) corrective regimen sliding scale   SubCutaneous at bedtime    dextrose 5%. 1000 milliLiter(s) IV Continuous <Continuous>  nystatin Powder 1 Application(s) Topical two times a day        PHYSICAL EXAM:  T(C): 36.4 (01-19-19 @ 04:47), Max: 36.6 (01-18-19 @ 20:51)  HR: 65 (01-19-19 @ 06:31) (58 - 66)  BP: 109/74 (01-19-19 @ 04:47) (108/65 - 112/78)  RR: 20 (01-19-19 @ 06:31) (18 - 20)  SpO2: 100% (01-19-19 @ 06:31) (96% - 100%)  Wt(kg): --  I&O's Summary    18 Jan 2019 07:01  -  19 Jan 2019 07:00  --------------------------------------------------------  IN: 661 mL / OUT: 3000 mL / NET: -2339 mL        Appearance: Normal	  HEENT:   Normal oral mucosa  Cardiovascular: irregularly irregular, audible S1 and S2, JVP moderately elevatd   Respiratory: Lungs clear to auscultation	  Psychiatry: Mood & affect appropriate  Gastrointestinal:  Soft  Skin: No rashes, No cyanosis	  Neurologic: Non-focal  Extremities: 2+ LE pitting edema  Vascular: Peripheral pulses palpable         LABS:	 	    CBC Full  -  ( 18 Jan 2019 07:56 )  WBC Count : 7.43 K/uL  Hemoglobin : 10.7 g/dL  Hematocrit : 33.5 %  Platelet Count - Automated : 249 K/uL  Mean Cell Volume : 89.6 fl  Mean Cell Hemoglobin : 28.6 pg  Mean Cell Hemoglobin Concentration : 31.9 gm/dL  Auto Neutrophil # : x  Auto Lymphocyte # : x  Auto Monocyte # : x  Auto Eosinophil # : x  Auto Basophil # : x  Auto Neutrophil % : x  Auto Lymphocyte % : x  Auto Monocyte % : x  Auto Eosinophil % : x  Auto Basophil % : x    01-19    147<H>  |  114<H>  |  78<H>  ----------------------------<  136<H>  3.3<L>   |  16<L>  |  3.24<H>  01-18    144  |  113<H>  |  76<H>  ----------------------------<  189<H>  4.2   |  14<L>  |  3.27<H>    Ca    9.1      19 Jan 2019 06:56  Ca    9.0      18 Jan 2019 06:41  Phos  4.2     01-17  Mg     2.0     01-18  Mg     1.9     01-17        proBNP: Serum Pro-Brain Natriuretic Peptide: 10547 pg/mL (01-16-19 @ 05:49)  Serum Pro-Brain Natriuretic Peptide: 63670 pg/mL (01-15-19 @ 18:22)      TELEMETRY: A-Fib in 40s - 50s, PVCs 	        	  ASSESSMENT/PLAN: 78M with PMH of CAD s/p DAYANA to mLAD and POBA to D2 5/2017 with Dr. Harris, AFib on warfarin, T2DM, HLD, HTN who presented from home with worsening PEREZ and LE edema x3 months. TTE revealed EF 45%, severe AS, mild global LV dysfunction and severe pHTN. He is being diuresed with IV Lasix, but continues to have anasarca. Cr 3.7 on admission, now trending down. Nephrology following for ELADIA.     1) Severe AS - evaluation per Structural Heart Team     2) Acute on chronic HFrEF  - Continue diuresis with Lasix, Creatinine is stable  - Patient needs to be diuresed more before proceeding with further evaluation of AS        3) CAD s/p DAYANA  - Continue DAPT, statin	    4) AFib  - CHADS2-VASc - 6  - Continue warfarin, INR goal 2-3  	        Samuel Ramirez  Cardiology Fellow  Consult Fellow carries in-house phone [phone]) from 7:30 am - 5:00 pm M - F  For non-urgent issues outside of the above time period, please feel free to contact me directly by text or call at [phone] 24H hour events: No acute events overnight.   Still wtih leg swelling and dypnea.      MEDICATIONS:  amLODIPine   Tablet 5 milliGRAM(s) Oral daily  aspirin enteric coated 81 milliGRAM(s) Oral daily  clopidogrel Tablet 75 milliGRAM(s) Oral daily  furosemide Infusion 10 mG/Hr IV Continuous <Continuous>      ALBUTerol/ipratropium for Nebulization 3 milliLiter(s) Nebulizer every 6 hours        atorvastatin 40 milliGRAM(s) Oral at bedtime  dextrose 40% Gel 15 Gram(s) Oral once PRN  dextrose 50% Injectable 12.5 Gram(s) IV Push once  dextrose 50% Injectable 25 Gram(s) IV Push once  dextrose 50% Injectable 25 Gram(s) IV Push once  finasteride 5 milliGRAM(s) Oral daily  glucagon  Injectable 1 milliGRAM(s) IntraMuscular once PRN  insulin glargine Injectable (LANTUS) 9 Unit(s) SubCutaneous at bedtime  insulin lispro (HumaLOG) corrective regimen sliding scale   SubCutaneous three times a day before meals  insulin lispro (HumaLOG) corrective regimen sliding scale   SubCutaneous at bedtime    dextrose 5%. 1000 milliLiter(s) IV Continuous <Continuous>  nystatin Powder 1 Application(s) Topical two times a day        PHYSICAL EXAM:  T(C): 36.4 (01-19-19 @ 04:47), Max: 36.6 (01-18-19 @ 20:51)  HR: 65 (01-19-19 @ 06:31) (58 - 66)  BP: 109/74 (01-19-19 @ 04:47) (108/65 - 112/78)  RR: 20 (01-19-19 @ 06:31) (18 - 20)  SpO2: 100% (01-19-19 @ 06:31) (96% - 100%)  Wt(kg): --  I&O's Summary    18 Jan 2019 07:01  -  19 Jan 2019 07:00  --------------------------------------------------------  IN: 661 mL / OUT: 3000 mL / NET: -2339 mL        Appearance: Normal	  HEENT:   Normal oral mucosa  Cardiovascular: irregularly irregular, audible S1 and S2, JVP moderately elevatd.  MICHELLE  Respiratory: Lungs clear to auscultation	  Psychiatry: Mood & affect appropriate  Gastrointestinal:  Soft  Skin: No rashes, No cyanosis	  Neurologic: Non-focal  Extremities: 2+ LE pitting edema  Vascular: Peripheral pulses palpable         LABS:	 	    CBC Full  -  ( 18 Jan 2019 07:56 )  WBC Count : 7.43 K/uL  Hemoglobin : 10.7 g/dL  Hematocrit : 33.5 %  Platelet Count - Automated : 249 K/uL  Mean Cell Volume : 89.6 fl  Mean Cell Hemoglobin : 28.6 pg  Mean Cell Hemoglobin Concentration : 31.9 gm/dL  Auto Neutrophil # : x  Auto Lymphocyte # : x  Auto Monocyte # : x  Auto Eosinophil # : x  Auto Basophil # : x  Auto Neutrophil % : x  Auto Lymphocyte % : x  Auto Monocyte % : x  Auto Eosinophil % : x  Auto Basophil % : x    01-19    147<H>  |  114<H>  |  78<H>  ----------------------------<  136<H>  3.3<L>   |  16<L>  |  3.24<H>  01-18    144  |  113<H>  |  76<H>  ----------------------------<  189<H>  4.2   |  14<L>  |  3.27<H>    Ca    9.1      19 Jan 2019 06:56  Ca    9.0      18 Jan 2019 06:41  Phos  4.2     01-17  Mg     2.0     01-18  Mg     1.9     01-17        proBNP: Serum Pro-Brain Natriuretic Peptide: 51010 pg/mL (01-16-19 @ 05:49)  Serum Pro-Brain Natriuretic Peptide: 41776 pg/mL (01-15-19 @ 18:22)      TELEMETRY: A-Fib in 40s - 50s, PVCs 	        	  ASSESSMENT/PLAN: 78M with PMH of CAD s/p DAYANA to mLAD and POBA to D2 5/2017 with Dr. Harris, AFib on warfarin, T2DM, HLD, HTN who presented from home with worsening PEREZ and LE edema x3 months. TTE revealed EF 45%, severe AS, mild global LV dysfunction and severe pHTN. He is being diuresed with IV Lasix, but continues to have anasarca. Cr 3.7 on admission, now trending down. Nephrology following for ELADIA.     1) Severe AS - evaluation per Structural Heart Team     2) Acute on chronic HFrEF  - Continue diuresis with Lasix, Creatinine is stable  - Patient needs to be diuresed more before proceeding with further evaluation of AS        3) CAD s/p DAYANA  - Continue DAPT, statin	    4) AFib  - CHADS2-VASc - 6  - Continue warfarin, INR goal 2-3  	        Samuel Ramirez  Cardiology Fellow  Consult Fellow carries in-house phone ([phone]) from 7:30 am - 5:00 pm M - F  For non-urgent issues outside of the above time period, please feel free to contact me directly by text or call at [phone]

## 2019-01-19 NOTE — PROGRESS NOTE ADULT - PROBLEM SELECTOR PLAN 3
renal function stable, mild hypernatremia on IV lasix gtt  Will HOLD ACE and metformin and will follow Cr on Lasix as above.    Monitor strict I+Os, wg.   Renal US without obstruction.  TAVR w/u when more optimized.

## 2019-01-19 NOTE — PROGRESS NOTE ADULT - SUBJECTIVE AND OBJECTIVE BOX
Jaylene Rizvi MD  Attending Physician (Hospitalist)  pager: [phone]    Patient is a 78y old  Male who presents with a chief complaint of 3 months of progressive B/L LE oedema and dyspnoea on exertion. (19 Jan 2019 09:20)        SUBJECTIVE / OVERNIGHT EVENTS:  still on HF sating well. patient feels better. no acute overnight issues.   afebrile.     MEDICATIONS  (STANDING):  acetaminophen   Tablet .. 650 milliGRAM(s) Oral once  ALBUTerol/ipratropium for Nebulization 3 milliLiter(s) Nebulizer every 6 hours  amLODIPine   Tablet 5 milliGRAM(s) Oral daily  aspirin enteric coated 81 milliGRAM(s) Oral daily  atorvastatin 40 milliGRAM(s) Oral at bedtime  clopidogrel Tablet 75 milliGRAM(s) Oral daily  dextrose 5%. 1000 milliLiter(s) (50 mL/Hr) IV Continuous <Continuous>  dextrose 50% Injectable 12.5 Gram(s) IV Push once  dextrose 50% Injectable 25 Gram(s) IV Push once  dextrose 50% Injectable 25 Gram(s) IV Push once  finasteride 5 milliGRAM(s) Oral daily  furosemide Infusion 10 mG/Hr (5 mL/Hr) IV Continuous <Continuous>  insulin glargine Injectable (LANTUS) 9 Unit(s) SubCutaneous at bedtime  insulin lispro (HumaLOG) corrective regimen sliding scale   SubCutaneous three times a day before meals  insulin lispro (HumaLOG) corrective regimen sliding scale   SubCutaneous at bedtime  nystatin Powder 1 Application(s) Topical two times a day  potassium chloride    Tablet ER 40 milliEquivalent(s) Oral once  warfarin 2 milliGRAM(s) Oral once    MEDICATIONS  (PRN):  dextrose 40% Gel 15 Gram(s) Oral once PRN Blood Glucose LESS THAN 70 milliGRAM(s)/deciliter  glucagon  Injectable 1 milliGRAM(s) IntraMuscular once PRN Glucose LESS THAN 70 milligrams/deciliter      Vital Signs Last 24 Hrs  T(C): 37 (19 Jan 2019 11:53), Max: 37 (19 Jan 2019 11:53)  T(F): 98.6 (19 Jan 2019 11:53), Max: 98.6 (19 Jan 2019 11:53)  HR: 47 (19 Jan 2019 11:53) (47 - 71)  BP: 138/91 (19 Jan 2019 11:53) (108/65 - 138/91)  BP(mean): --  RR: 18 (19 Jan 2019 11:53) (18 - 21)  SpO2: 98% (19 Jan 2019 11:53) (79% - 100%)  CAPILLARY BLOOD GLUCOSE      POCT Blood Glucose.: 156 mg/dL (19 Jan 2019 11:41)  POCT Blood Glucose.: 131 mg/dL (19 Jan 2019 08:02)  POCT Blood Glucose.: 161 mg/dL (18 Jan 2019 20:58)  POCT Blood Glucose.: 186 mg/dL (18 Jan 2019 16:27)    I&O's Summary    18 Jan 2019 07:01  -  19 Jan 2019 07:00  --------------------------------------------------------  IN: 661 mL / OUT: 3000 mL / NET: -2339 mL    19 Jan 2019 07:01  -  19 Jan 2019 13:55  --------------------------------------------------------  IN: 660 mL / OUT: 0 mL / NET: 660 mL          PHYSICAL EXAM  GENERAL: More comfortable on HF  HEAD:  Atraumatic, Normocephalic  EYES: EOMI, conjunctiva and sclera clear  NECK: Supple, No JVD  CHEST/LUNG: crackles at bases; No wheeze  HEART: Regular rate and rhythm; + systolic murmur.   ABDOMEN: Soft, Nontender, Nondistended; Bowel sounds present, scrotal edema  EXTREMITIES: bilateral LE edema pitting 2 +. venous stasis changes  PSYCH: AAOx3  SKIN: No rashes or lesions    LABS:                        9.3    8.65  )-----------( 206      ( 19 Jan 2019 07:39 )             28.3     01-19    147<H>  |  114<H>  |  78<H>  ----------------------------<  136<H>  3.3<L>   |  16<L>  |  3.24<H>    Ca    9.1      19 Jan 2019 06:56  Phos  4.2     01-17  Mg     2.0     01-18      PT/INR - ( 19 Jan 2019 07:42 )   PT: 32.0 sec;   INR: 2.72 ratio           CARDIAC MARKERS ( 17 Jan 2019 20:25 )  x     / x     / 48 U/L / x     / 3.2 ng/mL            RADIOLOGY & ADDITIONAL TESTS:    Imaging Personally Reviewed:  Consultant(s) Notes Reviewed:    Care Discussed with Consultants/Other Providers:

## 2019-01-19 NOTE — PROGRESS NOTE ADULT - PROBLEM SELECTOR PLAN 3
Likely secondary to CHF exacerbation  - Patient with L and R sided heart failure with severe AS by valve size and dilated LA along with severe pHTN likely secondary to heart failure. Will likely require intervention for valve and continued diuresis to help overall functional status and for resolution of kidney function  -continue lasix drip for now, appreciate TAVR eval.  -once crt stable and less volume overlaoded, can get CTA and cath spaced 72 hours apart

## 2019-01-19 NOTE — PROGRESS NOTE ADULT - PROBLEM SELECTOR PLAN 1
Seem more comfortable on HF will try to wean off HF today if tolerating.   Cont on lasix gtt @10mg/hr and monitor.   D/w card and renal. agree with plan.   echo with EF 45 % with severe AS  structural heart eval appreciated  Daily weights strict I+Os.   cont ASA, Plavix.

## 2019-01-20 LAB
ANION GAP SERPL CALC-SCNC: 12 MMOL/L — SIGNIFICANT CHANGE UP (ref 5–17)
BUN SERPL-MCNC: 76 MG/DL — HIGH (ref 7–23)
CALCIUM SERPL-MCNC: 8.9 MG/DL — SIGNIFICANT CHANGE UP (ref 8.4–10.5)
CHLORIDE SERPL-SCNC: 114 MMOL/L — HIGH (ref 96–108)
CO2 SERPL-SCNC: 17 MMOL/L — LOW (ref 22–31)
CREAT SERPL-MCNC: 3.12 MG/DL — HIGH (ref 0.5–1.3)
GLUCOSE BLDC GLUCOMTR-MCNC: 118 MG/DL — HIGH (ref 70–99)
GLUCOSE BLDC GLUCOMTR-MCNC: 143 MG/DL — HIGH (ref 70–99)
GLUCOSE BLDC GLUCOMTR-MCNC: 173 MG/DL — HIGH (ref 70–99)
GLUCOSE BLDC GLUCOMTR-MCNC: 188 MG/DL — HIGH (ref 70–99)
GLUCOSE SERPL-MCNC: 132 MG/DL — HIGH (ref 70–99)
HCT VFR BLD CALC: 28.6 % — LOW (ref 39–50)
HGB BLD-MCNC: 9.2 G/DL — LOW (ref 13–17)
INR BLD: 3.05 RATIO — HIGH (ref 0.88–1.16)
MAGNESIUM SERPL-MCNC: 1.8 MG/DL — SIGNIFICANT CHANGE UP (ref 1.6–2.6)
MCHC RBC-ENTMCNC: 28.8 PG — SIGNIFICANT CHANGE UP (ref 27–34)
MCHC RBC-ENTMCNC: 32.2 GM/DL — SIGNIFICANT CHANGE UP (ref 32–36)
MCV RBC AUTO: 89.7 FL — SIGNIFICANT CHANGE UP (ref 80–100)
PHOSPHATE SERPL-MCNC: 4.3 MG/DL — SIGNIFICANT CHANGE UP (ref 2.5–4.5)
PLATELET # BLD AUTO: 173 K/UL — SIGNIFICANT CHANGE UP (ref 150–400)
POTASSIUM SERPL-MCNC: 3.8 MMOL/L — SIGNIFICANT CHANGE UP (ref 3.5–5.3)
POTASSIUM SERPL-SCNC: 3.8 MMOL/L — SIGNIFICANT CHANGE UP (ref 3.5–5.3)
PROTHROM AB SERPL-ACNC: 35.3 SEC — HIGH (ref 10–13.1)
RBC # BLD: 3.19 M/UL — LOW (ref 4.2–5.8)
RBC # FLD: 22.6 % — HIGH (ref 10.3–14.5)
SODIUM SERPL-SCNC: 143 MMOL/L — SIGNIFICANT CHANGE UP (ref 135–145)
WBC # BLD: 5.93 K/UL — SIGNIFICANT CHANGE UP (ref 3.8–10.5)
WBC # FLD AUTO: 5.93 K/UL — SIGNIFICANT CHANGE UP (ref 3.8–10.5)

## 2019-01-20 PROCEDURE — 99232 SBSQ HOSP IP/OBS MODERATE 35: CPT

## 2019-01-20 PROCEDURE — 99233 SBSQ HOSP IP/OBS HIGH 50: CPT

## 2019-01-20 RX ORDER — ACETAMINOPHEN 500 MG
650 TABLET ORAL EVERY 6 HOURS
Qty: 0 | Refills: 0 | Status: DISCONTINUED | OUTPATIENT
Start: 2019-01-20 | End: 2019-01-29

## 2019-01-20 RX ORDER — WARFARIN SODIUM 2.5 MG/1
1 TABLET ORAL ONCE
Qty: 0 | Refills: 0 | Status: COMPLETED | OUTPATIENT
Start: 2019-01-20 | End: 2019-01-20

## 2019-01-20 RX ADMIN — Medication 40 MILLIEQUIVALENT(S): at 02:30

## 2019-01-20 RX ADMIN — Medication 5 MG/HR: at 21:21

## 2019-01-20 RX ADMIN — Medication 650 MILLIGRAM(S): at 16:53

## 2019-01-20 RX ADMIN — NYSTATIN CREAM 1 APPLICATION(S): 100000 CREAM TOPICAL at 16:50

## 2019-01-20 RX ADMIN — Medication 3 MILLILITER(S): at 06:04

## 2019-01-20 RX ADMIN — ATORVASTATIN CALCIUM 40 MILLIGRAM(S): 80 TABLET, FILM COATED ORAL at 22:25

## 2019-01-20 RX ADMIN — Medication 1: at 16:50

## 2019-01-20 RX ADMIN — Medication 650 MILLIGRAM(S): at 16:01

## 2019-01-20 RX ADMIN — INSULIN GLARGINE 9 UNIT(S): 100 INJECTION, SOLUTION SUBCUTANEOUS at 22:25

## 2019-01-20 RX ADMIN — WARFARIN SODIUM 1 MILLIGRAM(S): 2.5 TABLET ORAL at 22:25

## 2019-01-20 RX ADMIN — NYSTATIN CREAM 1 APPLICATION(S): 100000 CREAM TOPICAL at 09:26

## 2019-01-20 RX ADMIN — Medication 3 MILLILITER(S): at 12:48

## 2019-01-20 RX ADMIN — Medication 3 MILLILITER(S): at 18:12

## 2019-01-20 RX ADMIN — Medication 5 MG/HR: at 08:14

## 2019-01-20 RX ADMIN — Medication 3 MILLILITER(S): at 23:24

## 2019-01-20 RX ADMIN — FINASTERIDE 5 MILLIGRAM(S): 5 TABLET, FILM COATED ORAL at 12:48

## 2019-01-20 RX ADMIN — AMLODIPINE BESYLATE 5 MILLIGRAM(S): 2.5 TABLET ORAL at 06:04

## 2019-01-20 RX ADMIN — CLOPIDOGREL BISULFATE 75 MILLIGRAM(S): 75 TABLET, FILM COATED ORAL at 12:48

## 2019-01-20 NOTE — PROGRESS NOTE ADULT - SUBJECTIVE AND OBJECTIVE BOX
Patient seen and examined at bedside on 4 Alessio, no family at bedside    Overnight Events: No events overnight. The patient remains on high flow NC and Lasix gtt    Review Of Systems: No chest pain, shortness of breath, or palpitations. He says his breathing and LE edema are improved. Also states his Abd is less distended, is only eating half of his meals            Current Meds:  ALBUTerol/ipratropium for Nebulization 3 milliLiter(s) Nebulizer every 6 hours  amLODIPine   Tablet 5 milliGRAM(s) Oral daily  aspirin enteric coated 81 milliGRAM(s) Oral daily  atorvastatin 40 milliGRAM(s) Oral at bedtime  clopidogrel Tablet 75 milliGRAM(s) Oral daily  finasteride 5 milliGRAM(s) Oral daily  furosemide Infusion 10 mG/Hr IV Continuous <Continuous>  insulin glargine Injectable (LANTUS) 9 Unit(s) SubCutaneous at bedtime  insulin lispro (HumaLOG) corrective regimen sliding scale   SubCutaneous three times a day before meals  insulin lispro (HumaLOG) corrective regimen sliding scale   SubCutaneous at bedtime  nystatin Powder 1 Application(s) Topical two times a day  Coumadin by level    Vitals:  T(F): 97.5 (01-20), Max: 98.6 (01-19)  HR: 55 (01-20) (47 - 71)  BP: 100/68 (01-20) (97/62 - 138/91)  RR: 19 (01-20)  SpO2: 99% on high flow NC (40 LPM)    I&O's Summary    19 Jan 2019 07:01  -  20 Jan 2019 07:00  --------------------------------------------------------  IN: 780 mL / OUT: 1750 mL / NET: -970 mL    No weight documented today    Physical Exam:  Appearance: No acute distress; well appearing, breathing comfortably on High Flow  Eyes: PERRL, EOMI, pink conjunctiva  HEENT: Normal oral mucosa  Cardiovascular: ireg, ireg, 2/6, soft AS murmur w/ out clear S2, no rubs, or gallops; 1+ b/l LE edema; JVP @ 7  Respiratory: Poor insp effort, trace bibasilar rales  Gastrointestinal: soft, non-tender, mildly distended with normal bowel sounds  Musculoskeletal: No clubbing; no joint deformity   Neurologic: Non-focal  Lymphatic: No lymphadenopathy  Psychiatry: AAOx3, mood & affect appropriate  Skin: No rashes, ecchymoses, or cyanosis                          9.2    5.93  )-----------( 173      ( 20 Jan 2019 08:19 )             28.6     01-20    143  |  114<H>  |  76<H>  ----------------------------<  132<H>  3.8   |  17<L>  |  3.12<--3.24<--3.27    Ca    8.9      20 Jan 2019 05:17  Phos  4.3     01-20  Mg     1.8     01-20    PT/INR - ( 20 Jan 2019 08:15 )   PT: 35.3 sec;   INR: 3.05 ratio      CARDIAC MARKERS ( 17 Jan 2019 20:25 )  38 ng/L / x     / x     / 48 U/L / x     / 3.2 ng/mL  CARDIAC MARKERS ( 15 Lazaro 2019 21:30 )  48 ng/L / x     / x     / x     / x     / x      CARDIAC MARKERS ( 15 Lazaro 2019 18:22 )  49 ng/L / x     / x     / x     / x     / x        Serum Pro-Brain Natriuretic Peptide: 55049 pg/mL (01-16 @ 05:49)  Serum Pro-Brain Natriuretic Peptide: 25480 pg/mL (01-15 @ 18:22)    Echo: < from: Transthoracic Echocardiogram (01.16.19 @ 14:19) > Conclusions: 1. Mitral annular calcification. Thickened and tethered mitral valve leaflets. Moderate mitral regurgitation. 2. Calcified aortic valve with decreased opening. Morphology is not well seen. Cannot rule out bicuspid valve. Peak transaortic valve gradient equals 71 mm Hg, mean transaortic valve gradient equals 38 mm Hg, estimated aortic valve area equals 0.8 sqcm (by continuity equation), aortic valve velocity time integral equals 99 cm, consistent with severe aortic stenosis. Mild aortic regurgitation. 3. Severely dilated left atrium.  LA volume index = 57 cc/m2. 4. Eccentric left ventricular hypertrophy (dilated left ventricle with normal relative wall thickness). 5. Mild global left ventricular systolic dysfunction. Flattening of the interventricular septum in both systole and diastole is  consistent with right ventricular pressure (and volume) overload. 6. Right ventricular enlargement with decreased right ventricular systolic function. 7. Normal tricuspid valve. Moderate-severe tricuspid regurgitation. 8. Estimated pulmonary artery systolic pressure equals 71 mm Hg, assuming right atrial pressure equals 10 mm Hg, consistent with severe pulmonary pressures. *** No previous Echo exam.  < end of copied text >    Cath: < from: Cardiac Cath Lab - Adult (05.11.17 @ 12:57) > PROCEDURE: --  Left coronary angiography. --  Right coronary angiography. --  Intervention on mid LAD: drug-eluting stent. --  Intervention on D2: balloon angioplasty.  < end of copied text >    Interpretation of Telemetry: AF 48-60s, PVCs

## 2019-01-20 NOTE — PROGRESS NOTE ADULT - SUBJECTIVE AND OBJECTIVE BOX
Jaylene Rizvi MD  Attending Physician (Hospitalist)  pager: [phone]    Patient is a 78y old  Male who presents with a chief complaint of 3 months of progressive B/L LE oedema and dyspnoea on exertion. (20 Jan 2019 10:26)        SUBJECTIVE / OVERNIGHT EVENTS:  feels better. still requiring HF. desat to 70s when on NC.   No acute issues overnight,      MEDICATIONS  (STANDING):  ALBUTerol/ipratropium for Nebulization 3 milliLiter(s) Nebulizer every 6 hours  amLODIPine   Tablet 5 milliGRAM(s) Oral daily  atorvastatin 40 milliGRAM(s) Oral at bedtime  clopidogrel Tablet 75 milliGRAM(s) Oral daily  dextrose 5%. 1000 milliLiter(s) (50 mL/Hr) IV Continuous <Continuous>  dextrose 50% Injectable 12.5 Gram(s) IV Push once  dextrose 50% Injectable 25 Gram(s) IV Push once  dextrose 50% Injectable 25 Gram(s) IV Push once  finasteride 5 milliGRAM(s) Oral daily  furosemide Infusion 10 mG/Hr (5 mL/Hr) IV Continuous <Continuous>  insulin glargine Injectable (LANTUS) 9 Unit(s) SubCutaneous at bedtime  insulin lispro (HumaLOG) corrective regimen sliding scale   SubCutaneous three times a day before meals  insulin lispro (HumaLOG) corrective regimen sliding scale   SubCutaneous at bedtime  nystatin Powder 1 Application(s) Topical two times a day    MEDICATIONS  (PRN):  dextrose 40% Gel 15 Gram(s) Oral once PRN Blood Glucose LESS THAN 70 milliGRAM(s)/deciliter  glucagon  Injectable 1 milliGRAM(s) IntraMuscular once PRN Glucose LESS THAN 70 milligrams/deciliter      Vital Signs Last 24 Hrs  T(C): 36.4 (20 Jan 2019 05:11), Max: 36.7 (19 Jan 2019 16:14)  T(F): 97.5 (20 Jan 2019 05:11), Max: 98 (19 Jan 2019 16:14)  HR: 60 (20 Jan 2019 09:05) (54 - 65)  BP: 100/68 (20 Jan 2019 05:11) (97/62 - 100/68)  BP(mean): --  RR: 22 (20 Jan 2019 09:05) (18 - 22)  SpO2: 100% (20 Jan 2019 09:05) (98% - 100%)  CAPILLARY BLOOD GLUCOSE      POCT Blood Glucose.: 143 mg/dL (20 Jan 2019 12:01)  POCT Blood Glucose.: 118 mg/dL (20 Jan 2019 08:06)  POCT Blood Glucose.: 163 mg/dL (19 Jan 2019 21:38)  POCT Blood Glucose.: 128 mg/dL (19 Jan 2019 16:32)    I&O's Summary    19 Jan 2019 07:01  -  20 Jan 2019 07:00  --------------------------------------------------------  IN: 780 mL / OUT: 1750 mL / NET: -970 mL    20 Jan 2019 07:01  -  20 Jan 2019 12:15  --------------------------------------------------------  IN: 300 mL / OUT: 0 mL / NET: 300 mL          PHYSICAL EXAM  GENERAL: More comfortable on HF  HEAD:  Atraumatic, Normocephalic  EYES: EOMI, conjunctiva and sclera clear  NECK: Supple, No JVD  CHEST/LUNG: crackles at bases; No wheeze  HEART: Regular rate and rhythm; + systolic murmur.   ABDOMEN: Soft, Nontender, Nondistended; Bowel sounds present, scrotal edema  EXTREMITIES: bilateral LE edema pitting 2 +. venous stasis changes  PSYCH: AAOx3  SKIN: No rashes or lesions    LABS:                        9.2    5.93  )-----------( 173      ( 20 Jan 2019 08:19 )             28.6     01-20    143  |  114<H>  |  76<H>  ----------------------------<  132<H>  3.8   |  17<L>  |  3.12<H>    Ca    8.9      20 Jan 2019 05:17  Phos  4.3     01-20  Mg     1.8     01-20      PT/INR - ( 20 Jan 2019 08:15 )   PT: 35.3 sec;   INR: 3.05 ratio                     RADIOLOGY & ADDITIONAL TESTS:    Imaging Personally Reviewed:  Consultant(s) Notes Reviewed:    Care Discussed with Consultants/Other Providers:

## 2019-01-20 NOTE — PROGRESS NOTE ADULT - PROBLEM SELECTOR PLAN 1
Likely cardiorenal in nature and crt improving with lasix drip  cont decongestion via drip for few more days  Monitor BP as tight AS

## 2019-01-20 NOTE — PROGRESS NOTE ADULT - PROBLEM SELECTOR PLAN 1
Did not tolerate NC yesterday with desat to 70s. will cont on HF  Cont on lasix gtt @10mg/hr and monitor.   echo with EF 45 % with severe AS  structural heart eval appreciated  Daily weights strict I+Os.   cont ASA, Plavix.

## 2019-01-20 NOTE — PROGRESS NOTE ADULT - SUBJECTIVE AND OBJECTIVE BOX
[facility] DIVISION OF KIDNEY DISEASES AND HYPERTENSION -- FOLLOW UP NOTE  --------------------------------------------------------------------------------  Chief Complaint:  ELADIA    24 hour events/subjective:  making good UO on drip      PAST HISTORY  --------------------------------------------------------------------------------  No significant changes to PMH, PSH, FHx, SHx, unless otherwise noted    ALLERGIES & MEDICATIONS  --------------------------------------------------------------------------------  Allergies    latex (Unknown)  Sulfacetamide Sodium (Unknown)    Intolerances      Standing Inpatient Medications  ALBUTerol/ipratropium for Nebulization 3 milliLiter(s) Nebulizer every 6 hours  amLODIPine   Tablet 5 milliGRAM(s) Oral daily  aspirin enteric coated 81 milliGRAM(s) Oral daily  atorvastatin 40 milliGRAM(s) Oral at bedtime  clopidogrel Tablet 75 milliGRAM(s) Oral daily  dextrose 5%. 1000 milliLiter(s) IV Continuous <Continuous>  dextrose 50% Injectable 12.5 Gram(s) IV Push once  dextrose 50% Injectable 25 Gram(s) IV Push once  dextrose 50% Injectable 25 Gram(s) IV Push once  finasteride 5 milliGRAM(s) Oral daily  furosemide Infusion 10 mG/Hr IV Continuous <Continuous>  insulin glargine Injectable (LANTUS) 9 Unit(s) SubCutaneous at bedtime  insulin lispro (HumaLOG) corrective regimen sliding scale   SubCutaneous three times a day before meals  insulin lispro (HumaLOG) corrective regimen sliding scale   SubCutaneous at bedtime  nystatin Powder 1 Application(s) Topical two times a day    PRN Inpatient Medications  dextrose 40% Gel 15 Gram(s) Oral once PRN  glucagon  Injectable 1 milliGRAM(s) IntraMuscular once PRN      REVIEW OF SYSTEMS  Gen: No weight changes, fatigue, fevers/chills, weakness  Head/Eyes/Ears/Mouth: No headache; Normal hearing; Normal vision    Respiratory: No dyspnea, cough, wheezing, hemoptysis  CV: No chest pain, PND, orthopnea  GI: No abdominal pain, diarrhea, constipation, nausea, vomiting, melena, hematochezia  : No increased frequency, dysuria, hematuria, nocturia  MSK: No joint pain/swelling; no back pain; no edema   Heme: No easy bruising or bleeding  All other systems were reviewed and are negative, except as noted.      VITALS/PHYSICAL EXAM  --------------------------------------------------------------------------------  T(C): 36.4 (01-20-19 @ 05:11), Max: 37 (01-19-19 @ 11:53)  HR: 60 (01-20-19 @ 09:05) (47 - 71)  BP: 100/68 (01-20-19 @ 05:11) (97/62 - 138/91)  RR: 22 (01-20-19 @ 09:05) (18 - 22)  SpO2: 100% (01-20-19 @ 09:05) (79% - 100%)  Wt(kg): --        01-19-19 @ 07:01  -  01-20-19 @ 07:00  --------------------------------------------------------  IN: 780 mL / OUT: 1750 mL / NET: -970 mL    01-20-19 @ 07:01  -  01-20-19 @ 10:26  --------------------------------------------------------  IN: 300 mL / OUT: 0 mL / NET: 300 mL      PHYSICAL EXAM: vital signs as above  in no apparent distress  Neck: Supple, no JVD,    Lungs: + crackles, + rhonci  CVS: S1 S2 no M/R/G  Abdomen: no tenderness, no organomegaly, BS present  Neuro: Grossly intact  Skin: warm, dry  Ext: no cyanosis or clubbing, 1+ edema    LABS/STUDIES  --------------------------------------------------------------------------------              9.2    5.93  >-----------<  173      [01-20-19 @ 08:19]              28.6     143  |  114  |  76  ----------------------------<  132      [01-20-19 @ 05:17]  3.8   |  17  |  3.12        Ca     8.9     [01-20-19 @ 05:17]      Mg     1.8     [01-20-19 @ 05:17]      Phos  4.3     [01-20-19 @ 05:17]      PT/INR: PT 35.3 , INR 3.05       [01-20-19 @ 08:15]      Creatinine Trend:  SCr 3.12 [01-20 @ 05:17]  SCr 3.24 [01-19 @ 06:56]  SCr 3.27 [01-18 @ 06:41]  SCr 3.31 [01-17 @ 20:25]  SCr 3.54 [01-17 @ 06:12]    Urinalysis - [01-16-19 @ 08:36]      Color Yellow / Appearance Slightly Turbid / SG 1.011 / pH 5.5      Gluc Negative / Ketone Negative  / Bili Negative / Urobili Negative       Blood Small / Protein Negative / Leuk Est Large / Nitrite Negative      RBC 10 /  / Hyaline 0 / Gran  / Sq Epi  / Non Sq Epi 0 / Bacteria Negative    Urine Creatinine 28      [01-16-19 @ 02:23]  Urine Protein 17      [01-16-19 @ 02:23]  Urine Albumin 7.1      [01-16-19 @ 02:23]  Urine Sodium 86      [01-16-19 @ 03:47]  Urine Osmolality 290      [01-16-19 @ 08:36]    HbA1c 5.6      [01-16-19 @ 08:24]

## 2019-01-20 NOTE — PROGRESS NOTE ADULT - PROBLEM SELECTOR PLAN 3
renal function stable, hypernatremia resolved.    Will HOLD ACE and metformin and will follow Cr on Lasix as above.    Monitor strict I+Os, wg.   Renal US without obstruction.  TAVR w/u when more optimized.

## 2019-01-21 LAB
ANION GAP SERPL CALC-SCNC: 16 MMOL/L — SIGNIFICANT CHANGE UP (ref 5–17)
APTT BLD: 43.2 SEC — HIGH (ref 27.5–36.3)
BUN SERPL-MCNC: 78 MG/DL — HIGH (ref 7–23)
CALCIUM SERPL-MCNC: 8.9 MG/DL — SIGNIFICANT CHANGE UP (ref 8.4–10.5)
CHLORIDE SERPL-SCNC: 109 MMOL/L — HIGH (ref 96–108)
CO2 SERPL-SCNC: 18 MMOL/L — LOW (ref 22–31)
CREAT SERPL-MCNC: 2.74 MG/DL — HIGH (ref 0.5–1.3)
GLUCOSE BLDC GLUCOMTR-MCNC: 151 MG/DL — HIGH (ref 70–99)
GLUCOSE BLDC GLUCOMTR-MCNC: 159 MG/DL — HIGH (ref 70–99)
GLUCOSE BLDC GLUCOMTR-MCNC: 184 MG/DL — HIGH (ref 70–99)
GLUCOSE BLDC GLUCOMTR-MCNC: 210 MG/DL — HIGH (ref 70–99)
GLUCOSE SERPL-MCNC: 192 MG/DL — HIGH (ref 70–99)
INR BLD: 3.8 RATIO — HIGH (ref 0.88–1.16)
MAGNESIUM SERPL-MCNC: 1.5 MG/DL — LOW (ref 1.6–2.6)
PHOSPHATE SERPL-MCNC: 3.7 MG/DL — SIGNIFICANT CHANGE UP (ref 2.5–4.5)
POTASSIUM SERPL-MCNC: 3.4 MMOL/L — LOW (ref 3.5–5.3)
POTASSIUM SERPL-SCNC: 3.4 MMOL/L — LOW (ref 3.5–5.3)
PROTHROM AB SERPL-ACNC: 45.2 SEC — HIGH (ref 10–13.1)
SODIUM SERPL-SCNC: 143 MMOL/L — SIGNIFICANT CHANGE UP (ref 135–145)

## 2019-01-21 PROCEDURE — 99232 SBSQ HOSP IP/OBS MODERATE 35: CPT

## 2019-01-21 PROCEDURE — 99233 SBSQ HOSP IP/OBS HIGH 50: CPT

## 2019-01-21 RX ORDER — POTASSIUM CHLORIDE 20 MEQ
40 PACKET (EA) ORAL ONCE
Qty: 0 | Refills: 0 | Status: COMPLETED | OUTPATIENT
Start: 2019-01-21 | End: 2019-01-21

## 2019-01-21 RX ORDER — MAGNESIUM SULFATE 500 MG/ML
1 VIAL (ML) INJECTION ONCE
Qty: 0 | Refills: 0 | Status: COMPLETED | OUTPATIENT
Start: 2019-01-21 | End: 2019-01-21

## 2019-01-21 RX ADMIN — Medication 3 MILLILITER(S): at 06:28

## 2019-01-21 RX ADMIN — NYSTATIN CREAM 1 APPLICATION(S): 100000 CREAM TOPICAL at 08:08

## 2019-01-21 RX ADMIN — AMLODIPINE BESYLATE 5 MILLIGRAM(S): 2.5 TABLET ORAL at 06:28

## 2019-01-21 RX ADMIN — Medication 5 MG/HR: at 12:23

## 2019-01-21 RX ADMIN — Medication 650 MILLIGRAM(S): at 22:19

## 2019-01-21 RX ADMIN — Medication 650 MILLIGRAM(S): at 21:49

## 2019-01-21 RX ADMIN — Medication 3 MILLILITER(S): at 12:22

## 2019-01-21 RX ADMIN — Medication 2: at 16:49

## 2019-01-21 RX ADMIN — Medication 1: at 08:08

## 2019-01-21 RX ADMIN — Medication 100 GRAM(S): at 23:09

## 2019-01-21 RX ADMIN — NYSTATIN CREAM 1 APPLICATION(S): 100000 CREAM TOPICAL at 16:50

## 2019-01-21 RX ADMIN — FINASTERIDE 5 MILLIGRAM(S): 5 TABLET, FILM COATED ORAL at 12:23

## 2019-01-21 RX ADMIN — ATORVASTATIN CALCIUM 40 MILLIGRAM(S): 80 TABLET, FILM COATED ORAL at 21:49

## 2019-01-21 RX ADMIN — CLOPIDOGREL BISULFATE 75 MILLIGRAM(S): 75 TABLET, FILM COATED ORAL at 12:23

## 2019-01-21 RX ADMIN — Medication 1: at 12:22

## 2019-01-21 RX ADMIN — Medication 40 MILLIEQUIVALENT(S): at 23:08

## 2019-01-21 RX ADMIN — Medication 3 MILLILITER(S): at 23:09

## 2019-01-21 RX ADMIN — INSULIN GLARGINE 9 UNIT(S): 100 INJECTION, SOLUTION SUBCUTANEOUS at 21:48

## 2019-01-21 NOTE — PROGRESS NOTE ADULT - SUBJECTIVE AND OBJECTIVE BOX
Jaylene Rizvi MD  Attending Physician (Hospitalist)  pager: [phone]    Patient is a 78y old  Male who presents with a chief complaint of 3 months of progressive B/L LE oedema and dyspnoea on exertion. (21 Jan 2019 10:44)        SUBJECTIVE / OVERNIGHT EVENTS:  Feels ok. some left posterior should pain. breathing stable on HF.   No BMs 2 days. no overnight issues.   afebrile.     MEDICATIONS  (STANDING):  ALBUTerol/ipratropium for Nebulization 3 milliLiter(s) Nebulizer every 6 hours  amLODIPine   Tablet 5 milliGRAM(s) Oral daily  atorvastatin 40 milliGRAM(s) Oral at bedtime  clopidogrel Tablet 75 milliGRAM(s) Oral daily  dextrose 5%. 1000 milliLiter(s) (50 mL/Hr) IV Continuous <Continuous>  dextrose 50% Injectable 12.5 Gram(s) IV Push once  dextrose 50% Injectable 25 Gram(s) IV Push once  dextrose 50% Injectable 25 Gram(s) IV Push once  finasteride 5 milliGRAM(s) Oral daily  furosemide Infusion 10 mG/Hr (5 mL/Hr) IV Continuous <Continuous>  insulin glargine Injectable (LANTUS) 9 Unit(s) SubCutaneous at bedtime  insulin lispro (HumaLOG) corrective regimen sliding scale   SubCutaneous three times a day before meals  insulin lispro (HumaLOG) corrective regimen sliding scale   SubCutaneous at bedtime  nystatin Powder 1 Application(s) Topical two times a day    MEDICATIONS  (PRN):  acetaminophen   Tablet .. 650 milliGRAM(s) Oral every 6 hours PRN Moderate Pain (4 - 6)  dextrose 40% Gel 15 Gram(s) Oral once PRN Blood Glucose LESS THAN 70 milliGRAM(s)/deciliter  glucagon  Injectable 1 milliGRAM(s) IntraMuscular once PRN Glucose LESS THAN 70 milligrams/deciliter      Vital Signs Last 24 Hrs  T(C): 36.4 (21 Jan 2019 12:19), Max: 36.8 (20 Jan 2019 13:55)  T(F): 97.6 (21 Jan 2019 12:19), Max: 98.2 (20 Jan 2019 13:55)  HR: 62 (21 Jan 2019 12:19) (58 - 80)  BP: 111/76 (21 Jan 2019 12:19) (99/64 - 120/74)  BP(mean): --  RR: 19 (21 Jan 2019 12:19) (18 - 20)  SpO2: 100% (21 Jan 2019 12:19) (98% - 100%)  CAPILLARY BLOOD GLUCOSE      POCT Blood Glucose.: 184 mg/dL (21 Jan 2019 11:56)  POCT Blood Glucose.: 159 mg/dL (21 Jan 2019 07:39)  POCT Blood Glucose.: 188 mg/dL (20 Jan 2019 21:28)  POCT Blood Glucose.: 173 mg/dL (20 Jan 2019 16:33)    I&O's Summary    20 Jan 2019 07:01  -  21 Jan 2019 07:00  --------------------------------------------------------  IN: 660 mL / OUT: 1900 mL / NET: -1240 mL          PHYSICAL EXAM  GENERAL: More comfortable on HF  HEAD:  Atraumatic, Normocephalic  EYES: EOMI, conjunctiva and sclera clear  NECK: Supple, No JVD  CHEST/LUNG: crackles at bases; No wheeze  HEART: Regular rate and rhythm; + systolic murmur.   ABDOMEN: Soft, Nontender, Nondistended; Bowel sounds present, scrotal edema  EXTREMITIES: bilateral LE edema pitting 2 +. venous stasis changes  PSYCH: AAOx3  SKIN: No rashes or lesions    LABS:                        9.2    5.93  )-----------( 173      ( 20 Jan 2019 08:19 )             28.6     01-20    143  |  114<H>  |  76<H>  ----------------------------<  132<H>  3.8   |  17<L>  |  3.12<H>    Ca    8.9      20 Jan 2019 05:17  Phos  4.3     01-20  Mg     1.8     01-20      PT/INR - ( 21 Jan 2019 07:45 )   PT: 45.2 sec;   INR: 3.80 ratio         PTT - ( 21 Jan 2019 07:45 )  PTT:43.2 sec            RADIOLOGY & ADDITIONAL TESTS:    Imaging Personally Reviewed:  Consultant(s) Notes Reviewed:    Care Discussed with Consultants/Other Providers:

## 2019-01-21 NOTE — PROGRESS NOTE ADULT - PROBLEM SELECTOR PLAN 1
Acute hypoxic resp failure.   will cont to wean down on HF and monitor. d/w nursing staff.   Cont on lasix gtt @10mg/hr and monitor.   echo with EF 45 % with severe AS  structural heart eval appreciated  Daily weights strict I+Os.   cont ASA, Plavix.

## 2019-01-21 NOTE — CHART NOTE - NSCHARTNOTEFT_GEN_A_CORE
Called by RN as pt had 6 beats WCT, asymptomatic.      PAST MEDICAL & SURGICAL HISTORY:  CAD (coronary artery disease)  Prostatitis syndrome  UTI (urinary tract infection)  Diabetes  Stented coronary artery  High cholesterol  HTN (hypertension)  Afib  S/p bare metal coronary artery stent    Vital Signs Last 24 Hrs  T(C): 36.3 (21 Jan 2019 13:20), Max: 36.4 (21 Jan 2019 04:31)  T(F): 97.4 (21 Jan 2019 13:20), Max: 97.6 (21 Jan 2019 12:19)  HR: 73 (21 Jan 2019 21:02) (62 - 80)  BP: 107/71 (21 Jan 2019 21:02) (99/64 - 117/66)  BP(mean): --  RR: 19 (21 Jan 2019 21:02) (18 - 20)  SpO2: 99% (21 Jan 2019 21:02) (98% - 100%)    Event Summary:  78 year old male with  h/o AS, CAD, PCI, afib on coumadin HTN, admitted with epistaxis, supratherapeutic INR, and acute on chronic CHF on Lasix gtt.  Now with 6 beats WCT likely from electrolyte imbalance. K noted to be 3.4 and Magnesium 1.5. Will give Kdur 40 po X 1 and Magnesium 1 gram IV X 1. Will recheck BMP, Mg and Phos in AM.    Selin Sam NP  [id number]

## 2019-01-21 NOTE — PROGRESS NOTE ADULT - SUBJECTIVE AND OBJECTIVE BOX
[facility] DIVISION OF KIDNEY DISEASES AND HYPERTENSION -- FOLLOW UP NOTE  --------------------------------------------------------------------------------  Chief Complaint:  ELADIA on CKD    24 hour events/subjective:        PAST HISTORY  --------------------------------------------------------------------------------  No significant changes to PMH, PSH, FHx, SHx, unless otherwise noted    ALLERGIES & MEDICATIONS  --------------------------------------------------------------------------------  Allergies    latex (Unknown)  Sulfacetamide Sodium (Unknown)    Intolerances      Standing Inpatient Medications  ALBUTerol/ipratropium for Nebulization 3 milliLiter(s) Nebulizer every 6 hours  amLODIPine   Tablet 5 milliGRAM(s) Oral daily  atorvastatin 40 milliGRAM(s) Oral at bedtime  clopidogrel Tablet 75 milliGRAM(s) Oral daily  dextrose 5%. 1000 milliLiter(s) IV Continuous <Continuous>  dextrose 50% Injectable 12.5 Gram(s) IV Push once  dextrose 50% Injectable 25 Gram(s) IV Push once  dextrose 50% Injectable 25 Gram(s) IV Push once  finasteride 5 milliGRAM(s) Oral daily  furosemide Infusion 10 mG/Hr IV Continuous <Continuous>  insulin glargine Injectable (LANTUS) 9 Unit(s) SubCutaneous at bedtime  insulin lispro (HumaLOG) corrective regimen sliding scale   SubCutaneous three times a day before meals  insulin lispro (HumaLOG) corrective regimen sliding scale   SubCutaneous at bedtime  nystatin Powder 1 Application(s) Topical two times a day    PRN Inpatient Medications  acetaminophen   Tablet .. 650 milliGRAM(s) Oral every 6 hours PRN  dextrose 40% Gel 15 Gram(s) Oral once PRN  glucagon  Injectable 1 milliGRAM(s) IntraMuscular once PRN      REVIEW OF SYSTEMS  --------------------------------------------------------------------------------  Gen: No weight changes, fatigue, fevers/chills, weakness  Head/Eyes/Ears/Mouth: No headache; Normal hearing; Normal vision    Respiratory: No dyspnea, cough, wheezing, hemoptysis  CV: No chest pain, PND, orthopnea  GI: No abdominal pain, diarrhea, constipation, nausea, vomiting, melena, hematochezia  : No increased frequency, dysuria, hematuria, nocturia  MSK: No joint pain/swelling; no back pain; no edema   Heme: No easy bruising or bleeding  All other systems were reviewed and are negative, except as noted.    VITALS/PHYSICAL EXAM  --------------------------------------------------------------------------------  T(C): 36.4 (01-21-19 @ 04:31), Max: 36.8 (01-20-19 @ 13:55)  HR: 80 (01-21-19 @ 08:55) (58 - 80)  BP: 99/64 (01-21-19 @ 04:31) (99/64 - 131/80)  RR: 20 (01-21-19 @ 08:55) (18 - 20)  SpO2: 98% (01-21-19 @ 08:55) (98% - 100%)  Wt(kg): --        01-20-19 @ 07:01  -  01-21-19 @ 07:00  --------------------------------------------------------  IN: 660 mL / OUT: 1900 mL / NET: -1240 mL      PHYSICAL EXAM: vital signs as above  in mod distress  Neck: Supple, no JVD,    Lungs: no rhonchi, no wheeze, no crackles  CVS: S1 S2 no M/R/G  Abdomen: no tenderness, no organomegaly, BS present  Neuro: Grossly intact  Skin: warm, dry  Ext: +edema bl        LABS/STUDIES  --------------------------------------------------------------------------------              9.2    5.93  >-----------<  173      [01-20-19 @ 08:19]              28.6     143  |  114  |  76  ----------------------------<  132      [01-20-19 @ 05:17]  3.8   |  17  |  3.12        Ca     8.9     [01-20-19 @ 05:17]      Mg     1.8     [01-20-19 @ 05:17]      Phos  4.3     [01-20-19 @ 05:17]      PT/INR: PT 45.2 , INR 3.80       [01-21-19 @ 07:45]  PTT: 43.2       [01-21-19 @ 07:45]      Creatinine Trend:  SCr 3.12 [01-20 @ 05:17]  SCr 3.24 [01-19 @ 06:56]  SCr 3.27 [01-18 @ 06:41]  SCr 3.31 [01-17 @ 20:25]  SCr 3.54 [01-17 @ 06:12]    Urinalysis - [01-16-19 @ 08:36]      Color Yellow / Appearance Slightly Turbid / SG 1.011 / pH 5.5      Gluc Negative / Ketone Negative  / Bili Negative / Urobili Negative       Blood Small / Protein Negative / Leuk Est Large / Nitrite Negative      RBC 10 /  / Hyaline 0 / Gran  / Sq Epi  / Non Sq Epi 0 / Bacteria Negative    Urine Creatinine 28      [01-16-19 @ 02:23]  Urine Protein 17      [01-16-19 @ 02:23]  Urine Albumin 7.1      [01-16-19 @ 02:23]  Urine Sodium 86      [01-16-19 @ 03:47]  Urine Osmolality 290      [01-16-19 @ 08:36]    HbA1c 5.6      [01-16-19 @ 08:24]

## 2019-01-22 DIAGNOSIS — J96.01 ACUTE RESPIRATORY FAILURE WITH HYPOXIA: ICD-10-CM

## 2019-01-22 LAB
ANION GAP SERPL CALC-SCNC: 17 MMOL/L — SIGNIFICANT CHANGE UP (ref 5–17)
BUN SERPL-MCNC: 81 MG/DL — HIGH (ref 7–23)
CALCIUM SERPL-MCNC: 9.1 MG/DL — SIGNIFICANT CHANGE UP (ref 8.4–10.5)
CHLORIDE SERPL-SCNC: 108 MMOL/L — SIGNIFICANT CHANGE UP (ref 96–108)
CO2 SERPL-SCNC: 19 MMOL/L — LOW (ref 22–31)
CREAT SERPL-MCNC: 2.76 MG/DL — HIGH (ref 0.5–1.3)
GLUCOSE BLDC GLUCOMTR-MCNC: 132 MG/DL — HIGH (ref 70–99)
GLUCOSE BLDC GLUCOMTR-MCNC: 193 MG/DL — HIGH (ref 70–99)
GLUCOSE SERPL-MCNC: 144 MG/DL — HIGH (ref 70–99)
HCT VFR BLD CALC: 31 % — LOW (ref 39–50)
HGB BLD-MCNC: 10.1 G/DL — LOW (ref 13–17)
INR BLD: 4.72 RATIO — HIGH (ref 0.88–1.16)
MAGNESIUM SERPL-MCNC: 1.8 MG/DL — SIGNIFICANT CHANGE UP (ref 1.6–2.6)
MCHC RBC-ENTMCNC: 29 PG — SIGNIFICANT CHANGE UP (ref 27–34)
MCHC RBC-ENTMCNC: 32.6 GM/DL — SIGNIFICANT CHANGE UP (ref 32–36)
MCV RBC AUTO: 89.1 FL — SIGNIFICANT CHANGE UP (ref 80–100)
PHOSPHATE SERPL-MCNC: 4.2 MG/DL — SIGNIFICANT CHANGE UP (ref 2.5–4.5)
PLATELET # BLD AUTO: 172 K/UL — SIGNIFICANT CHANGE UP (ref 150–400)
POTASSIUM SERPL-MCNC: 3.5 MMOL/L — SIGNIFICANT CHANGE UP (ref 3.5–5.3)
POTASSIUM SERPL-SCNC: 3.5 MMOL/L — SIGNIFICANT CHANGE UP (ref 3.5–5.3)
PROTHROM AB SERPL-ACNC: 56.4 SEC — HIGH (ref 10–13.1)
RBC # BLD: 3.48 M/UL — LOW (ref 4.2–5.8)
RBC # FLD: 22.5 % — HIGH (ref 10.3–14.5)
SODIUM SERPL-SCNC: 144 MMOL/L — SIGNIFICANT CHANGE UP (ref 135–145)
WBC # BLD: 10.06 K/UL — SIGNIFICANT CHANGE UP (ref 3.8–10.5)
WBC # FLD AUTO: 10.06 K/UL — SIGNIFICANT CHANGE UP (ref 3.8–10.5)

## 2019-01-22 PROCEDURE — 99233 SBSQ HOSP IP/OBS HIGH 50: CPT

## 2019-01-22 PROCEDURE — 99233 SBSQ HOSP IP/OBS HIGH 50: CPT | Mod: GC

## 2019-01-22 PROCEDURE — 99232 SBSQ HOSP IP/OBS MODERATE 35: CPT | Mod: GC

## 2019-01-22 RX ADMIN — Medication 3 MILLILITER(S): at 05:57

## 2019-01-22 RX ADMIN — NYSTATIN CREAM 1 APPLICATION(S): 100000 CREAM TOPICAL at 17:01

## 2019-01-22 RX ADMIN — INSULIN GLARGINE 9 UNIT(S): 100 INJECTION, SOLUTION SUBCUTANEOUS at 22:11

## 2019-01-22 RX ADMIN — CLOPIDOGREL BISULFATE 75 MILLIGRAM(S): 75 TABLET, FILM COATED ORAL at 11:56

## 2019-01-22 RX ADMIN — Medication 1: at 11:56

## 2019-01-22 RX ADMIN — FINASTERIDE 5 MILLIGRAM(S): 5 TABLET, FILM COATED ORAL at 11:56

## 2019-01-22 RX ADMIN — Medication 650 MILLIGRAM(S): at 21:54

## 2019-01-22 RX ADMIN — Medication 3 MILLILITER(S): at 17:03

## 2019-01-22 RX ADMIN — Medication 3 MILLILITER(S): at 11:56

## 2019-01-22 RX ADMIN — Medication 2: at 17:01

## 2019-01-22 RX ADMIN — NYSTATIN CREAM 1 APPLICATION(S): 100000 CREAM TOPICAL at 05:57

## 2019-01-22 RX ADMIN — Medication 3 MILLILITER(S): at 23:00

## 2019-01-22 RX ADMIN — AMLODIPINE BESYLATE 5 MILLIGRAM(S): 2.5 TABLET ORAL at 05:56

## 2019-01-22 RX ADMIN — ATORVASTATIN CALCIUM 40 MILLIGRAM(S): 80 TABLET, FILM COATED ORAL at 21:55

## 2019-01-22 RX ADMIN — Medication 650 MILLIGRAM(S): at 22:24

## 2019-01-22 NOTE — PROGRESS NOTE ADULT - PROBLEM SELECTOR PLAN 3
Likely secondary to CHF exacerbation  - Patient with L and R sided heart failure with severe AS by valve size and dilated LA along with severe pHTN likely secondary to heart failure. Will likely require intervention for valve and continued diuresis to help overall functional status and for resolution of kidney function  -continue lasix drip for now, appreciate TAVR eval.  -once crt stable and less volume overloaded, can get CTA and cath spaced 72 hours apart

## 2019-01-22 NOTE — PROGRESS NOTE ADULT - PROBLEM SELECTOR PLAN 1
Cont on lasix gtt @10mg/hr and monitor.   echo with EF 45 % with severe AS with right sided heart failure  structural heart eval appreciated  Daily weights strict I+Os.   cont ASA, Plavix.

## 2019-01-22 NOTE — PROVIDER CONTACT NOTE (OTHER) - SITUATION
pt had 6 beats WCT first time, pt has history of 7 beats polymorphic vtach and 8 beats AIVR, pt on lasix gtt

## 2019-01-22 NOTE — PROGRESS NOTE ADULT - PROBLEM SELECTOR PLAN 1
Likely cardiorenal in nature and Crt improving with lasix drip  Cont decongestion via drip for few more days  Monitor BP as tight AS and patient is preload dependent

## 2019-01-22 NOTE — PROGRESS NOTE ADULT - SUBJECTIVE AND OBJECTIVE BOX
Patient is a 78y old  Male who presents with a chief complaint of 3 months of progressive B/L LE oedema and dyspnoea on exertion. (2019 12:32)      INTERVAL HPI/OVERNIGHT EVENTS:  No acute overnight events, patient reports feeling well.      Heart failure  Family history of hypertension (Mother)  Handoff  MEWS Score  CAD (coronary artery disease)  Prostatitis syndrome  UTI (urinary tract infection)  Diabetes  Stented coronary artery  High cholesterol  HTN (hypertension)  Afib  CHF (congestive heart failure)  Hypernatremia  CAD (coronary artery disease)  CHF (congestive heart failure)  Severe aortic stenosis  Epistaxis  Scrotal edema  Chronic atrial fibrillation  Type 2 diabetes mellitus with other diabetic kidney complication, without long-term current use of insulin  Bradycardia  Acute on chronic systolic congestive heart failure  HTN (hypertension)  Edema  Metabolic acidosis  ELADIA (acute kidney injury)  Urethral catheterization  S/p bare metal coronary artery stent  No significant past surgical history  PEREZ 3 MONTHS  90+  ARF (acute renal failure)      Review of Systems: 12 point review of systems otherwise negative  ( - )fevers/chills  ( - ) dyspnea  ( - ) cough  ( - ) chest pain  ( - ) palpitations  ( - ) dizziness/lightheadedness  ( - ) nausea/vomiting  ( - ) abd pain  ( - ) diarrhea  ( - ) melena  ( - ) hematochezia  ( - ) dysuria  ( - ) hematuria  ( - ) leg swelling  ( -) calf tenderness  ( - ) motor weakness  ( - ) extremity numbness  ( - ) back pain  ( + ) tolerating POs  ( + ) BM    MEDICATIONS  (STANDING):  ALBUTerol/ipratropium for Nebulization 3 milliLiter(s) Nebulizer every 6 hours  amLODIPine   Tablet 5 milliGRAM(s) Oral daily  atorvastatin 40 milliGRAM(s) Oral at bedtime  clopidogrel Tablet 75 milliGRAM(s) Oral daily  dextrose 5%. 1000 milliLiter(s) (50 mL/Hr) IV Continuous <Continuous>  dextrose 50% Injectable 12.5 Gram(s) IV Push once  dextrose 50% Injectable 25 Gram(s) IV Push once  dextrose 50% Injectable 25 Gram(s) IV Push once  finasteride 5 milliGRAM(s) Oral daily  furosemide Infusion 10 mG/Hr (5 mL/Hr) IV Continuous <Continuous>  insulin glargine Injectable (LANTUS) 9 Unit(s) SubCutaneous at bedtime  insulin lispro (HumaLOG) corrective regimen sliding scale   SubCutaneous three times a day before meals  insulin lispro (HumaLOG) corrective regimen sliding scale   SubCutaneous at bedtime  nystatin Powder 1 Application(s) Topical two times a day    MEDICATIONS  (PRN):  acetaminophen   Tablet .. 650 milliGRAM(s) Oral every 6 hours PRN Moderate Pain (4 - 6)  dextrose 40% Gel 15 Gram(s) Oral once PRN Blood Glucose LESS THAN 70 milliGRAM(s)/deciliter  glucagon  Injectable 1 milliGRAM(s) IntraMuscular once PRN Glucose LESS THAN 70 milligrams/deciliter      Allergies    latex (Unknown)  Sulfacetamide Sodium (Unknown)    Intolerances          Vital Signs Last 24 Hrs  T(C): 36.5 (2019 11:44), Max: 36.5 (2019 11:44)  T(F): 97.7 (2019 11:44), Max: 97.7 (2019 11:44)  HR: 69 (2019 11:44) (64 - 73)  BP: 103/67 (2019 11:44) (103/67 - 117/70)  BP(mean): --  RR: 18 (2019 13:11) (18 - 20)  SpO2: 98% (2019 13:11) (96% - 100%)  CAPILLARY BLOOD GLUCOSE      POCT Blood Glucose.: 193 mg/dL (2019 11:26)  POCT Blood Glucose.: 132 mg/dL (2019 07:54)  POCT Blood Glucose.: 151 mg/dL (2019 21:20)  POCT Blood Glucose.: 210 mg/dL (2019 16:31)       @ 07: @ 07:00  --------------------------------------------------------  IN: 900 mL / OUT: 3250 mL / NET: -2350 mL     @ 07: @ 14:46  --------------------------------------------------------  IN: 320 mL / OUT: 0 mL / NET: 320 mL        Physical Exam:    Daily     Daily Weight in k.6 (2019 08:01)  General:  NAD  HEENT:  Nonicteric, PERRLA  CV:  systolic murmur   Lungs:  CTA B/L, no wheezes, rales, rhonchi  Abdomen:  Soft, non-tender, no distended, positive BS,  Extremities:  no edema  Skin:  Warm and dry, no rashes  :  No morrison  Neuro:  AAOx3, non-focal  No Restraints    LABS:                        10.1   10.06 )-----------( 172      ( 2019 08:15 )             31.0         144  |  108  |  81<H>  ----------------------------<  144<H>  3.5   |  19<L>  |  2.76<H>    Ca    9.1      2019 05:19  Phos  4.2       Mg     1.8           PT/INR - ( 2019 08:15 )   PT: 56.4 sec;   INR: 4.72 ratio         PTT - ( 2019 07:45 )  PTT:43.2 sec        RADIOLOGY & ADDITIONAL TESTS:    ---------------------------------------------------------------------------  I personally reviewed: [  ]EKG   [  ]CXR    [  ] CT    [  ]Other  ---------------------------------------------------------------------------  PLEASE CHECK WHEN PRESENT:     [  ]Heart Failure     [  ] Acute     [  ] Acute on Chronic     [  ] Chronic  -------------------------------------------------------------------     [  ]Diastolic [HFpEF]     [  ]Systolic [HFrEF]     [  ]Combined [HFpEF & HFrEF]     [  ]Other:  -------------------------------------------------------------------  [  ]ELADIA     [  ]ATN     [  ]Reneal Medullary Necrosis     [  ]Renal Cortical Necrosis     [  ]Other Pathological Lesions:    [  ]CKD 1  [  ]CKD 2  [  ]CKD 3  [  ]CKD 4  [  ]CKD 5  [  ]Other  -------------------------------------------------------------------  [  ]Other/Unspecified:    --------------------------------------------------------------------    Abdominal Nutritional Status  [  ]Malnutrition: See Nutrition Note  [  ]Cachexia  [  ]Other:   [  ]Supplement Ordered:  [  ]Morbid Obesity (BMI >=40] Patient is a 78y old  Male who presents with a chief complaint of 3 months of progressive B/L LE oedema and dyspnoea on exertion. (2019 12:32)      INTERVAL HPI/OVERNIGHT EVENTS:  No acute overnight events, patient reports feeling well.      Heart failure  Family history of hypertension (Mother)  Handoff  MEWS Score  CAD (coronary artery disease)  Prostatitis syndrome  UTI (urinary tract infection)  Diabetes  Stented coronary artery  High cholesterol  HTN (hypertension)  Afib  CHF (congestive heart failure)  Hypernatremia  CAD (coronary artery disease)  CHF (congestive heart failure)  Severe aortic stenosis  Epistaxis  Scrotal edema  Chronic atrial fibrillation  Type 2 diabetes mellitus with other diabetic kidney complication, without long-term current use of insulin  Bradycardia  Acute on chronic systolic congestive heart failure  HTN (hypertension)  Edema  Metabolic acidosis  ELADIA (acute kidney injury)  Urethral catheterization  S/p bare metal coronary artery stent  No significant past surgical history  PEREZ 3 MONTHS  90+  ARF (acute renal failure)      Review of Systems: 12 point review of systems otherwise negative  ( - )fevers/chills  ( - ) dyspnea  ( - ) cough  ( - ) chest pain  ( - ) palpitations  ( - ) dizziness/lightheadedness  ( - ) nausea/vomiting  ( - ) abd pain  ( - ) diarrhea  ( - ) melena  ( - ) hematochezia  ( - ) dysuria  ( - ) hematuria  ( - ) leg swelling  ( -) calf tenderness  ( - ) motor weakness  ( - ) extremity numbness  ( - ) back pain  ( + ) tolerating POs  ( + ) BM    MEDICATIONS  (STANDING):  ALBUTerol/ipratropium for Nebulization 3 milliLiter(s) Nebulizer every 6 hours  amLODIPine   Tablet 5 milliGRAM(s) Oral daily  atorvastatin 40 milliGRAM(s) Oral at bedtime  clopidogrel Tablet 75 milliGRAM(s) Oral daily  dextrose 5%. 1000 milliLiter(s) (50 mL/Hr) IV Continuous <Continuous>  dextrose 50% Injectable 12.5 Gram(s) IV Push once  dextrose 50% Injectable 25 Gram(s) IV Push once  dextrose 50% Injectable 25 Gram(s) IV Push once  finasteride 5 milliGRAM(s) Oral daily  furosemide Infusion 10 mG/Hr (5 mL/Hr) IV Continuous <Continuous>  insulin glargine Injectable (LANTUS) 9 Unit(s) SubCutaneous at bedtime  insulin lispro (HumaLOG) corrective regimen sliding scale   SubCutaneous three times a day before meals  insulin lispro (HumaLOG) corrective regimen sliding scale   SubCutaneous at bedtime  nystatin Powder 1 Application(s) Topical two times a day    MEDICATIONS  (PRN):  acetaminophen   Tablet .. 650 milliGRAM(s) Oral every 6 hours PRN Moderate Pain (4 - 6)  dextrose 40% Gel 15 Gram(s) Oral once PRN Blood Glucose LESS THAN 70 milliGRAM(s)/deciliter  glucagon  Injectable 1 milliGRAM(s) IntraMuscular once PRN Glucose LESS THAN 70 milligrams/deciliter      Allergies    latex (Unknown)  Sulfacetamide Sodium (Unknown)    Intolerances          Vital Signs Last 24 Hrs  T(C): 36.5 (2019 11:44), Max: 36.5 (2019 11:44)  T(F): 97.7 (2019 11:44), Max: 97.7 (2019 11:44)  HR: 69 (2019 11:44) (64 - 73)  BP: 103/67 (2019 11:44) (103/67 - 117/70)  BP(mean): --  RR: 18 (2019 13:11) (18 - 20)  SpO2: 98% (2019 13:11) (96% - 100%)  CAPILLARY BLOOD GLUCOSE      POCT Blood Glucose.: 193 mg/dL (2019 11:26)  POCT Blood Glucose.: 132 mg/dL (2019 07:54)  POCT Blood Glucose.: 151 mg/dL (2019 21:20)  POCT Blood Glucose.: 210 mg/dL (2019 16:31)       @ 07: @ 07:00  --------------------------------------------------------  IN: 900 mL / OUT: 3250 mL / NET: -2350 mL     @ 07: @ 14:46  --------------------------------------------------------  IN: 320 mL / OUT: 0 mL / NET: 320 mL        Physical Exam:    Daily     Daily Weight in k.6 (2019 08:01)  General:  NAD  HEENT:  Nonicteric, PERRLA  CV:  systolic murmur   Lungs:  decreased bs at bases b/l  Abdomen:  Soft, non-tender, no distended  Extremities:  +1 edema  Skin:  Warm and dry, no rashes  : + morrison  scrotal edema present  Neuro:  AAOx3, non-focal  No Restraints    LABS:                        10.1   10.06 )-----------( 172      ( 2019 08:15 )             31.0         144  |  108  |  81<H>  ----------------------------<  144<H>  3.5   |  19<L>  |  2.76<H>    Ca    9.1      2019 05:19  Phos  4.2       Mg     1.8           PT/INR - ( 2019 08:15 )   PT: 56.4 sec;   INR: 4.72 ratio         PTT - ( 2019 07:45 )  PTT:43.2 sec        RADIOLOGY & ADDITIONAL TESTS:    ---------------------------------------------------------------------------  I personally reviewed: [  ]EKG   [  ]CXR    [  ] CT    [  ]Other  ---------------------------------------------------------------------------  PLEASE CHECK WHEN PRESENT:     [  ]Heart Failure     [  ] Acute     [  ] Acute on Chronic     [  ] Chronic  -------------------------------------------------------------------     [  ]Diastolic [HFpEF]     [  ]Systolic [HFrEF]     [  ]Combined [HFpEF & HFrEF]     [  ]Other:  -------------------------------------------------------------------  [  ]ELADIA     [  ]ATN     [  ]Reneal Medullary Necrosis     [  ]Renal Cortical Necrosis     [  ]Other Pathological Lesions:    [  ]CKD 1  [  ]CKD 2  [  ]CKD 3  [  ]CKD 4  [  ]CKD 5  [  ]Other  -------------------------------------------------------------------  [  ]Other/Unspecified:    --------------------------------------------------------------------    Abdominal Nutritional Status  [  ]Malnutrition: See Nutrition Note  [  ]Cachexia  [  ]Other:   [  ]Supplement Ordered:  [  ]Morbid Obesity (BMI >=40]

## 2019-01-22 NOTE — PROGRESS NOTE ADULT - PROBLEM SELECTOR PLAN 4
Stable thus far, patient preload dependent with AS and RV failure so would monitor closely while diuresing

## 2019-01-22 NOTE — PROGRESS NOTE ADULT - ATTENDING COMMENTS
I have seen this patient with the fellow and agree with their assessment and plan. In addition, pt diuresing well on lasix drip. Cont one more day and then transition to likely 80mg IV TID and eventually PO  if crt stable, can get CTA or cath for TAVR eval and space them out alteast 72hours for both tests.    Devonte Brower MD  Cell   Pager   Office

## 2019-01-22 NOTE — PROGRESS NOTE ADULT - SUBJECTIVE AND OBJECTIVE BOX
Patient seen and examined at bedside.    Overnight Events: Thinks arms are still edematous, but LEs and scrotal swelling improving.       REVIEW OF SYSTEMS:  Constitutional:     No fevers, chills, weight loss, weight gain  HEENT:                  No dry eyes, nasal congestion, postnasal drip  CV:                         No chest pain, palpitations, orthopnea, PND  Resp:                     No cough, SOB, dyspnea, wheezing, sputum  GI:                          No nausea, vomiting, abdominal pain, diarrhea, constipation  :                        No dysuria, nocturia, hematuria, increased urinary frequency  Musculoskeletal: No back pain, myalgias, arthralgias   Skin:                       No rash, pruritus, ecchymoses  Neurological:        No headache, dizziness, syncope, weakness, numbness  Psychiatric:           No anxiety, depression   Endocrine:            No hot/cold intolerance, polydipsia  Heme/Lymph:      No bleeding, easy bruising  Allergic/Immune: No itchy eyes, rhinorrhea, hives angioedema      Current Meds:  acetaminophen   Tablet .. 650 milliGRAM(s) Oral every 6 hours PRN  ALBUTerol/ipratropium for Nebulization 3 milliLiter(s) Nebulizer every 6 hours  amLODIPine   Tablet 5 milliGRAM(s) Oral daily  atorvastatin 40 milliGRAM(s) Oral at bedtime  clopidogrel Tablet 75 milliGRAM(s) Oral daily  dextrose 40% Gel 15 Gram(s) Oral once PRN  dextrose 5%. 1000 milliLiter(s) IV Continuous <Continuous>  dextrose 50% Injectable 12.5 Gram(s) IV Push once  dextrose 50% Injectable 25 Gram(s) IV Push once  dextrose 50% Injectable 25 Gram(s) IV Push once  finasteride 5 milliGRAM(s) Oral daily  furosemide Infusion 10 mG/Hr IV Continuous <Continuous>  glucagon  Injectable 1 milliGRAM(s) IntraMuscular once PRN  insulin glargine Injectable (LANTUS) 9 Unit(s) SubCutaneous at bedtime  insulin lispro (HumaLOG) corrective regimen sliding scale   SubCutaneous three times a day before meals  insulin lispro (HumaLOG) corrective regimen sliding scale   SubCutaneous at bedtime  nystatin Powder 1 Application(s) Topical two times a day      PAST MEDICAL & SURGICAL HISTORY:  CAD (coronary artery disease)  Prostatitis syndrome  UTI (urinary tract infection)  Diabetes  Stented coronary artery  High cholesterol  HTN (hypertension)  Afib  S/p bare metal coronary artery stent      Vitals:  T(F): 97.4 (01-22), Max: 97.6 (01-21)  HR: 70 (01-22) (62 - 73)  BP: 117/70 (01-22) (107/71 - 117/70)  RR: 18 (01-22)  SpO2: 96% (01-22)  I&O's Summary    21 Jan 2019 07:01  -  22 Jan 2019 07:00  --------------------------------------------------------  IN: 900 mL / OUT: 3250 mL / NET: -2350 mL    22 Jan 2019 07:01  -  22 Jan 2019 11:29  --------------------------------------------------------  IN: 200 mL / OUT: 0 mL / NET: 200 mL        Physical Exam:  Appearance: no acute distress  HEENT:   Normal oral mucosa, PERRL, EOMI	  Lymphatic: No lymphadenopathy  Cardiovascular: irregularly irregular, Normal S1 S2, 2/6 systolic murmur, anasarca - 3+ pitting edema of legs, thighs, abdomen, arms  Respiratory: crackles R>L	  Psychiatry: A & O x 3, Mood & affect appropriate  Gastrointestinal:  Soft, Non-tender, + BS	  Skin: No rashes, No ecchymoses, No cyanosis	  Neurologic: Non-focal  Extremities: Normal range of motion, No clubbing, cyanosis or edema  Vascular: Peripheral pulses palpable 2+ bilaterally                            10.1   10.06 )-----------( 172      ( 22 Jan 2019 08:15 )             31.0     01-22    144  |  108  |  81<H>  ----------------------------<  144<H>  3.5   |  19<L>  |  2.76<H>    Ca    9.1      22 Jan 2019 05:19  Phos  4.2     01-22  Mg     1.8     01-22      PT/INR - ( 22 Jan 2019 08:15 )   PT: 56.4 sec;   INR: 4.72 ratio         PTT - ( 21 Jan 2019 07:45 )  PTT:43.2 sec      Serum Pro-Brain Natriuretic Peptide: 17898 pg/mL (01-16 @ 05:49)  Serum Pro-Brain Natriuretic Peptide: 65266 pg/mL (01-15 @ 18:22)      Echo:  < from: Transthoracic Echocardiogram (01.16.19 @ 14:19) >  Dimensions:    Normal Values:  LA:     5.7    2.0 - 4.0 cm  Ao:     3.1    2.0 - 3.8 cm  SEPTUM: 1.3    0.6 - 1.2 cm  PWT:    1.2    0.6 -1.1 cm  LVIDd:  5.8    3.0 - 5.6 cm  LVIDs:  4.8    1.8 - 4.0 cm  Derived variables:  LVMI: 155 g/m2  RWT: 0.41  Fractional short: 17 %  EF (Visual Estimate): 45 %  Doppler Peak Velocity (m/sec): AoV=4.2  ------------------------------------------------------------------------  Conclusions:  1. Mitral annular calcification. Thickened and tethered  mitral valve leaflets. Moderate mitral regurgitation.  2. Calcified aortic valve with decreased opening.  Morphology is not well seen. Cannot rule out bicuspid  valve. Peak transaortic valve gradient equals 71 mm Hg,  mean transaortic valve gradient equals 38 mm Hg, estimated  aortic valve area equals 0.8 sqcm (by continuity equation),  aortic valve velocity time integral equals 99 cm,  consistent with severe aortic stenosis. Mild aortic  regurgitation.  3. Severely dilated left atrium.  LA volume index = 57  cc/m2.  4. Eccentric left ventricular hypertrophy (dilated left  ventricle with normal relative wall thickness).  5. Mild global left ventricular systolic dysfunction.  Flattening of the interventricular septum in both systole  and diastole is  consistent with right ventricular pressure  overload.  6. Right ventricular enlargement with decreased right  ventricular systolic function.  7. Normal tricuspid valve. Moderate-severe tricuspid  regurgitation.  8. Estimated pulmonary artery systolic pressure equals 71  mm Hg, assuming right atrial pressure equals 10 mm Hg,  consistent with severe pulmonary pressures.  *** No previous Echo exam.    < end of copied text >      Interpretation of Telemetry: AFib 60-70s, 6 beats WCT yesterday evening Referring: Angel; Reason: PEREZ  Patient seen and examined at bedside.    Overnight Events: Thinks arms are still edematous, but LEs and scrotal swelling improving.       REVIEW OF SYSTEMS:  Constitutional:     No fevers, chills, weight loss, weight gain  HEENT:                  No dry eyes, nasal congestion, postnasal drip  CV:                         No chest pain, palpitations, orthopnea, PND  Resp:                     No cough, SOB, dyspnea, wheezing, sputum  GI:                          No nausea, vomiting, abdominal pain, diarrhea, constipation  :                        No dysuria, nocturia, hematuria, increased urinary frequency  Musculoskeletal: No back pain, myalgias, arthralgias   Skin:                       No rash, pruritus, ecchymoses  Neurological:        No headache, dizziness, syncope, weakness, numbness  Psychiatric:           No anxiety, depression   Endocrine:            No hot/cold intolerance, polydipsia  Heme/Lymph:      No bleeding, easy bruising  Allergic/Immune: No itchy eyes, rhinorrhea, hives angioedema      Current Meds:  acetaminophen   Tablet .. 650 milliGRAM(s) Oral every 6 hours PRN  ALBUTerol/ipratropium for Nebulization 3 milliLiter(s) Nebulizer every 6 hours  amLODIPine   Tablet 5 milliGRAM(s) Oral daily  atorvastatin 40 milliGRAM(s) Oral at bedtime  clopidogrel Tablet 75 milliGRAM(s) Oral daily  dextrose 40% Gel 15 Gram(s) Oral once PRN  dextrose 5%. 1000 milliLiter(s) IV Continuous <Continuous>  dextrose 50% Injectable 12.5 Gram(s) IV Push once  dextrose 50% Injectable 25 Gram(s) IV Push once  dextrose 50% Injectable 25 Gram(s) IV Push once  finasteride 5 milliGRAM(s) Oral daily  furosemide Infusion 10 mG/Hr IV Continuous <Continuous>  glucagon  Injectable 1 milliGRAM(s) IntraMuscular once PRN  insulin glargine Injectable (LANTUS) 9 Unit(s) SubCutaneous at bedtime  insulin lispro (HumaLOG) corrective regimen sliding scale   SubCutaneous three times a day before meals  insulin lispro (HumaLOG) corrective regimen sliding scale   SubCutaneous at bedtime  nystatin Powder 1 Application(s) Topical two times a day      PAST MEDICAL & SURGICAL HISTORY:  CAD (coronary artery disease)  Prostatitis syndrome  UTI (urinary tract infection)  Diabetes  Stented coronary artery  High cholesterol  HTN (hypertension)  Afib  S/p bare metal coronary artery stent      Vitals:  T(F): 97.4 (01-22), Max: 97.6 (01-21)  HR: 70 (01-22) (62 - 73)  BP: 117/70 (01-22) (107/71 - 117/70)  RR: 18 (01-22)  SpO2: 96% (01-22)  I&O's Summary    21 Jan 2019 07:01  -  22 Jan 2019 07:00  --------------------------------------------------------  IN: 900 mL / OUT: 3250 mL / NET: -2350 mL    22 Jan 2019 07:01  -  22 Jan 2019 11:29  --------------------------------------------------------  IN: 200 mL / OUT: 0 mL / NET: 200 mL        Physical Exam:  Appearance: no acute distress  HEENT:   Normal oral mucosa, PERRL, EOMI	  Lymphatic: No lymphadenopathy  Cardiovascular: irregularly irregular, Normal S1 S2, 2/6 systolic murmur, anasarca - 3+ pitting edema of legs, thighs, abdomen, arms  Respiratory: crackles R>L	  Psychiatry: A & O x 3, Mood & affect appropriate  Gastrointestinal:  Soft, Non-tender, + BS	  Skin: No rashes, No ecchymoses, No cyanosis	  Neurologic: Non-focal  Extremities: Normal range of motion, No clubbing, cyanosis or edema  Vascular: Peripheral pulses palpable 2+ bilaterally    Labs Personally Reviewed 1/22 /2019                          10.1   10.06 )-----------( 172      ( 22 Jan 2019 08:15 )             31.0     01-22    144  |  108  |  81<H>  ----------------------------<  144<H>  3.5   |  19<L>  |  2.76<H>    Ca    9.1      22 Jan 2019 05:19  Phos  4.2     01-22  Mg     1.8     01-22      PT/INR - ( 22 Jan 2019 08:15 )   PT: 56.4 sec;   INR: 4.72 ratio         PTT - ( 21 Jan 2019 07:45 )  PTT:43.2 sec      Serum Pro-Brain Natriuretic Peptide: 72571 pg/mL (01-16 @ 05:49)  Serum Pro-Brain Natriuretic Peptide: 78070 pg/mL (01-15 @ 18:22)      Echo:  < from: Transthoracic Echocardiogram (01.16.19 @ 14:19) >  Dimensions:    Normal Values:  LA:     5.7    2.0 - 4.0 cm  Ao:     3.1    2.0 - 3.8 cm  SEPTUM: 1.3    0.6 - 1.2 cm  PWT:    1.2    0.6 -1.1 cm  LVIDd:  5.8    3.0 - 5.6 cm  LVIDs:  4.8    1.8 - 4.0 cm  Derived variables:  LVMI: 155 g/m2  RWT: 0.41  Fractional short: 17 %  EF (Visual Estimate): 45 %  Doppler Peak Velocity (m/sec): AoV=4.2  ------------------------------------------------------------------------  Conclusions:  1. Mitral annular calcification. Thickened and tethered  mitral valve leaflets. Moderate mitral regurgitation.  2. Calcified aortic valve with decreased opening.  Morphology is not well seen. Cannot rule out bicuspid  valve. Peak transaortic valve gradient equals 71 mm Hg,  mean transaortic valve gradient equals 38 mm Hg, estimated  aortic valve area equals 0.8 sqcm (by continuity equation),  aortic valve velocity time integral equals 99 cm,  consistent with severe aortic stenosis. Mild aortic  regurgitation.  3. Severely dilated left atrium.  LA volume index = 57  cc/m2.  4. Eccentric left ventricular hypertrophy (dilated left  ventricle with normal relative wall thickness).  5. Mild global left ventricular systolic dysfunction.  Flattening of the interventricular septum in both systole  and diastole is  consistent with right ventricular pressure  overload.  6. Right ventricular enlargement with decreased right  ventricular systolic function.  7. Normal tricuspid valve. Moderate-severe tricuspid  regurgitation.  8. Estimated pulmonary artery systolic pressure equals 71  mm Hg, assuming right atrial pressure equals 10 mm Hg,  consistent with severe pulmonary pressures.  *** No previous Echo exam.    < end of copied text >      Interpretation of Telemetry: AFib 60-70s, 6 beats WCT yesterday evening

## 2019-01-22 NOTE — PROGRESS NOTE ADULT - SUBJECTIVE AND OBJECTIVE BOX
[facility] Division of Kidney Diseases & Hypertension  FOLLOW UP NOTE  [phone]--------------------------------------------------------------------------------  Chief Complaint:Heart failure      24 hour events/subjective: Patient states his breathing is stable and continues to be on HFNC however on reducing O2. States he still feels swelling in the legs and some SOB. Continues to make good urine and diuresing well. No other complaints offered during exam.        PAST HISTORY  --------------------------------------------------------------------------------  No significant changes to PMH, PSH, FHx, SHx, unless otherwise noted    ALLERGIES & MEDICATIONS  --------------------------------------------------------------------------------  Allergies    latex (Unknown)  Sulfacetamide Sodium (Unknown)    Intolerances      Standing Inpatient Medications  ALBUTerol/ipratropium for Nebulization 3 milliLiter(s) Nebulizer every 6 hours  amLODIPine   Tablet 5 milliGRAM(s) Oral daily  atorvastatin 40 milliGRAM(s) Oral at bedtime  clopidogrel Tablet 75 milliGRAM(s) Oral daily  dextrose 5%. 1000 milliLiter(s) IV Continuous <Continuous>  dextrose 50% Injectable 12.5 Gram(s) IV Push once  dextrose 50% Injectable 25 Gram(s) IV Push once  dextrose 50% Injectable 25 Gram(s) IV Push once  finasteride 5 milliGRAM(s) Oral daily  furosemide Infusion 10 mG/Hr IV Continuous <Continuous>  insulin glargine Injectable (LANTUS) 9 Unit(s) SubCutaneous at bedtime  insulin lispro (HumaLOG) corrective regimen sliding scale   SubCutaneous three times a day before meals  insulin lispro (HumaLOG) corrective regimen sliding scale   SubCutaneous at bedtime  nystatin Powder 1 Application(s) Topical two times a day    PRN Inpatient Medications  acetaminophen   Tablet .. 650 milliGRAM(s) Oral every 6 hours PRN  dextrose 40% Gel 15 Gram(s) Oral once PRN  glucagon  Injectable 1 milliGRAM(s) IntraMuscular once PRN      REVIEW OF SYSTEMS  --------------------------------------------------------------------------------  Gen: No  fevers/chills  Skin: No rashes  Head/Eyes/Ears/Mouth: No headache; Normal hearing; Normal vision w/o blurriness  Respiratory: No cough, wheezing, hemoptysis  CV: No chest pain, PND, orthopnea  GI: No abdominal pain, diarrhea, constipation, nausea, vomiting  : No increased frequency, dysuria, hematuria, nocturia  MSK: No joint pain/swelling; no back pain  Neuro: No dizziness/lightheadedness, weakness, seizures, numbness, tingling      All other systems were reviewed and are negative, except as noted.    VITALS/PHYSICAL EXAM  --------------------------------------------------------------------------------  T(C): 36.5 (01-22-19 @ 11:44), Max: 36.5 (01-22-19 @ 11:44)  HR: 69 (01-22-19 @ 11:44) (62 - 73)  BP: 103/67 (01-22-19 @ 11:44) (103/67 - 117/70)  RR: 18 (01-22-19 @ 11:44) (18 - 20)  SpO2: 99% (01-22-19 @ 11:44) (96% - 100%)  Wt(kg): --        01-21-19 @ 07:01  -  01-22-19 @ 07:00  --------------------------------------------------------  IN: 900 mL / OUT: 3250 mL / NET: -2350 mL    01-22-19 @ 07:01  -  01-22-19 @ 12:32  --------------------------------------------------------  IN: 200 mL / OUT: 0 mL / NET: 200 mL      Physical Exam:  	Gen: NAD, well-appearing  	HEENT: PERRL, supple neck, clear oropharynx  	Pulm: diminished lung sounds bilaterally with some scattered crackles present.  	CV: irregularly irregular, Normal S1 S2, 2/6 systolic murmur  	Back: No spinal or CVA tenderness  	Abd: +BS, soft, nontender/nondistended  	: No suprapubic tenderness              Extremities: 2/3+ pitting edema of LE.              Neuro: No focal deficits, intact gait  	Skin: Warm, without rashes  	Vascular access: N/A    LABS/STUDIES  --------------------------------------------------------------------------------              10.1   10.06 >-----------<  172      [01-22-19 @ 08:15]              31.0     144  |  108  |  81  ----------------------------<  144      [01-22-19 @ 05:19]  3.5   |  19  |  2.76        Ca     9.1     [01-22-19 @ 05:19]      Mg     1.8     [01-22-19 @ 05:19]      Phos  4.2     [01-22-19 @ 05:19]      PT/INR: PT 56.4 , INR 4.72       [01-22-19 @ 08:15]  PTT: 43.2       [01-21-19 @ 07:45]      Creatinine Trend:  SCr 2.76 [01-22 @ 05:19]  SCr 2.74 [01-21 @ 22:27]  SCr 3.12 [01-20 @ 05:17]  SCr 3.24 [01-19 @ 06:56]  SCr 3.27 [01-18 @ 06:41]    Urinalysis - [01-16-19 @ 08:36]      Color Yellow / Appearance Slightly Turbid / SG 1.011 / pH 5.5      Gluc Negative / Ketone Negative  / Bili Negative / Urobili Negative       Blood Small / Protein Negative / Leuk Est Large / Nitrite Negative      RBC 10 /  / Hyaline 0 / Gran  / Sq Epi  / Non Sq Epi 0 / Bacteria Negative    Urine Creatinine 28      [01-16-19 @ 02:23]  Urine Protein 17      [01-16-19 @ 02:23]  Urine Albumin 7.1      [01-16-19 @ 02:23]  Urine Sodium 86      [01-16-19 @ 03:47]  Urine Osmolality 290      [01-16-19 @ 08:36]    HbA1c 5.6      [01-16-19 @ 08:24]

## 2019-01-23 DIAGNOSIS — E87.6 HYPOKALEMIA: ICD-10-CM

## 2019-01-23 DIAGNOSIS — I25.10 ATHEROSCLEROTIC HEART DISEASE OF NATIVE CORONARY ARTERY WITHOUT ANGINA PECTORIS: ICD-10-CM

## 2019-01-23 LAB
ANION GAP SERPL CALC-SCNC: 14 MMOL/L — SIGNIFICANT CHANGE UP (ref 5–17)
ANION GAP SERPL CALC-SCNC: 15 MMOL/L — SIGNIFICANT CHANGE UP (ref 5–17)
BUN SERPL-MCNC: 74 MG/DL — HIGH (ref 7–23)
BUN SERPL-MCNC: 76 MG/DL — HIGH (ref 7–23)
CALCIUM SERPL-MCNC: 8.7 MG/DL — SIGNIFICANT CHANGE UP (ref 8.4–10.5)
CALCIUM SERPL-MCNC: 8.9 MG/DL — SIGNIFICANT CHANGE UP (ref 8.4–10.5)
CHLORIDE SERPL-SCNC: 109 MMOL/L — HIGH (ref 96–108)
CHLORIDE SERPL-SCNC: 109 MMOL/L — HIGH (ref 96–108)
CO2 SERPL-SCNC: 19 MMOL/L — LOW (ref 22–31)
CO2 SERPL-SCNC: 20 MMOL/L — LOW (ref 22–31)
CREAT SERPL-MCNC: 2.27 MG/DL — HIGH (ref 0.5–1.3)
CREAT SERPL-MCNC: 2.35 MG/DL — HIGH (ref 0.5–1.3)
GLUCOSE SERPL-MCNC: 123 MG/DL — HIGH (ref 70–99)
GLUCOSE SERPL-MCNC: 146 MG/DL — HIGH (ref 70–99)
HCT VFR BLD CALC: 32.7 % — LOW (ref 39–50)
HGB BLD-MCNC: 10.7 G/DL — LOW (ref 13–17)
INR BLD: 5.57 RATIO — CRITICAL HIGH (ref 0.88–1.16)
MAGNESIUM SERPL-MCNC: 1.7 MG/DL — SIGNIFICANT CHANGE UP (ref 1.6–2.6)
MCHC RBC-ENTMCNC: 28.7 PG — SIGNIFICANT CHANGE UP (ref 27–34)
MCHC RBC-ENTMCNC: 32.7 GM/DL — SIGNIFICANT CHANGE UP (ref 32–36)
MCV RBC AUTO: 87.7 FL — SIGNIFICANT CHANGE UP (ref 80–100)
PHOSPHATE SERPL-MCNC: 3.7 MG/DL — SIGNIFICANT CHANGE UP (ref 2.5–4.5)
PLATELET # BLD AUTO: 183 K/UL — SIGNIFICANT CHANGE UP (ref 150–400)
POTASSIUM SERPL-MCNC: 2.9 MMOL/L — CRITICAL LOW (ref 3.5–5.3)
POTASSIUM SERPL-MCNC: 3.2 MMOL/L — LOW (ref 3.5–5.3)
POTASSIUM SERPL-SCNC: 2.9 MMOL/L — CRITICAL LOW (ref 3.5–5.3)
POTASSIUM SERPL-SCNC: 3.2 MMOL/L — LOW (ref 3.5–5.3)
PROTHROM AB SERPL-ACNC: 66.9 SEC — HIGH (ref 10–13.1)
RBC # BLD: 3.73 M/UL — LOW (ref 4.2–5.8)
RBC # FLD: 22.4 % — HIGH (ref 10.3–14.5)
SODIUM SERPL-SCNC: 142 MMOL/L — SIGNIFICANT CHANGE UP (ref 135–145)
SODIUM SERPL-SCNC: 144 MMOL/L — SIGNIFICANT CHANGE UP (ref 135–145)
WBC # BLD: 10.48 K/UL — SIGNIFICANT CHANGE UP (ref 3.8–10.5)
WBC # FLD AUTO: 10.48 K/UL — SIGNIFICANT CHANGE UP (ref 3.8–10.5)

## 2019-01-23 PROCEDURE — 99232 SBSQ HOSP IP/OBS MODERATE 35: CPT | Mod: GC

## 2019-01-23 PROCEDURE — 99233 SBSQ HOSP IP/OBS HIGH 50: CPT | Mod: GC

## 2019-01-23 PROCEDURE — 99233 SBSQ HOSP IP/OBS HIGH 50: CPT

## 2019-01-23 RX ORDER — POTASSIUM CHLORIDE 20 MEQ
10 PACKET (EA) ORAL
Qty: 0 | Refills: 0 | Status: COMPLETED | OUTPATIENT
Start: 2019-01-23 | End: 2019-01-23

## 2019-01-23 RX ORDER — POTASSIUM CHLORIDE 20 MEQ
40 PACKET (EA) ORAL ONCE
Qty: 0 | Refills: 0 | Status: COMPLETED | OUTPATIENT
Start: 2019-01-23 | End: 2019-01-23

## 2019-01-23 RX ORDER — POTASSIUM CHLORIDE 20 MEQ
40 PACKET (EA) ORAL EVERY 4 HOURS
Qty: 0 | Refills: 0 | Status: COMPLETED | OUTPATIENT
Start: 2019-01-23 | End: 2019-01-23

## 2019-01-23 RX ORDER — MAGNESIUM SULFATE 500 MG/ML
2 VIAL (ML) INJECTION ONCE
Qty: 0 | Refills: 0 | Status: COMPLETED | OUTPATIENT
Start: 2019-01-23 | End: 2019-01-23

## 2019-01-23 RX ORDER — POTASSIUM CHLORIDE 20 MEQ
40 PACKET (EA) ORAL EVERY 4 HOURS
Qty: 0 | Refills: 0 | Status: DISCONTINUED | OUTPATIENT
Start: 2019-01-23 | End: 2019-01-23

## 2019-01-23 RX ADMIN — AMLODIPINE BESYLATE 5 MILLIGRAM(S): 2.5 TABLET ORAL at 05:26

## 2019-01-23 RX ADMIN — Medication 100 MILLIEQUIVALENT(S): at 07:05

## 2019-01-23 RX ADMIN — Medication 3 MILLILITER(S): at 17:55

## 2019-01-23 RX ADMIN — Medication 3 MILLILITER(S): at 13:13

## 2019-01-23 RX ADMIN — Medication 100 MILLIEQUIVALENT(S): at 08:05

## 2019-01-23 RX ADMIN — NYSTATIN CREAM 1 APPLICATION(S): 100000 CREAM TOPICAL at 17:54

## 2019-01-23 RX ADMIN — FINASTERIDE 5 MILLIGRAM(S): 5 TABLET, FILM COATED ORAL at 13:13

## 2019-01-23 RX ADMIN — Medication 1: at 17:10

## 2019-01-23 RX ADMIN — INSULIN GLARGINE 9 UNIT(S): 100 INJECTION, SOLUTION SUBCUTANEOUS at 22:41

## 2019-01-23 RX ADMIN — CLOPIDOGREL BISULFATE 75 MILLIGRAM(S): 75 TABLET, FILM COATED ORAL at 13:13

## 2019-01-23 RX ADMIN — Medication 50 GRAM(S): at 13:13

## 2019-01-23 RX ADMIN — Medication 40 MILLIEQUIVALENT(S): at 18:31

## 2019-01-23 RX ADMIN — Medication 3 MILLILITER(S): at 05:26

## 2019-01-23 RX ADMIN — Medication 5 MG/HR: at 13:13

## 2019-01-23 RX ADMIN — Medication 40 MILLIEQUIVALENT(S): at 07:05

## 2019-01-23 RX ADMIN — Medication 100 MILLIEQUIVALENT(S): at 10:58

## 2019-01-23 RX ADMIN — ATORVASTATIN CALCIUM 40 MILLIGRAM(S): 80 TABLET, FILM COATED ORAL at 22:41

## 2019-01-23 RX ADMIN — NYSTATIN CREAM 1 APPLICATION(S): 100000 CREAM TOPICAL at 05:26

## 2019-01-23 RX ADMIN — Medication 40 MILLIEQUIVALENT(S): at 22:41

## 2019-01-23 NOTE — PROGRESS NOTE ADULT - ATTENDING COMMENTS
Doing well on Lasix gtt.  No need to transition to IV dosing at this time.  High K requirements; frequent BMP checks

## 2019-01-23 NOTE — PROGRESS NOTE ADULT - SUBJECTIVE AND OBJECTIVE BOX
Patient is a 78y old  Male who presents with a chief complaint of 3 months of progressive B/L LE oedema and dyspnoea on exertion. (2019 12:10)      INTERVAL HPI/OVERNIGHT EVENTS:  no overnight events        Heart failure  Family history of hypertension (Mother)  Handoff  MEWS Score  CAD (coronary artery disease)  Prostatitis syndrome  UTI (urinary tract infection)  Diabetes  Stented coronary artery  High cholesterol  HTN (hypertension)  Afib  CHF (congestive heart failure)  Acute respiratory failure with hypoxia  Hypernatremia  CAD (coronary artery disease)  CHF (congestive heart failure)  Severe aortic stenosis  Epistaxis  Scrotal edema  Chronic atrial fibrillation  Type 2 diabetes mellitus with other diabetic kidney complication, without long-term current use of insulin  Bradycardia  Acute on chronic systolic congestive heart failure  HTN (hypertension)  Edema  Metabolic acidosis  ELADIA (acute kidney injury)  Urethral catheterization  S/p bare metal coronary artery stent  No significant past surgical history  PEREZ 3 MONTHS  90+  ARF (acute renal failure)      Review of Systems: 12 point review of systems otherwise negative  ( - )fevers/chills  ( - ) dyspnea  ( - ) cough  ( - ) chest pain  ( - ) palpitations  ( - ) dizziness/lightheadedness  ( - ) nausea/vomiting  ( - ) abd pain  ( - ) diarrhea  ( - ) melena  ( - ) hematochezia  ( - ) dysuria  ( - ) hematuria  ( - ) leg swelling  ( -) calf tenderness  ( - ) motor weakness  ( - ) extremity numbness  ( - ) back pain  ( + ) tolerating POs  ( + ) BM    MEDICATIONS  (STANDING):  ALBUTerol/ipratropium for Nebulization 3 milliLiter(s) Nebulizer every 6 hours  amLODIPine   Tablet 5 milliGRAM(s) Oral daily  atorvastatin 40 milliGRAM(s) Oral at bedtime  clopidogrel Tablet 75 milliGRAM(s) Oral daily  dextrose 5%. 1000 milliLiter(s) (50 mL/Hr) IV Continuous <Continuous>  dextrose 50% Injectable 12.5 Gram(s) IV Push once  dextrose 50% Injectable 25 Gram(s) IV Push once  dextrose 50% Injectable 25 Gram(s) IV Push once  finasteride 5 milliGRAM(s) Oral daily  furosemide Infusion 10 mG/Hr (5 mL/Hr) IV Continuous <Continuous>  insulin glargine Injectable (LANTUS) 9 Unit(s) SubCutaneous at bedtime  insulin lispro (HumaLOG) corrective regimen sliding scale   SubCutaneous three times a day before meals  insulin lispro (HumaLOG) corrective regimen sliding scale   SubCutaneous at bedtime  magnesium sulfate  IVPB 2 Gram(s) IV Intermittent once  nystatin Powder 1 Application(s) Topical two times a day    MEDICATIONS  (PRN):  acetaminophen   Tablet .. 650 milliGRAM(s) Oral every 6 hours PRN Moderate Pain (4 - 6)  dextrose 40% Gel 15 Gram(s) Oral once PRN Blood Glucose LESS THAN 70 milliGRAM(s)/deciliter  glucagon  Injectable 1 milliGRAM(s) IntraMuscular once PRN Glucose LESS THAN 70 milligrams/deciliter      Allergies    latex (Unknown)  Sulfacetamide Sodium (Unknown)    Intolerances          Vital Signs Last 24 Hrs  T(C): 36.3 (2019 11:31), Max: 36.7 (2019 21:21)  T(F): 97.4 (2019 11:31), Max: 98 (2019 21:21)  HR: 72 (2019 11:31) (67 - 85)  BP: 116/78 (2019 11:31) (113/78 - 122/81)  BP(mean): --  RR: 18 (2019 11:31) (18 - 20)  SpO2: 97% (2019 11:31) (96% - 100%)  CAPILLARY BLOOD GLUCOSE      POCT Blood Glucose.: 141 mg/dL (2019 11:40)  POCT Blood Glucose.: 125 mg/dL (2019 07:43)  POCT Blood Glucose.: 166 mg/dL (2019 22:00)  POCT Blood Glucose.: 237 mg/dL (2019 16:42)       @ 07: @ 07:00  --------------------------------------------------------  IN: 680 mL / OUT: 3000 mL / NET: -2320 mL     @ 07: @ 12:18  --------------------------------------------------------  IN: 240 mL / OUT: 0 mL / NET: 240 mL        Physical Exam:    Daily     Daily Weight in k.5 (2019 07:29)  General:  NAD  HEENT:  Nonicteric, PERRLA  CV:  RRR, systolic murmur   Lungs:  CTA B/L, no wheezes  Abdomen:  Soft, non-tender, no distended,  Extremities:  +2 edema  Skin:  Warm and dry, no rashes  :  + Porter   Neuro:  AAOx3, non-focal  No Restraints    LABS:                        10.7   10.48 )-----------( 183      ( 2019 08:04 )             32.7         144  |  109<H>  |  76<H>  ----------------------------<  123<H>  2.9<LL>   |  20<L>  |  2.35<H>    Ca    8.9      2019 06:05  Phos  3.7       Mg     1.7           PT/INR - ( 2019 07:54 )   PT: 66.9 sec;   INR: 5.57 ratio                 RADIOLOGY & ADDITIONAL TESTS:    ---------------------------------------------------------------------------  I personally reviewed: [  ]EKG   [  ]CXR    [  ] CT    [  ]Other  ---------------------------------------------------------------------------  PLEASE CHECK WHEN PRESENT:     [  ]Heart Failure     [  ] Acute     [  ] Acute on Chronic     [  ] Chronic  -------------------------------------------------------------------     [  ]Diastolic [HFpEF]     [  ]Systolic [HFrEF]     [  ]Combined [HFpEF & HFrEF]     [  ]Other:  -------------------------------------------------------------------  [  ]ELADIA     [  ]ATN     [  Renal Medullary Necrosis     [  ]Renal Cortical Necrosis     [  ]Other Pathological Lesions:    [  ]CKD 1  [  ]CKD 2  [  ]CKD 3  [  ]CKD 4  [  ]CKD 5  [  ]Other  -------------------------------------------------------------------  [  ]Other/Unspecified:    --------------------------------------------------------------------    Abdominal Nutritional Status  [  ]Malnutrition: See Nutrition Note  [  ]Cachexia  [  ]Other:   [  ]Supplement Ordered:  [  ]Morbid Obesity (BMI >=40]

## 2019-01-23 NOTE — PROGRESS NOTE ADULT - PROBLEM SELECTOR PLAN 1
Cont on lasix gtt @10mg/hr - will look towards transitioning to IV soon   echo with EF 45 % with severe AS with right sided heart failure  Daily weights strict I+Os.   cont ASA, Plavix.

## 2019-01-23 NOTE — PROGRESS NOTE ADULT - SUBJECTIVE AND OBJECTIVE BOX
HPI/Interval Hx    Sitting up in bed, TAVR eval on hold pending renal clearance and improvement in CHF. Remains orthopneic.     MEDICATIONS  (STANDING):  ALBUTerol/ipratropium for Nebulization 3 milliLiter(s) Nebulizer every 6 hours  amLODIPine   Tablet 5 milliGRAM(s) Oral daily  atorvastatin 40 milliGRAM(s) Oral at bedtime  clopidogrel Tablet 75 milliGRAM(s) Oral daily  dextrose 5%. 1000 milliLiter(s) (50 mL/Hr) IV Continuous <Continuous>  dextrose 50% Injectable 12.5 Gram(s) IV Push once  dextrose 50% Injectable 25 Gram(s) IV Push once  dextrose 50% Injectable 25 Gram(s) IV Push once  finasteride 5 milliGRAM(s) Oral daily  furosemide Infusion 10 mG/Hr (5 mL/Hr) IV Continuous <Continuous>  insulin glargine Injectable (LANTUS) 9 Unit(s) SubCutaneous at bedtime  insulin lispro (HumaLOG) corrective regimen sliding scale   SubCutaneous three times a day before meals  insulin lispro (HumaLOG) corrective regimen sliding scale   SubCutaneous at bedtime  magnesium sulfate  IVPB 2 Gram(s) IV Intermittent once  nystatin Powder 1 Application(s) Topical two times a day    MEDICATIONS  (PRN):  acetaminophen   Tablet .. 650 milliGRAM(s) Oral every 6 hours PRN Moderate Pain (4 - 6)  dextrose 40% Gel 15 Gram(s) Oral once PRN Blood Glucose LESS THAN 70 milliGRAM(s)/deciliter  glucagon  Injectable 1 milliGRAM(s) IntraMuscular once PRN Glucose LESS THAN 70 milligrams/deciliter      PAST MEDICAL & SURGICAL HISTORY:  CAD (coronary artery disease)  Prostatitis syndrome  UTI (urinary tract infection)  Diabetes  Stented coronary artery  High cholesterol  HTN (hypertension)  Afib  S/p bare metal coronary artery stent      Review of Systems  CONSTITUTIONAL: No fevers or chills, (+) fatigue  EYES/ENT: No visual changes;  No vertigo or throat pain   NECK: No pain or stiffness  RESPIRATORY: No cough, wheezing, hemoptysis; (+) SOB  CARDIOVASCULAR: No chest pain or palpitations, (+) orthopnea, (+) exertional dyspnea, (+) LE edema  GASTROINTESTINAL: No abdominal or epigastric pain. No nausea, vomiting, or hematemesis; No diarrhea or constipation. No melena or hematochezia.  GENITOURINARY: No dysuria, frequency or hematuria  NEUROLOGICAL: No numbness or weakness  SKIN: No itching, burning, rashes, or lesions   All other review of systems is negative unless indicated above.    Physical Exam  General: A/ox 3, No acute Distress  Neck: Supple, NO JVD  Cardiac: S1 S2, III/VI LUSB murmur  Pulmonary: CTAB, Breathing unlabored, (+) bibasilar crackles, (+) diminished at bases  Abdomen: Soft, Non -tender, +BS x 4 quads  Extremities: No Rashes, (+) BLE edema  Neuro: A/o x 3, No focal deficits  Vital Signs Last 24 Hrs  T(C): 36.3 (23 Jan 2019 11:31), Max: 36.7 (22 Jan 2019 21:21)  T(F): 97.4 (23 Jan 2019 11:31), Max: 98 (22 Jan 2019 21:21)  HR: 72 (23 Jan 2019 11:31) (67 - 85)  BP: 116/78 (23 Jan 2019 11:31) (113/78 - 122/81)  BP(mean): --  RR: 18 (23 Jan 2019 11:31) (18 - 20)  SpO2: 97% (23 Jan 2019 11:31) (96% - 100%)                        10.7   10.48 )-----------( 183      ( 23 Jan 2019 08:04 )             32.7     01-23    144  |  109<H>  |  76<H>  ----------------------------<  123<H>  2.9<LL>   |  20<L>  |  2.35<H>    Ca    8.9      23 Jan 2019 06:05  Phos  3.7     01-23  Mg     1.7     01-23        Telemetry: AFib 60-80    Other  < from: Transthoracic Echocardiogram (01.16.19 @ 14:19) >  Patient name: JOSUÉ RAMIREZ  YOB: 1940   Age: 78 (M)   MR#: [id number]  Study Date: 1/16/2019  Location: [address] [place], [place] [id number]: Rosenda Crowley CLARITA  Study quality: Technically fair  Referring Physician: Charlie Ibarra MD  Blood Pressure: 110/67 mmHg  Height: 178 cm  Weight: 84 kg  BSA: 2 m2  ------------------------------------------------------------------------  PROCEDURE: Transthoracic echocardiogram with 2-D, M-Mode  and complete spectral and color flow Doppler.  INDICATION: Nonrheumatic mitral (valve) stenosis (I34.2)  ------------------------------------------------------------------------  Dimensions:    Normal Values:  LA:     5.7    2.0 - 4.0 cm  Ao:     3.1    2.0 - 3.8 cm  SEPTUM: 1.3    0.6 - 1.2 cm  PWT:    1.2    0.6 -1.1 cm  LVIDd:  5.8    3.0 - 5.6 cm  LVIDs:  4.8    1.8 - 4.0 cm  Derived variables:  LVMI: 155 g/m2  RWT: 0.41  Fractional short: 17 %  EF (Visual Estimate): 45 %  Doppler Peak Velocity (m/sec): AoV=4.2  ------------------------------------------------------------------------  Observations:  Mitral Valve: Mitral annular calcification. Thickened and  tethered mitral valve leaflets. Moderate mitral  regurgitation.  Aortic Valve/Aorta: Calcified aortic valve with decreased  opening. Morphology is not well seen. Cannot rule out  bicuspid valve. Peak transaortic valve gradient equals 71  mm Hg, mean transaortic valve gradient equals 38 mm Hg,  estimated aortic valve area equals 0.8 sqcm (by continuity  equation), aortic valve velocity time integral equals 99  cm, consistent with severe aortic stenosis. Mild aortic  regurgitation.  Peak left ventricular outflow tract  gradient equals 4 mm Hg, mean gradient is equal to 2 mm Hg,  LVOT velocity time integral equals 25 cm.  Aortic Root: 3.1 cm.  LVOT diameter: 2.1 cm.  Left Atrium: Severely dilated left atrium.  LA volume index  = 57 cc/m2.  Left Ventricle: Mild global left ventricular systolic  dysfunction. Flattening of the interventricular septum in  both systole and diastole is  consistentwith right  ventricular pressure overload. Eccentric left ventricular  hypertrophy (dilated left ventricle with normal relative  wall thickness). Normal diastolic function  Right Heart: Moderate right atrial enlargement. Right  ventricular enlargement with decreased right ventricular  systolic function. Normal tricuspid valve. Moderate-severe  tricuspid regurgitation. Normal pulmonic valve. Mild  pulmonic regurgitation.  Pericardium/Pleura: Normal pericardium with no pericardial  effusion.  Hemodynamic: Estimated right ventricular systolic pressure  equals 71 mm Hg, assuming right atrial pressure equals 10  mm Hg, consistent with severe pulmonary hypertension.  ------------------------------------------------------------------------    < end of copied text >

## 2019-01-23 NOTE — PROGRESS NOTE ADULT - PROBLEM SELECTOR PLAN 2
PCI in 2017, ASA discontinued, plan to continue Plavix/Coumadin  will need repeat right/left heart cath, INR is supratherapeutic, Coumadin on hold.

## 2019-01-23 NOTE — PROGRESS NOTE ADULT - ATTENDING COMMENTS
I have seen this patient with the fellow and agree with their assessment and plan. In addition, diuresed well. Ok to proceed with TAVR eval with spaced out CTA and cath and d/w with TAVR team.   Can decrease lasix to Q8 hours 80mg IV if ok with cards    Devonte Brower MD  Cell   Pager   Office

## 2019-01-23 NOTE — PROGRESS NOTE ADULT - SUBJECTIVE AND OBJECTIVE BOX
[facility] Division of Kidney Diseases & Hypertension  FOLLOW UP NOTE  [phone]--------------------------------------------------------------------------------  Chief Complaint:Heart failure      24 hour events/subjective: Patient off HFNC and appears much more comfortable in bed. Creatinine continues to decline and patient making excellent urine. Offers no other complaints during exam.         PAST HISTORY  --------------------------------------------------------------------------------  No significant changes to PMH, PSH, FHx, SHx, unless otherwise noted    ALLERGIES & MEDICATIONS  --------------------------------------------------------------------------------  Allergies    latex (Unknown)  Sulfacetamide Sodium (Unknown)    Intolerances      Standing Inpatient Medications  ALBUTerol/ipratropium for Nebulization 3 milliLiter(s) Nebulizer every 6 hours  amLODIPine   Tablet 5 milliGRAM(s) Oral daily  atorvastatin 40 milliGRAM(s) Oral at bedtime  clopidogrel Tablet 75 milliGRAM(s) Oral daily  dextrose 5%. 1000 milliLiter(s) IV Continuous <Continuous>  dextrose 50% Injectable 12.5 Gram(s) IV Push once  dextrose 50% Injectable 25 Gram(s) IV Push once  dextrose 50% Injectable 25 Gram(s) IV Push once  finasteride 5 milliGRAM(s) Oral daily  furosemide Infusion 10 mG/Hr IV Continuous <Continuous>  insulin glargine Injectable (LANTUS) 9 Unit(s) SubCutaneous at bedtime  insulin lispro (HumaLOG) corrective regimen sliding scale   SubCutaneous three times a day before meals  insulin lispro (HumaLOG) corrective regimen sliding scale   SubCutaneous at bedtime  magnesium sulfate  IVPB 2 Gram(s) IV Intermittent once  nystatin Powder 1 Application(s) Topical two times a day    PRN Inpatient Medications  acetaminophen   Tablet .. 650 milliGRAM(s) Oral every 6 hours PRN  dextrose 40% Gel 15 Gram(s) Oral once PRN  glucagon  Injectable 1 milliGRAM(s) IntraMuscular once PRN      REVIEW OF SYSTEMS  --------------------------------------------------------------------------------  Gen: No  fevers/chills  Skin: No rashes  Head/Eyes/Ears/Mouth: No headache; Normal hearing; Normal vision w/o blurriness  Respiratory: No dyspnea, cough, wheezing, hemoptysis  CV: No chest pain, PND, orthopnea  GI: No abdominal pain, diarrhea, constipation, nausea, vomiting  : No increased frequency, dysuria, hematuria, nocturia  MSK: No joint pain/swelling; no back pain; no edema  Neuro: No dizziness/lightheadedness, weakness, seizures, numbness, tingling      All other systems were reviewed and are negative, except as noted.    VITALS/PHYSICAL EXAM  --------------------------------------------------------------------------------  T(C): 36.3 (01-23-19 @ 11:31), Max: 36.7 (01-22-19 @ 21:21)  HR: 72 (01-23-19 @ 11:31) (67 - 85)  BP: 116/78 (01-23-19 @ 11:31) (113/78 - 122/81)  RR: 18 (01-23-19 @ 11:31) (18 - 20)  SpO2: 97% (01-23-19 @ 11:31) (96% - 100%)  Wt(kg): --        01-22-19 @ 07:01  -  01-23-19 @ 07:00  --------------------------------------------------------  IN: 680 mL / OUT: 3000 mL / NET: -2320 mL    01-23-19 @ 07:01  -  01-23-19 @ 12:11  --------------------------------------------------------  IN: 240 mL / OUT: 0 mL / NET: 240 mL      Physical Exam:  	Gen: NAD, well-appearing  	HEENT: PERRL, supple neck, clear oropharynx  	Pulm: CTA B/L  	CV: RRR, S1S2;  	Back: No spinal or CVA tenderness  	Abd: +BS, soft, nontender/nondistended  	: No suprapubic tenderness              Extremities: 2+ pitting edema of LE.              Neuro: No focal deficits, intact gait  	Skin: Warm, without rashes  	Vascular access: N/A    LABS/STUDIES  --------------------------------------------------------------------------------              10.7   10.48 >-----------<  183      [01-23-19 @ 08:04]              32.7     144  |  109  |  76  ----------------------------<  123      [01-23-19 @ 06:05]  2.9   |  20  |  2.35        Ca     8.9     [01-23-19 @ 06:05]      Mg     1.7     [01-23-19 @ 06:05]      Phos  3.7     [01-23-19 @ 06:05]      PT/INR: PT 66.9 , INR 5.57       [01-23-19 @ 07:54]      Creatinine Trend:  SCr 2.35 [01-23 @ 06:05]  SCr 2.76 [01-22 @ 05:19]  SCr 2.74 [01-21 @ 22:27]  SCr 3.12 [01-20 @ 05:17]  SCr 3.24 [01-19 @ 06:56]

## 2019-01-23 NOTE — PROGRESS NOTE ADULT - PROBLEM SELECTOR PLAN 1
TAVR eval on hold pending clinical improvement  Remains on Lasix gtt, exertional dyspnea and orthopnea persist  Will need Cardiac CT and right/left heart cath to complete TAVR evaluation.  Renal function improving, will reassess tomorrow AM for possible CT  Timing of TAVR TBD pending completion of testing and review of all imaging by structural team.

## 2019-01-23 NOTE — PROGRESS NOTE ADULT - SUBJECTIVE AND OBJECTIVE BOX
Referring: Dr. Hale    Reason for consult: decompensated HF    Patient seen and examined at bedside.    Overnight Events: Feels that breathing has improved, denies CP, palpitations. Edema slowly improving      REVIEW OF SYSTEMS:  Constitutional:     No fevers, chills, weight loss, weight gain  HEENT:                  No dry eyes, nasal congestion, postnasal drip  CV:                         No chest pain, palpitations, orthopnea, PND  Resp:                     No cough, SOB, dyspnea, wheezing, sputum  GI:                          No nausea, vomiting, abdominal pain, diarrhea, constipation  :                        No dysuria, nocturia, hematuria, increased urinary frequency  Musculoskeletal: No back pain, myalgias, arthralgias   Skin:                       No rash, pruritus, ecchymoses  Neurological:        No headache, dizziness, syncope, weakness, numbness  Psychiatric:           No anxiety, depression   Endocrine:            No hot/cold intolerance, polydipsia  Heme/Lymph:      No bleeding, easy bruising  Allergic/Immune: No itchy eyes, rhinorrhea, hives angioedema      Current Meds:  acetaminophen   Tablet .. 650 milliGRAM(s) Oral every 6 hours PRN  ALBUTerol/ipratropium for Nebulization 3 milliLiter(s) Nebulizer every 6 hours  amLODIPine   Tablet 5 milliGRAM(s) Oral daily  atorvastatin 40 milliGRAM(s) Oral at bedtime  clopidogrel Tablet 75 milliGRAM(s) Oral daily  dextrose 40% Gel 15 Gram(s) Oral once PRN  dextrose 5%. 1000 milliLiter(s) IV Continuous <Continuous>  dextrose 50% Injectable 12.5 Gram(s) IV Push once  dextrose 50% Injectable 25 Gram(s) IV Push once  dextrose 50% Injectable 25 Gram(s) IV Push once  finasteride 5 milliGRAM(s) Oral daily  furosemide Infusion 10 mG/Hr IV Continuous <Continuous>  glucagon  Injectable 1 milliGRAM(s) IntraMuscular once PRN  insulin glargine Injectable (LANTUS) 9 Unit(s) SubCutaneous at bedtime  insulin lispro (HumaLOG) corrective regimen sliding scale   SubCutaneous three times a day before meals  insulin lispro (HumaLOG) corrective regimen sliding scale   SubCutaneous at bedtime  nystatin Powder 1 Application(s) Topical two times a day  potassium chloride  10 mEq/100 mL IVPB 10 milliEquivalent(s) IV Intermittent every 1 hour      PAST MEDICAL & SURGICAL HISTORY:  CAD (coronary artery disease)  Prostatitis syndrome  UTI (urinary tract infection)  Diabetes  Stented coronary artery  High cholesterol  HTN (hypertension)  Afib  S/p bare metal coronary artery stent      Vitals:  T(F): 97.3 (01-23), Max: 98 (01-22)  HR: 67 (01-23) (67 - 85)  BP: 122/81 (01-23) (103/67 - 122/81)  RR: 18 (01-23)  SpO2: 98% (01-23)  I&O's Summary    22 Jan 2019 07:01  -  23 Jan 2019 07:00  --------------------------------------------------------  IN: 680 mL / OUT: 3000 mL / NET: -2320 mL        Physical Exam:  Appearance: No acute distress; well appearing  Eyes: PERRL, EOMI, pink conjunctiva  HENT: Normal oral mucosa  Cardiovascular: irregularly irregular, S1, S2, 2/6 systolic murmur, continues to have pitting edema of arms, legs, abdomen  Respiratory: diminished in bases  Gastrointestinal: soft, non-tender, non-distended with normal bowel sounds  Musculoskeletal: No clubbing; no joint deformity   Neurologic: Non-focal  Lymphatic: No lymphadenopathy  Psychiatry: AAOx3, mood & affect appropriate  Skin: No rashes, ecchymoses, or cyanosis                          10.1   10.06 )-----------( 172      ( 22 Jan 2019 08:15 )             31.0     01-23    144  |  109<H>  |  76<H>  ----------------------------<  123<H>  2.9<LL>   |  20<L>  |  2.35<H>    Ca    8.9      23 Jan 2019 06:05  Phos  3.7     01-23  Mg     1.7     01-23      PT/INR - ( 22 Jan 2019 08:15 )   PT: 56.4 sec;   INR: 4.72 ratio           Echo:  < from: Transthoracic Echocardiogram (01.16.19 @ 14:19) >  Dimensions:    Normal Values:  LA:     5.7    2.0 - 4.0 cm  Ao:     3.1    2.0 - 3.8 cm  SEPTUM: 1.3    0.6 - 1.2 cm  PWT:    1.2    0.6 -1.1 cm  LVIDd:  5.8    3.0 - 5.6 cm  LVIDs:  4.8    1.8 - 4.0 cm  Derived variables:  LVMI: 155 g/m2  RWT: 0.41  Fractional short: 17 %  EF (Visual Estimate): 45 %  Doppler Peak Velocity (m/sec): AoV=4.2  ------------------------------------------------------------------------  Conclusions:  1. Mitral annular calcification. Thickened and tethered  mitral valve leaflets. Moderate mitral regurgitation.  2. Calcified aortic valve with decreased opening.  Morphology is not well seen. Cannot rule out bicuspid  valve. Peak transaortic valve gradient equals 71 mm Hg,  mean transaortic valve gradient equals 38 mm Hg, estimated  aortic valve area equals 0.8 sqcm (by continuity equation),  aortic valve velocity time integral equals 99 cm,  consistent with severe aortic stenosis. Mild aortic  regurgitation.  3. Severely dilated left atrium.  LA volume index = 57  cc/m2.  4. Eccentric left ventricular hypertrophy (dilated left  ventricle with normal relative wall thickness).  5. Mild global left ventricular systolic dysfunction.  Flattening of the interventricular septum in both systole  and diastole is  consistent with right ventricular pressure  overload.  6. Right ventricular enlargement with decreased right  ventricular systolic function.  7. Normal tricuspid valve. Moderate-severe tricuspid  regurgitation.  8. Estimated pulmonary artery systolic pressure equals 71  mm Hg, assuming right atrial pressure equals 10 mm Hg,  consistent with severe pulmonary pressures.  *** No previous Echo exam.    < end of copied text >    Interpretation of Telemetry: AFib 50-90s

## 2019-01-24 DIAGNOSIS — N17.9 ACUTE KIDNEY FAILURE, UNSPECIFIED: ICD-10-CM

## 2019-01-24 LAB
ANION GAP SERPL CALC-SCNC: 13 MMOL/L — SIGNIFICANT CHANGE UP (ref 5–17)
ANION GAP SERPL CALC-SCNC: 16 MMOL/L — SIGNIFICANT CHANGE UP (ref 5–17)
BUN SERPL-MCNC: 72 MG/DL — HIGH (ref 7–23)
BUN SERPL-MCNC: 75 MG/DL — HIGH (ref 7–23)
CALCIUM SERPL-MCNC: 8.8 MG/DL — SIGNIFICANT CHANGE UP (ref 8.4–10.5)
CALCIUM SERPL-MCNC: 9.3 MG/DL — SIGNIFICANT CHANGE UP (ref 8.4–10.5)
CHLORIDE SERPL-SCNC: 107 MMOL/L — SIGNIFICANT CHANGE UP (ref 96–108)
CHLORIDE SERPL-SCNC: 109 MMOL/L — HIGH (ref 96–108)
CO2 SERPL-SCNC: 19 MMOL/L — LOW (ref 22–31)
CO2 SERPL-SCNC: 19 MMOL/L — LOW (ref 22–31)
CREAT SERPL-MCNC: 2.2 MG/DL — HIGH (ref 0.5–1.3)
CREAT SERPL-MCNC: 2.24 MG/DL — HIGH (ref 0.5–1.3)
GLUCOSE SERPL-MCNC: 119 MG/DL — HIGH (ref 70–99)
GLUCOSE SERPL-MCNC: 87 MG/DL — SIGNIFICANT CHANGE UP (ref 70–99)
HCT VFR BLD CALC: 26.7 % — LOW (ref 39–50)
HGB BLD-MCNC: 9.4 G/DL — LOW (ref 13–17)
INR BLD: 6.74 RATIO — CRITICAL HIGH (ref 0.88–1.16)
MAGNESIUM SERPL-MCNC: 1.8 MG/DL — SIGNIFICANT CHANGE UP (ref 1.6–2.6)
MCHC RBC-ENTMCNC: 30.5 PG — SIGNIFICANT CHANGE UP (ref 27–34)
MCHC RBC-ENTMCNC: 35.2 GM/DL — SIGNIFICANT CHANGE UP (ref 32–36)
MCV RBC AUTO: 86.7 FL — SIGNIFICANT CHANGE UP (ref 80–100)
PHOSPHATE SERPL-MCNC: 3.2 MG/DL — SIGNIFICANT CHANGE UP (ref 2.5–4.5)
PLATELET # BLD AUTO: 188 K/UL — SIGNIFICANT CHANGE UP (ref 150–400)
POTASSIUM SERPL-MCNC: 3.5 MMOL/L — SIGNIFICANT CHANGE UP (ref 3.5–5.3)
POTASSIUM SERPL-MCNC: 3.8 MMOL/L — SIGNIFICANT CHANGE UP (ref 3.5–5.3)
POTASSIUM SERPL-SCNC: 3.5 MMOL/L — SIGNIFICANT CHANGE UP (ref 3.5–5.3)
POTASSIUM SERPL-SCNC: 3.8 MMOL/L — SIGNIFICANT CHANGE UP (ref 3.5–5.3)
PROTHROM AB SERPL-ACNC: 82.2 SEC — HIGH (ref 10–12.9)
RBC # BLD: 3.08 M/UL — LOW (ref 4.2–5.8)
RBC # FLD: 22.1 % — HIGH (ref 10.3–14.5)
SODIUM SERPL-SCNC: 141 MMOL/L — SIGNIFICANT CHANGE UP (ref 135–145)
SODIUM SERPL-SCNC: 142 MMOL/L — SIGNIFICANT CHANGE UP (ref 135–145)
WBC # BLD: 14.17 K/UL — HIGH (ref 3.8–10.5)
WBC # FLD AUTO: 14.17 K/UL — HIGH (ref 3.8–10.5)

## 2019-01-24 PROCEDURE — 75572 CT HRT W/3D IMAGE: CPT | Mod: 26

## 2019-01-24 PROCEDURE — 93880 EXTRACRANIAL BILAT STUDY: CPT | Mod: 26

## 2019-01-24 PROCEDURE — 99232 SBSQ HOSP IP/OBS MODERATE 35: CPT

## 2019-01-24 PROCEDURE — 99233 SBSQ HOSP IP/OBS HIGH 50: CPT

## 2019-01-24 PROCEDURE — 94010 BREATHING CAPACITY TEST: CPT | Mod: 26

## 2019-01-24 PROCEDURE — 99233 SBSQ HOSP IP/OBS HIGH 50: CPT | Mod: GC

## 2019-01-24 RX ORDER — MAGNESIUM SULFATE 500 MG/ML
2 VIAL (ML) INJECTION ONCE
Qty: 0 | Refills: 0 | Status: COMPLETED | OUTPATIENT
Start: 2019-01-24 | End: 2019-01-24

## 2019-01-24 RX ORDER — POTASSIUM CHLORIDE 20 MEQ
40 PACKET (EA) ORAL EVERY 4 HOURS
Qty: 0 | Refills: 0 | Status: COMPLETED | OUTPATIENT
Start: 2019-01-24 | End: 2019-01-24

## 2019-01-24 RX ORDER — POTASSIUM CHLORIDE 20 MEQ
20 PACKET (EA) ORAL ONCE
Qty: 0 | Refills: 0 | Status: COMPLETED | OUTPATIENT
Start: 2019-01-24 | End: 2019-01-24

## 2019-01-24 RX ORDER — FUROSEMIDE 40 MG
80 TABLET ORAL EVERY 8 HOURS
Qty: 0 | Refills: 0 | Status: DISCONTINUED | OUTPATIENT
Start: 2019-01-24 | End: 2019-01-25

## 2019-01-24 RX ORDER — FUROSEMIDE 40 MG
80 TABLET ORAL EVERY 8 HOURS
Qty: 0 | Refills: 0 | Status: DISCONTINUED | OUTPATIENT
Start: 2019-01-24 | End: 2019-01-24

## 2019-01-24 RX ADMIN — Medication 50 GRAM(S): at 11:19

## 2019-01-24 RX ADMIN — Medication 80 MILLIGRAM(S): at 11:19

## 2019-01-24 RX ADMIN — Medication 80 MILLIGRAM(S): at 22:03

## 2019-01-24 RX ADMIN — AMLODIPINE BESYLATE 5 MILLIGRAM(S): 2.5 TABLET ORAL at 06:02

## 2019-01-24 RX ADMIN — INSULIN GLARGINE 9 UNIT(S): 100 INJECTION, SOLUTION SUBCUTANEOUS at 22:03

## 2019-01-24 RX ADMIN — Medication 3 MILLILITER(S): at 17:14

## 2019-01-24 RX ADMIN — Medication 650 MILLIGRAM(S): at 22:03

## 2019-01-24 RX ADMIN — Medication 650 MILLIGRAM(S): at 03:54

## 2019-01-24 RX ADMIN — Medication 3 MILLILITER(S): at 22:05

## 2019-01-24 RX ADMIN — Medication 40 MILLIEQUIVALENT(S): at 11:20

## 2019-01-24 RX ADMIN — NYSTATIN CREAM 1 APPLICATION(S): 100000 CREAM TOPICAL at 17:14

## 2019-01-24 RX ADMIN — NYSTATIN CREAM 1 APPLICATION(S): 100000 CREAM TOPICAL at 06:02

## 2019-01-24 RX ADMIN — Medication 3 MILLILITER(S): at 11:20

## 2019-01-24 RX ADMIN — CLOPIDOGREL BISULFATE 75 MILLIGRAM(S): 75 TABLET, FILM COATED ORAL at 11:19

## 2019-01-24 RX ADMIN — Medication 40 MILLIEQUIVALENT(S): at 17:14

## 2019-01-24 RX ADMIN — Medication 3 MILLILITER(S): at 06:02

## 2019-01-24 RX ADMIN — Medication 20 MILLIEQUIVALENT(S): at 22:03

## 2019-01-24 RX ADMIN — FINASTERIDE 5 MILLIGRAM(S): 5 TABLET, FILM COATED ORAL at 11:19

## 2019-01-24 RX ADMIN — Medication 650 MILLIGRAM(S): at 04:24

## 2019-01-24 RX ADMIN — ATORVASTATIN CALCIUM 40 MILLIGRAM(S): 80 TABLET, FILM COATED ORAL at 22:03

## 2019-01-24 NOTE — CHART NOTE - NSCHARTNOTEFT_GEN_A_CORE
Nutrition Follow Up Note  Patient seen for: nutrition follow-up on 4MON     Chart reviewed, events noted. This is a 78M w/ PMHx of CAD (s/p DAYANA to mLAD and POBA to D2 2017), AF (on Warfarin), DM2, HLD, HTN who was admitted on 1/15 w/ HF symptoms found to have severe AS, mod MR, severe pHTN and RV dysfunction.  Currently being diuresed and worked up for a TAVR. Per chart likely pt will discharge to rehab and return for TAVR.     Source: patient, medical record     Diet : carbohydrate consistent + 1200ml fluid restriction + DASH + no concentrated potassium & phosphorus     Patient appeared tired during interview, unaware of time of day, reports some dizziness s/p CT today. Reports fair to good PO intake, denies any acute GI distress. States last BM x 1-2 days ago (no BM documented in chart). Defer diet education reinforcement at this time given pt appeared tired.      PO intake : variable PO intake, overall % of meals however pt noted with poor PO intake x 1 day () appears to be improving      Source for PO intake: pt, nursing flow-sheet      Enteral /Parenteral Nutrition: N/A    Weights:   Daily Weight in k.7 (), Weight in k.5 (), Weight in k.6 (), Weight in k.9 (), Weight in k.8 (), Weight in k.7 (), Weight in k.9 ()  weight change likely related to fluids, being duiresed     Pertinent Medications: MEDICATIONS  (STANDING):  ALBUTerol/ipratropium for Nebulization 3 milliLiter(s) Nebulizer every 6 hours  amLODIPine   Tablet 5 milliGRAM(s) Oral daily  atorvastatin 40 milliGRAM(s) Oral at bedtime  clopidogrel Tablet 75 milliGRAM(s) Oral daily  dextrose 5%. 1000 milliLiter(s) (50 mL/Hr) IV Continuous <Continuous>  dextrose 50% Injectable 12.5 Gram(s) IV Push once  dextrose 50% Injectable 25 Gram(s) IV Push once  dextrose 50% Injectable 25 Gram(s) IV Push once  finasteride 5 milliGRAM(s) Oral daily  furosemide   Injectable 80 milliGRAM(s) IV Push every 8 hours  insulin glargine Injectable (LANTUS) 9 Unit(s) SubCutaneous at bedtime  insulin lispro (HumaLOG) corrective regimen sliding scale   SubCutaneous three times a day before meals  insulin lispro (HumaLOG) corrective regimen sliding scale   SubCutaneous at bedtime  nystatin Powder 1 Application(s) Topical two times a day  potassium chloride    Tablet ER 40 milliEquivalent(s) Oral every 4 hours    MEDICATIONS  (PRN):  acetaminophen   Tablet .. 650 milliGRAM(s) Oral every 6 hours PRN Moderate Pain (4 - 6)  dextrose 40% Gel 15 Gram(s) Oral once PRN Blood Glucose LESS THAN 70 milliGRAM(s)/deciliter  glucagon  Injectable 1 milliGRAM(s) IntraMuscular once PRN Glucose LESS THAN 70 milligrams/deciliter    Pertinent Labs:  @ 05:41: Na 141, BUN 72<H>, Cr 2.24<H>, BG 87, K+ 3.5, Phos 3.2, Mg 1.8, Alk Phos --, ALT/SGPT --, AST/SGOT --, HbA1c --   @ 16:19: Na 142, BUN 74<H>, Cr 2.27<H>, <H>, K+ 3.2<L>, Phos --, Mg --, Alk Phos --, ALT/SGPT --, AST/SGOT --, HbA1c --    Finger Sticks:  POCT Blood Glucose.: 118 mg/dL ( @ 11:31)  POCT Blood Glucose.: 89 mg/dL ( @ 07:56)  POCT Blood Glucose.: 160 mg/dL ( @ 21:46)  POCT Blood Glucose.: 193 mg/dL ( @ 16:56)      Skin per nursing documentation: free of pressure injuries   Edema: +2 Right/left arm, + 3 right/left leg, +4 scrotum     Estimated Needs:   [x ] no change since previous assessment  [ ] recalculated:     Previous Nutrition Diagnosis:  Limited adherence to nutrition - related recommendations  Nutrition Diagnosis is: on going     New Nutrition Diagnosis: N/A       Interventions:   Recommend  1) Continue current diet as tolerated. May remove potassium/phosphorus restrictions given electrolytes WNL  2) RD to add diet Health Shake 1x per day supplement PO intake.   3) Obtain/honor food preferences as able   4) Provide diet education as needed /requested.     Monitoring and Evaluation:     Continue to monitor Nutritional intake, Tolerance to diet prescription, weights, labs, skin integrity    RD remains available upon request and will follow up per protocol  Sravanthi Hyatt RD, CDN, Pager # [phone]

## 2019-01-24 NOTE — PROGRESS NOTE ADULT - PROBLEM SELECTOR PLAN 1
TAVR evaluation in progress  Lasix gtt discontinued, to be started on 80mg IV TID, now able to lay flat  will still need right/left heart cath, tentatively planned for Monday pending review of renal function  Timing of TAVR TBD pending review of all imaging by structural team, anticipate discharge to rehab with return for TAVR

## 2019-01-24 NOTE — PROGRESS NOTE ADULT - PROBLEM SELECTOR PLAN 3
Likely secondary to CHF exacerbation  - Patient with L and R sided heart failure with severe AS by valve size and dilated LA along with severe pHTN likely secondary to heart failure. Will likely require intervention for valve and continued diuresis to help overall functional status and for resolution of kidney function  -continue lasix for now, appreciate TAVR eval.  -plan for LHC or CTA tomorrow and space out for 72 hours with each test. Best time to do it as crt stable and less overloaded

## 2019-01-24 NOTE — PROGRESS NOTE ADULT - SUBJECTIVE AND OBJECTIVE BOX
Patient seen and examined at bedside.    Overnight Events: Breathing improved. Continues to have edema. Denies CP, palpitations.       REVIEW OF SYSTEMS:  Constitutional:     No fevers, chills, weight loss, weight gain  HEENT:                  No dry eyes, nasal congestion, postnasal drip  CV:                         No chest pain, palpitations, orthopnea, PND  Resp:                     No cough, SOB, dyspnea, wheezing, sputum  GI:                          No nausea, vomiting, abdominal pain, diarrhea, constipation  :                        No dysuria, nocturia, hematuria, increased urinary frequency  Musculoskeletal: No back pain, myalgias, arthralgias   Skin:                       No rash, pruritus, ecchymoses  Neurological:        No headache, dizziness, syncope, weakness, numbness  Psychiatric:           No anxiety, depression   Endocrine:            No hot/cold intolerance, polydipsia  Heme/Lymph:      No bleeding, easy bruising  Allergic/Immune: No itchy eyes, rhinorrhea, hives angioedema      Current Meds:  acetaminophen   Tablet .. 650 milliGRAM(s) Oral every 6 hours PRN  ALBUTerol/ipratropium for Nebulization 3 milliLiter(s) Nebulizer every 6 hours  amLODIPine   Tablet 5 milliGRAM(s) Oral daily  atorvastatin 40 milliGRAM(s) Oral at bedtime  clopidogrel Tablet 75 milliGRAM(s) Oral daily  dextrose 40% Gel 15 Gram(s) Oral once PRN  dextrose 5%. 1000 milliLiter(s) IV Continuous <Continuous>  dextrose 50% Injectable 12.5 Gram(s) IV Push once  dextrose 50% Injectable 25 Gram(s) IV Push once  dextrose 50% Injectable 25 Gram(s) IV Push once  finasteride 5 milliGRAM(s) Oral daily  furosemide   Injectable 80 milliGRAM(s) IV Push every 8 hours  glucagon  Injectable 1 milliGRAM(s) IntraMuscular once PRN  insulin glargine Injectable (LANTUS) 9 Unit(s) SubCutaneous at bedtime  insulin lispro (HumaLOG) corrective regimen sliding scale   SubCutaneous three times a day before meals  insulin lispro (HumaLOG) corrective regimen sliding scale   SubCutaneous at bedtime  nystatin Powder 1 Application(s) Topical two times a day  potassium chloride    Tablet ER 40 milliEquivalent(s) Oral every 4 hours      PAST MEDICAL & SURGICAL HISTORY:  CAD (coronary artery disease)  Prostatitis syndrome  UTI (urinary tract infection)  Diabetes  Stented coronary artery  High cholesterol  HTN (hypertension)  Afib  S/p bare metal coronary artery stent      Vitals:  T(F): 97.2 (01-24), Max: 98.2 (01-24)  HR: 75 (01-24) (74 - 84)  BP: 101/66 (01-24) (101/66 - 119/77)  RR: 18 (01-24)  SpO2: 95% (01-24)  I&O's Summary    23 Jan 2019 07:01  -  24 Jan 2019 07:00  --------------------------------------------------------  IN: 780 mL / OUT: 2800 mL / NET: -2020 mL        Physical Exam:  Appearance: No acute distress; well appearing  Eyes: PERRL, EOMI, pink conjunctiva  HENT: Normal oral mucosa  Cardiovascular: irregularly irregular, S1, S2, 2/6 systolic murmur, continues to have pitting edema of arms, legs, abdomen  Respiratory: diminished in bases  Gastrointestinal: soft, non-tender, non-distended with normal bowel sounds  Musculoskeletal: No clubbing; no joint deformity   Neurologic: Non-focal  Lymphatic: No lymphadenopathy  Psychiatry: AAOx3, mood & affect appropriate  Skin: No rashes, ecchymoses, or cyanosis                            9.4    14.17 )-----------( 188      ( 24 Jan 2019 07:08 )             26.7     01-24    141  |  109<H>  |  72<H>  ----------------------------<  87  3.5   |  19<L>  |  2.24<H>    Ca    8.8      24 Jan 2019 05:41  Phos  3.2     01-24  Mg     1.8     01-24      PT/INR - ( 23 Jan 2019 07:54 )   PT: 66.9 sec;   INR: 5.57 ratio             Interpretation of Telemetry: AFib

## 2019-01-24 NOTE — PROGRESS NOTE ADULT - SUBJECTIVE AND OBJECTIVE BOX
HPI/Interval Hx    Sitting up in bed, remains on O2 via NC. No acute events, states "I'm breathing OK"    MEDICATIONS  (STANDING):  ALBUTerol/ipratropium for Nebulization 3 milliLiter(s) Nebulizer every 6 hours  amLODIPine   Tablet 5 milliGRAM(s) Oral daily  atorvastatin 40 milliGRAM(s) Oral at bedtime  clopidogrel Tablet 75 milliGRAM(s) Oral daily  dextrose 5%. 1000 milliLiter(s) (50 mL/Hr) IV Continuous <Continuous>  dextrose 50% Injectable 12.5 Gram(s) IV Push once  dextrose 50% Injectable 25 Gram(s) IV Push once  dextrose 50% Injectable 25 Gram(s) IV Push once  finasteride 5 milliGRAM(s) Oral daily  insulin glargine Injectable (LANTUS) 9 Unit(s) SubCutaneous at bedtime  insulin lispro (HumaLOG) corrective regimen sliding scale   SubCutaneous three times a day before meals  insulin lispro (HumaLOG) corrective regimen sliding scale   SubCutaneous at bedtime  nystatin Powder 1 Application(s) Topical two times a day    MEDICATIONS  (PRN):  acetaminophen   Tablet .. 650 milliGRAM(s) Oral every 6 hours PRN Moderate Pain (4 - 6)  dextrose 40% Gel 15 Gram(s) Oral once PRN Blood Glucose LESS THAN 70 milliGRAM(s)/deciliter  glucagon  Injectable 1 milliGRAM(s) IntraMuscular once PRN Glucose LESS THAN 70 milligrams/deciliter      PAST MEDICAL & SURGICAL HISTORY:  CAD (coronary artery disease)  Prostatitis syndrome  UTI (urinary tract infection)  Diabetes  Stented coronary artery  High cholesterol  HTN (hypertension)  Afib  S/p bare metal coronary artery stent      Review of Systems  CONSTITUTIONAL: No fevers or chills, (+) generalized weakness and fatigue  EYES/ENT: No visual changes;  No vertigo or throat pain   NECK: No pain or stiffness  RESPIRATORY: No cough, wheezing, hemoptysis; No shortness of breath at rest  CARDIOVASCULAR: No chest pain or palpitations, PND, orthopnea, or dizziness, (+) exertional dyspnea and LE edema  GASTROINTESTINAL: No abdominal or epigastric pain. No nausea, vomiting, or hematemesis; No diarrhea or constipation. No melena or hematochezia.  GENITOURINARY: No dysuria, frequency or hematuria  NEUROLOGICAL: No numbness or weakness  SKIN: No itching, burning, rashes, or lesions   All other review of systems is negative unless indicated above.    Physical Exam  General: A/ox 3, No acute Distress  Neck: Supple, NO JVD  Cardiac: S1 S2, III/VI RUSB/LUSB murmur  Pulmonary: CTAB, Breathing unlabored, No Rhonchi/Rales/Wheezing  Abdomen: Soft, Non -tender, +BS x 4 quads  Extremities: No Rashes, (+) BLE edema  Neuro: A/o x 3, No focal deficits  Vital Signs Last 24 Hrs  T(C): 36.8 (24 Jan 2019 04:24), Max: 36.8 (24 Jan 2019 04:24)  T(F): 98.2 (24 Jan 2019 04:24), Max: 98.2 (24 Jan 2019 04:24)  HR: 84 (24 Jan 2019 04:24) (72 - 84)  BP: 116/77 (24 Jan 2019 04:24) (116/77 - 119/77)  BP(mean): --  RR: 18 (24 Jan 2019 04:24) (18 - 18)  SpO2: 93% (24 Jan 2019 04:24) (93% - 97%)                        9.4    14.17 )-----------( 188      ( 24 Jan 2019 07:08 )             26.7     01-24    141  |  109<H>  |  72<H>  ----------------------------<  87  3.5   |  19<L>  |  2.24<H>    Ca    8.8      24 Jan 2019 05:41  Phos  3.2     01-24  Mg     1.8     01-24        Telemetry: AFib 70's    EKG :< from: 12 Lead ECG (01.17.19 @ 20:32) >  Ventricular Rate 82 BPM    Atrial Rate 82 BPM    QRS Duration 108 ms    Q-T Interval 372 ms    QTC Calculation(Bezet) 434 ms    R Axis -51 degrees    T Axis 88 degrees    Diagnosis Line ACCELERATED JUNCTIONAL RHYTHM  LEFT AXIS DEVIATION  RIGHT BUNDLE BRANCH BLOCK  INFERIOR INFARCT , AGE UNDETERMINED  ABNORMAL ECG    Confirmed by MARIA G DE LA ROSA ADAM ([id number]) on 1/18/2019 7:13:28 PM    < end of copied text >      Other  < from: Transthoracic Echocardiogram (01.16.19 @ 14:19) >  Patient name: JOSUÉ RAMIREZ  YOB: 1940   Age: 78 (M)   MR#: [id number]  Study Date: 1/16/2019  Location: [address] [place], [place] [id number]: Rosenda Crowley [facility]  Study quality: Technically fair  Referring Physician: Charlie Ibarra MD  Blood Pressure: 110/67 mmHg  Height: 178 cm  Weight: 84 kg  BSA: 2 m2  ------------------------------------------------------------------------  PROCEDURE: Transthoracic echocardiogram with 2-D, M-Mode  and complete spectral and color flow Doppler.  INDICATION: Nonrheumatic mitral (valve) stenosis (I34.2)  ------------------------------------------------------------------------  Dimensions:    Normal Values:  LA:     5.7    2.0 - 4.0 cm  Ao:     3.1    2.0 - 3.8 cm  SEPTUM: 1.3    0.6 - 1.2 cm  PWT:    1.2    0.6 -1.1 cm  LVIDd:  5.8    3.0 - 5.6 cm  LVIDs:  4.8    1.8 - 4.0 cm  Derived variables:  LVMI: 155 g/m2  RWT: 0.41  Fractional short: 17 %  EF (Visual Estimate): 45 %  Doppler Peak Velocity (m/sec): AoV=4.2  ------------------------------------------------------------------------  Observations:  Mitral Valve: Mitral annular calcification. Thickened and  tethered mitral valve leaflets. Moderate mitral  regurgitation.  Aortic Valve/Aorta: Calcified aortic valve with decreased  opening. Morphology is not well seen. Cannot rule out  bicuspid valve. Peak transaortic valve gradient equals 71  mm Hg, mean transaortic valve gradient equals 38 mm Hg,  estimated aortic valve area equals 0.8 sqcm (by continuity  equation), aortic valve velocity time integral equals 99  cm, consistent with severe aortic stenosis. Mild aortic  regurgitation.  Peak left ventricular outflow tract  gradient equals 4 mm Hg, mean gradient is equal to 2 mm Hg,  LVOT velocity time integral equals 25 cm.  Aortic Root: 3.1 cm.  LVOT diameter: 2.1 cm.  Left Atrium: Severely dilated left atrium.  LA volume index  = 57 cc/m2.  Left Ventricle: Mild global left ventricular systolic  dysfunction. Flattening of the interventricular septum in  both systole and diastole is  consistentwith right  ventricular pressure overload. Eccentric left ventricular  hypertrophy (dilated left ventricle with normal relative  wall thickness). Normal diastolic function  Right Heart: Moderate right atrial enlargement. Right  ventricular enlargement with decreased right ventricular  systolic function. Normal tricuspid valve. Moderate-severe  tricuspid regurgitation. Normal pulmonic valve. Mild  pulmonic regurgitation.  Pericardium/Pleura: Normal pericardium with no pericardial  effusion.  Hemodynamic: Estimated right ventricular systolic pressure  equals 71 mm Hg, assuming right atrial pressure equals 10  mm Hg, consistent with severe pulmonary hypertension.  ------------------------------------------------------------------------    < end of copied text >

## 2019-01-24 NOTE — PROGRESS NOTE ADULT - PROBLEM SELECTOR PLAN 2
Discussed with Dr. Brower, will have CT today, space contrast studies by 72 hours.    Discussed with Dr. Hale    [id number]

## 2019-01-24 NOTE — PROGRESS NOTE ADULT - PROBLEM SELECTOR PLAN 1
Likely cardiorenal in nature and Crt improving with lasix drip  Cont decongestion via drip for few more days  Can change to 80mg IV TID lasix

## 2019-01-24 NOTE — PROGRESS NOTE ADULT - ATTENDING COMMENTS
The Structural Heart team was asked to evaluate Mr Vega for consideration for TAVR in the setting of severe AS and acute on chronic systolic heart failure (likely underlying chronic ICM with CAD and prior PCI).  He was admitted with epistaxis (in the setting of a supratherapeutic INR) and severely decompensated CHF (weeping edema).  He is sedentary and homebound, noting that he has not left his home in 3+ months prior to admission--poor functional status is well known to correlate to poor outcomes post TAVR, which is of significant concern in his case.  He has CKD and had a cardiac CT today, which we will review.  If his renal funciton allows, he is tentatively being considered for angiography on Monday.  Given his poor functional status, I would consider doing a BAV at the time of his angiogram.  If TAVR is to be considered, he would likely require some time in rehab prior to proceeding.  In my opinion, he is severely debilitated and deconditioned and would do poorly if we were to proceed with TAVR at this time (i.e. this admission).  A BAV would also allow us to assess for functional improvement prior to proceeding with TAVR.  He has severe TR and profound JVD on exam, which is also well known to correlate to poor outcomes post TAVR.  Ariel Castillo MD

## 2019-01-24 NOTE — CHART NOTE - NSCHARTNOTEFT_GEN_A_CORE
ORION Downing notified PA that INR this afternoon was 6.74. Pt seen and assessed at bedside.   Patient exhibits no signs or symptoms of bleeding. Remains hemodynamically stable. H/H stable.   Continue to hold coumadin at this time.   Assess in AM if need for vitamin K if INR continues to trend up.     Discussed with Dr. Hale, who agrees with assessment and plan.     Trina Rodriguez PA-C   Dept of Medicine   Spectra: [phone]

## 2019-01-24 NOTE — PROGRESS NOTE ADULT - SUBJECTIVE AND OBJECTIVE BOX
[facility] DIVISION OF KIDNEY DISEASES AND HYPERTENSION -- FOLLOW UP NOTE  --------------------------------------------------------------------------------  Chief Complaint: ELADIA    24 hour events/subjective:  diuresing well      PAST HISTORY  --------------------------------------------------------------------------------  No significant changes to PMH, PSH, FHx, SHx, unless otherwise noted    ALLERGIES & MEDICATIONS  --------------------------------------------------------------------------------  Allergies    latex (Unknown)  Sulfacetamide Sodium (Unknown)    Intolerances      Standing Inpatient Medications  ALBUTerol/ipratropium for Nebulization 3 milliLiter(s) Nebulizer every 6 hours  amLODIPine   Tablet 5 milliGRAM(s) Oral daily  atorvastatin 40 milliGRAM(s) Oral at bedtime  clopidogrel Tablet 75 milliGRAM(s) Oral daily  dextrose 5%. 1000 milliLiter(s) IV Continuous <Continuous>  dextrose 50% Injectable 12.5 Gram(s) IV Push once  dextrose 50% Injectable 25 Gram(s) IV Push once  dextrose 50% Injectable 25 Gram(s) IV Push once  finasteride 5 milliGRAM(s) Oral daily  furosemide   Injectable 80 milliGRAM(s) IV Push every 8 hours  insulin glargine Injectable (LANTUS) 9 Unit(s) SubCutaneous at bedtime  insulin lispro (HumaLOG) corrective regimen sliding scale   SubCutaneous three times a day before meals  insulin lispro (HumaLOG) corrective regimen sliding scale   SubCutaneous at bedtime  nystatin Powder 1 Application(s) Topical two times a day  potassium chloride    Tablet ER 40 milliEquivalent(s) Oral every 4 hours    PRN Inpatient Medications  acetaminophen   Tablet .. 650 milliGRAM(s) Oral every 6 hours PRN  dextrose 40% Gel 15 Gram(s) Oral once PRN  glucagon  Injectable 1 milliGRAM(s) IntraMuscular once PRN      Gen: No weight changes, fatigue, fevers/chills, weakness  Head/Eyes/Ears/Mouth: No headache; Normal hearing; Normal vision    Respiratory: No dyspnea, cough, wheezing, hemoptysis  CV: No chest pain, PND, orthopnea  GI: No abdominal pain, diarrhea, constipation, nausea, vomiting, melena, hematochezia  : No increased frequency, dysuria, hematuria, nocturia  MSK: No joint pain/swelling; no back pain; no edema   Heme: No easy bruising or bleeding  All other systems were reviewed and are negative, except as noted.      VITALS/PHYSICAL EXAM  --------------------------------------------------------------------------------  T(C): 36.2 (01-24-19 @ 10:55), Max: 36.8 (01-24-19 @ 04:24)  HR: 75 (01-24-19 @ 10:55) (74 - 84)  BP: 101/66 (01-24-19 @ 10:55) (101/66 - 119/77)  RR: 18 (01-24-19 @ 10:55) (18 - 18)  SpO2: 95% (01-24-19 @ 10:55) (93% - 96%)  Wt(kg): --        01-23-19 @ 07:01  -  01-24-19 @ 07:00  --------------------------------------------------------  IN: 780 mL / OUT: 2800 mL / NET: -2020 mL    01-24-19 @ 07:01  -  01-24-19 @ 15:21  --------------------------------------------------------  IN: 480 mL / OUT: 1100 mL / NET: -620 mL      PHYSICAL EXAM: vital signs as above  in no apparent distress  Neck: Supple, + JVP 9mm  Lungs: + crackles at bases  CVS: S1 S2 no M/R/G  Abdomen: no tenderness, no organomegaly, BS present  Neuro: Grossly intact  Skin: warm, dry  Ext: no cyanosis or clubbing,     LABS/STUDIES  --------------------------------------------------------------------------------              9.4    14.17 >-----------<  188      [01-24-19 @ 07:08]              26.7     141  |  109  |  72  ----------------------------<  87      [01-24-19 @ 05:41]  3.5   |  19  |  2.24        Ca     8.8     [01-24-19 @ 05:41]      Mg     1.8     [01-24-19 @ 05:41]      Phos  3.2     [01-24-19 @ 05:41]      PT/INR: PT 66.9 , INR 5.57       [01-23-19 @ 07:54]      Creatinine Trend:  SCr 2.24 [01-24 @ 05:41]  SCr 2.27 [01-23 @ 16:19]  SCr 2.35 [01-23 @ 06:05]  SCr 2.76 [01-22 @ 05:19]  SCr 2.74 [01-21 @ 22:27]    Urinalysis - [01-16-19 @ 08:36]      Color Yellow / Appearance Slightly Turbid / SG 1.011 / pH 5.5      Gluc Negative / Ketone Negative  / Bili Negative / Urobili Negative       Blood Small / Protein Negative / Leuk Est Large / Nitrite Negative      RBC 10 /  / Hyaline 0 / Gran  / Sq Epi  / Non Sq Epi 0 / Bacteria Negative      HbA1c 5.6      [01-16-19 @ 08:24]

## 2019-01-25 DIAGNOSIS — R79.1 ABNORMAL COAGULATION PROFILE: ICD-10-CM

## 2019-01-25 LAB
ANION GAP SERPL CALC-SCNC: 14 MMOL/L — SIGNIFICANT CHANGE UP (ref 5–17)
BUN SERPL-MCNC: 83 MG/DL — HIGH (ref 7–23)
CALCIUM SERPL-MCNC: 9.2 MG/DL — SIGNIFICANT CHANGE UP (ref 8.4–10.5)
CHLORIDE SERPL-SCNC: 109 MMOL/L — HIGH (ref 96–108)
CO2 SERPL-SCNC: 19 MMOL/L — LOW (ref 22–31)
CREAT SERPL-MCNC: 2.35 MG/DL — HIGH (ref 0.5–1.3)
GLUCOSE SERPL-MCNC: 93 MG/DL — SIGNIFICANT CHANGE UP (ref 70–99)
HAV IGM SER-ACNC: SIGNIFICANT CHANGE UP
HBV CORE IGM SER-ACNC: SIGNIFICANT CHANGE UP
HBV SURFACE AG SER-ACNC: SIGNIFICANT CHANGE UP
HCV AB S/CO SERPL IA: 0.06 S/CO — SIGNIFICANT CHANGE UP
HCV AB SERPL-IMP: SIGNIFICANT CHANGE UP
INR BLD: 7.68 RATIO — CRITICAL HIGH (ref 0.88–1.16)
MAGNESIUM SERPL-MCNC: 2.3 MG/DL — SIGNIFICANT CHANGE UP (ref 1.6–2.6)
PHOSPHATE SERPL-MCNC: 3.9 MG/DL — SIGNIFICANT CHANGE UP (ref 2.5–4.5)
POTASSIUM SERPL-MCNC: 3.7 MMOL/L — SIGNIFICANT CHANGE UP (ref 3.5–5.3)
POTASSIUM SERPL-SCNC: 3.7 MMOL/L — SIGNIFICANT CHANGE UP (ref 3.5–5.3)
PROTHROM AB SERPL-ACNC: 93.3 SEC — HIGH (ref 10–13.1)
SODIUM SERPL-SCNC: 142 MMOL/L — SIGNIFICANT CHANGE UP (ref 135–145)

## 2019-01-25 PROCEDURE — 99231 SBSQ HOSP IP/OBS SF/LOW 25: CPT

## 2019-01-25 PROCEDURE — 76700 US EXAM ABDOM COMPLETE: CPT | Mod: 26

## 2019-01-25 PROCEDURE — 99232 SBSQ HOSP IP/OBS MODERATE 35: CPT | Mod: GC

## 2019-01-25 PROCEDURE — 99233 SBSQ HOSP IP/OBS HIGH 50: CPT | Mod: GC

## 2019-01-25 PROCEDURE — 99233 SBSQ HOSP IP/OBS HIGH 50: CPT

## 2019-01-25 RX ORDER — FUROSEMIDE 40 MG
80 TABLET ORAL EVERY 12 HOURS
Qty: 0 | Refills: 0 | Status: DISCONTINUED | OUTPATIENT
Start: 2019-01-25 | End: 2019-01-25

## 2019-01-25 RX ORDER — SODIUM CHLORIDE 9 MG/ML
1000 INJECTION INTRAMUSCULAR; INTRAVENOUS; SUBCUTANEOUS
Qty: 0 | Refills: 0 | Status: DISCONTINUED | OUTPATIENT
Start: 2019-01-25 | End: 2019-01-25

## 2019-01-25 RX ORDER — FUROSEMIDE 40 MG
40 TABLET ORAL EVERY 12 HOURS
Qty: 0 | Refills: 0 | Status: DISCONTINUED | OUTPATIENT
Start: 2019-01-26 | End: 2019-01-26

## 2019-01-25 RX ORDER — PHYTONADIONE (VIT K1) 5 MG
2.5 TABLET ORAL ONCE
Qty: 0 | Refills: 0 | Status: COMPLETED | OUTPATIENT
Start: 2019-01-25 | End: 2019-01-25

## 2019-01-25 RX ADMIN — INSULIN GLARGINE 9 UNIT(S): 100 INJECTION, SOLUTION SUBCUTANEOUS at 23:09

## 2019-01-25 RX ADMIN — Medication 3 MILLILITER(S): at 23:09

## 2019-01-25 RX ADMIN — Medication 2.5 MILLIGRAM(S): at 13:37

## 2019-01-25 RX ADMIN — Medication 650 MILLIGRAM(S): at 11:59

## 2019-01-25 RX ADMIN — CLOPIDOGREL BISULFATE 75 MILLIGRAM(S): 75 TABLET, FILM COATED ORAL at 12:00

## 2019-01-25 RX ADMIN — Medication 3 MILLILITER(S): at 18:30

## 2019-01-25 RX ADMIN — Medication 3 MILLILITER(S): at 05:56

## 2019-01-25 RX ADMIN — AMLODIPINE BESYLATE 5 MILLIGRAM(S): 2.5 TABLET ORAL at 11:58

## 2019-01-25 RX ADMIN — ATORVASTATIN CALCIUM 40 MILLIGRAM(S): 80 TABLET, FILM COATED ORAL at 23:10

## 2019-01-25 RX ADMIN — NYSTATIN CREAM 1 APPLICATION(S): 100000 CREAM TOPICAL at 08:17

## 2019-01-25 RX ADMIN — Medication 650 MILLIGRAM(S): at 12:55

## 2019-01-25 RX ADMIN — FINASTERIDE 5 MILLIGRAM(S): 5 TABLET, FILM COATED ORAL at 12:00

## 2019-01-25 RX ADMIN — SODIUM CHLORIDE 50 MILLILITER(S): 9 INJECTION INTRAMUSCULAR; INTRAVENOUS; SUBCUTANEOUS at 04:35

## 2019-01-25 RX ADMIN — Medication 80 MILLIGRAM(S): at 05:56

## 2019-01-25 RX ADMIN — NYSTATIN CREAM 1 APPLICATION(S): 100000 CREAM TOPICAL at 19:00

## 2019-01-25 RX ADMIN — Medication 3 MILLILITER(S): at 11:59

## 2019-01-25 NOTE — PROGRESS NOTE ADULT - SUBJECTIVE AND OBJECTIVE BOX
*** Structural Heart Team ***    Pt has no complaints today.  Had episode of hypotension overnight.  Currently no cp, sob, dizziness      REVIEW OF SYSTEMS:    CONSTITUTIONAL: No weakness, fevers or chills  EYES/ENT: No visual changes;  No vertigo or throat pain   NECK: No pain or stiffness  RESPIRATORY: No cough, wheezing, hemoptysis; No shortness of breath  CARDIOVASCULAR: No chest pain or palpitations  GASTROINTESTINAL: No abdominal or epigastric pain. No nausea, vomiting, or hematemesis; No diarrhea or constipation. No melena or hematochezia.  GENITOURINARY: No dysuria, frequency or hematuria  NEUROLOGICAL: No numbness or weakness  SKIN: No itching, rashes      Allergies    latex (Unknown)  Sulfacetamide Sodium (Unknown)    Intolerances      Vital Signs Last 24 Hrs  T(C): 36.7 (25 Jan 2019 11:02), Max: 36.9 (25 Jan 2019 02:00)  T(F): 98.1 (25 Jan 2019 11:02), Max: 98.5 (25 Jan 2019 02:00)  HR: 78 (25 Jan 2019 12:31) (68 - 82)  BP: 105/68 (25 Jan 2019 11:02) (78/50 - 117/75)  BP(mean): --  RR: 18 (25 Jan 2019 12:31) (17 - 18)  SpO2: 98% (25 Jan 2019 12:31) (94% - 99%)    MEDICATIONS  (STANDING):  ALBUTerol/ipratropium for Nebulization 3 milliLiter(s) Nebulizer every 6 hours  amLODIPine   Tablet 5 milliGRAM(s) Oral daily  atorvastatin 40 milliGRAM(s) Oral at bedtime  clopidogrel Tablet 75 milliGRAM(s) Oral daily  dextrose 5%. 1000 milliLiter(s) (50 mL/Hr) IV Continuous <Continuous>  dextrose 50% Injectable 12.5 Gram(s) IV Push once  dextrose 50% Injectable 25 Gram(s) IV Push once  dextrose 50% Injectable 25 Gram(s) IV Push once  finasteride 5 milliGRAM(s) Oral daily  insulin glargine Injectable (LANTUS) 9 Unit(s) SubCutaneous at bedtime  insulin lispro (HumaLOG) corrective regimen sliding scale   SubCutaneous three times a day before meals  insulin lispro (HumaLOG) corrective regimen sliding scale   SubCutaneous at bedtime  nystatin Powder 1 Application(s) Topical two times a day      Exam-  General: NAD  Cor: s1s2, IRR, II/VI systolic murmur  Pulm: b/l rales  Gastointestinal: soft, nontender, nondistended, +bowel sounds  Extremities: 2+ edema  Neuro: A&Ox3, nonfocal                          9.4    14.17 )-----------( 188      ( 24 Jan 2019 07:08 )             26.7   01-25    142  |  109<H>  |  83<H>  ----------------------------<  93  3.7   |  19<L>  |  2.35<H>    Ca    9.2      25 Jan 2019 06:32  Phos  3.9     01-25  Mg     2.3     01-25    TPro  5.5<L>  /  Alb  2.0<L>  /  TBili  2.8<H>  /  DBili  1.7<H>  /  AST  55<H>  /  ALT  35  /  AlkPhos  138<H>  01-25  PT/INR - ( 25 Jan 2019 08:44 )   PT: 93.3 sec;   INR: 7.68 ratio           I&O's Summary    24 Jan 2019 07:01  -  25 Jan 2019 07:00  --------------------------------------------------------  IN: 1240 mL / OUT: 2450 mL / NET: -1210 mL    25 Jan 2019 07:01  -  25 Jan 2019 15:53  --------------------------------------------------------  IN: 240 mL / OUT: 400 mL / NET: -160 mL              Assessment/Plan:  Mr. Vega is a 79y/o male with HTN, CHF, CAD s/p PCI, AFib on Coumadin, DM and severe symptomatic Aortic Stenosis admitted from home by PMD with supratherapeutic INR with epistaxis, and acute on chronic CHF exacerbation class III. Undergoing evaluation for TAVR for severe symptomatic aortic stenosis.  - CT scan done without bump in creatinine  - still needs cardiac cath (with possible bav) but INR is 7.68!  -- will see how INR responds to Vit K and proceed from there     PRATIK Matos  [phone]

## 2019-01-25 NOTE — PROGRESS NOTE ADULT - PROBLEM SELECTOR PLAN 1
Likely cardiorenal in nature and Crt improving with lasix drip  Cont decongestion via drip for few more days  Would be extremely cautious with diuresis as patient is preload dependent with AS and had episodes of hypotension which might be due to diuresis with severe AS and would gently diurese to net negative 500 cc-1L maximum and creatinine is climbing as well concerning for overdiuresis and drop in renal perfusion

## 2019-01-25 NOTE — PROGRESS NOTE ADULT - PROBLEM SELECTOR PLAN 2
Patient initially on presentation had INR in the 7's which improved subsequently with vitamin K, which leads me to believe there is deficiency.    Additionally now Alk phos is elevated with increase of bilirubin.    -Will order hepatitis panel and US of liver to rule out obstruction Patient initially on presentation had INR in the 7's which improved subsequently with vitamin K, which leads me to believe there is deficiency.    Will give Vitamin K 2.5 again  Additionally now Alk phos is elevated with increase of bilirubin, I suspect intrahepatic cholestasis.  Do not suspect any current medications to cause current pattern.    -Will order hepatitis panel and US of liver to rule out obstruction Patient initially on presentation had INR in the 7's which improved subsequently with vitamin K, which leads me to believe there is deficiency.    Will give Vitamin K 2.5 again  Additionally now Alk phos is elevated with increase of bilirubin, I suspect intrahepatic cholestasis, given patients right sided heart failure and tricuspid regurg Do not suspect any current medications to cause current pattern.    -Will order hepatitis panel and US of liver to rule out obstruction

## 2019-01-25 NOTE — CHART NOTE - NSCHARTNOTEFT_GEN_A_CORE
CC: Hypotension    Event Summary:   Notified by RN by for pt c/o dizziness, and found to be hypotensive to 78/50. Of note, patient is being diuresed for HF exacerbation, was previously on a lasix gtt and changed to lasix 80mg IVP TID yesterday.     Vital Signs Last 24 Hrs  T(C): 36.9 (25 Jan 2019 04:30), Max: 36.9 (25 Jan 2019 02:00)  T(F): 98.4 (25 Jan 2019 04:30), Max: 98.5 (25 Jan 2019 02:00)  HR: 68 (25 Jan 2019 04:30) (68 - 82)  BP: 95/63 (25 Jan 2019 04:30) (78/50 - 117/75)  BP(mean): --  RR: 18 (25 Jan 2019 04:30) (17 - 18)  SpO2: 94% (25 Jan 2019 04:30) (93% - 99%)    Physical Exam:  General: A&Ox3, in NAD  Card: S1/S2, RRR  Pulm: decreased breath sounds at bases  Abd: soft, nontender  Ext: edematous extremities x4      A/P:  # Hypotension, likely 2/2 diuresis  - Vital signs stable, afebrile.  - Gentle IV hydration x 4hours, BP improved to 95/63.  - F/u AM labs.   - Continue diuresis as per card/renal.  Will endorse to primary team in AM.      Toma Johnson PA-C  Department of Medicine  #[id number]

## 2019-01-25 NOTE — PROGRESS NOTE ADULT - SUBJECTIVE AND OBJECTIVE BOX
Patient is a 78y old  Male who presents with a chief complaint of 3 months of progressive B/L LE oedema and dyspnoea on exertion. (2019 10:24)      INTERVAL HPI/OVERNIGHT EVENTS:  Episode of hypotension last night. patient reports feeling well now.      Heart failure  Family history of hypertension (Mother)  Handoff  MEWS Score  CAD (coronary artery disease)  Prostatitis syndrome  UTI (urinary tract infection)  Diabetes  Stented coronary artery  High cholesterol  HTN (hypertension)  Afib  CHF (congestive heart failure)  Acute renal failure, unspecified acute renal failure type  Coronary artery disease involving native coronary artery of native heart without angina pectoris  Hypokalemia  Acute respiratory failure with hypoxia  Hypernatremia  CAD (coronary artery disease)  CHF (congestive heart failure)  Severe aortic stenosis  Epistaxis  Scrotal edema  Chronic atrial fibrillation  Type 2 diabetes mellitus with other diabetic kidney complication, without long-term current use of insulin  Bradycardia  Acute on chronic systolic congestive heart failure  HTN (hypertension)  Edema  Metabolic acidosis  ELADIA (acute kidney injury)  Urethral catheterization  S/p bare metal coronary artery stent  No significant past surgical history  PEREZ 3 MONTHS  90+  ARF (acute renal failure)      Review of Systems: 12 point review of systems otherwise negative  ( - )fevers/chills  ( - ) dyspnea  ( - ) cough  ( - ) chest pain  ( - ) palpitations  ( + ) dizziness/lightheadedness  ( - ) nausea/vomiting  ( - ) abd pain  ( - ) diarrhea  ( - ) melena  ( - ) hematochezia  ( - ) dysuria  ( - ) hematuria  ( - ) leg swelling  ( -) calf tenderness  ( - ) motor weakness  ( - ) extremity numbness  ( - ) back pain  ( + ) tolerating POs  ( + ) BM    MEDICATIONS  (STANDING):  ALBUTerol/ipratropium for Nebulization 3 milliLiter(s) Nebulizer every 6 hours  amLODIPine   Tablet 5 milliGRAM(s) Oral daily  atorvastatin 40 milliGRAM(s) Oral at bedtime  clopidogrel Tablet 75 milliGRAM(s) Oral daily  dextrose 5%. 1000 milliLiter(s) (50 mL/Hr) IV Continuous <Continuous>  dextrose 50% Injectable 12.5 Gram(s) IV Push once  dextrose 50% Injectable 25 Gram(s) IV Push once  dextrose 50% Injectable 25 Gram(s) IV Push once  finasteride 5 milliGRAM(s) Oral daily  furosemide   Injectable 80 milliGRAM(s) IV Push every 12 hours  insulin glargine Injectable (LANTUS) 9 Unit(s) SubCutaneous at bedtime  insulin lispro (HumaLOG) corrective regimen sliding scale   SubCutaneous three times a day before meals  insulin lispro (HumaLOG) corrective regimen sliding scale   SubCutaneous at bedtime  nystatin Powder 1 Application(s) Topical two times a day    MEDICATIONS  (PRN):  acetaminophen   Tablet .. 650 milliGRAM(s) Oral every 6 hours PRN Moderate Pain (4 - 6)  dextrose 40% Gel 15 Gram(s) Oral once PRN Blood Glucose LESS THAN 70 milliGRAM(s)/deciliter  glucagon  Injectable 1 milliGRAM(s) IntraMuscular once PRN Glucose LESS THAN 70 milligrams/deciliter      Allergies    latex (Unknown)  Sulfacetamide Sodium (Unknown)    Intolerances          Vital Signs Last 24 Hrs  T(C): 36.7 (2019 11:02), Max: 36.9 (2019 02:00)  T(F): 98.1 (2019 11:02), Max: 98.5 (2019 02:00)  HR: 78 (2019 12:31) (68 - 82)  BP: 105/68 (2019 11:02) (78/50 - 117/75)  BP(mean): --  RR: 18 (2019 12:31) (17 - 18)  SpO2: 98% (2019 12:31) (94% - 99%)  CAPILLARY BLOOD GLUCOSE      POCT Blood Glucose.: 100 mg/dL (2019 11:35)  POCT Blood Glucose.: 99 mg/dL (2019 07:41)  POCT Blood Glucose.: 132 mg/dL (2019 21:33)  POCT Blood Glucose.: 115 mg/dL (2019 16:40)       @ 07: @ 07:00  --------------------------------------------------------  IN: 1240 mL / OUT: 2450 mL / NET: -1210 mL     @ 07: @ 14:21  --------------------------------------------------------  IN: 240 mL / OUT: 400 mL / NET: -160 mL        Physical Exam:    Daily     Daily Weight in k.6 (2019 07:14)  General:  ill appearing  HEENT:  Nonicteric, PERRLA  CV:  RRR on my exam, systolic ejection murmur   Lungs:  Mild crackles at bilateral bases   Abdomen:  Soft, non-tender, no distended, positive BS,  Extremities: +2 edema of upper and lower extremities   Skin:  Warm and dry, no rashes  :  + morrison  Neuro:  AAOx3, non-focal  No Restraints    LABS:                        9.4    14.17 )-----------( 188      ( 2019 07:08 )             26.7         142  |  109<H>  |  83<H>  ----------------------------<  93  3.7   |  19<L>  |  2.35<H>    Ca    9.2      2019 06:32  Phos  3.9       Mg     2.3         TPro  5.5<L>  /  Alb  2.0<L>  /  TBili  2.8<H>  /  DBili  1.7<H>  /  AST  55<H>  /  ALT  35  /  AlkPhos  138<H>      PT/INR - ( 2019 08:44 )   PT: 93.3 sec;   INR: 7.68 ratio                 RADIOLOGY & ADDITIONAL TESTS:    ---------------------------------------------------------------------------  I personally reviewed: [  ]EKG   [  ]CXR    [  ] CT    [  ]Other  ---------------------------------------------------------------------------  PLEASE CHECK WHEN PRESENT:     [  ]Heart Failure     [  ] Acute     [  ] Acute on Chronic     [  ] Chronic  -------------------------------------------------------------------     [  ]Diastolic [HFpEF]     [  ]Systolic [HFrEF]     [  ]Combined [HFpEF & HFrEF]     [  ]Other:  -------------------------------------------------------------------  [  ]ELADIA     [  ]ATN     [  ]Reneal Medullary Necrosis     [  ]Renal Cortical Necrosis     [  ]Other Pathological Lesions:    [  ]CKD 1  [  ]CKD 2  [  ]CKD 3  [  ]CKD 4  [  ]CKD 5  [  ]Other  -------------------------------------------------------------------  [  ]Other/Unspecified:    --------------------------------------------------------------------    Abdominal Nutritional Status  [  ]Malnutrition: See Nutrition Note  [  ]Cachexia  [  ]Other:   [  ]Supplement Ordered:  [  ]Morbid Obesity (BMI >=40]

## 2019-01-25 NOTE — PROGRESS NOTE ADULT - PROBLEM SELECTOR PLAN 1
Lasix IV 40 BID as patient with episode of hypotension -   echo with EF 45 % with severe AS with right sided heart failure  Daily weights strict I+Os.   cont ASA, Plavix.    Plan for cardiac cath tentatively on Monday

## 2019-01-25 NOTE — PROGRESS NOTE ADULT - SUBJECTIVE AND OBJECTIVE BOX
Patient seen and examined at bedside.    Overnight Events:       REVIEW OF SYSTEMS:  Constitutional:     No fevers, chills, weight loss, weight gain  HEENT:                  No dry eyes, nasal congestion, postnasal drip  CV:                         No chest pain, palpitations, orthopnea, PND  Resp:                     No cough, SOB, dyspnea, wheezing, sputum  GI:                          No nausea, vomiting, abdominal pain, diarrhea, constipation  :                        No dysuria, nocturia, hematuria, increased urinary frequency  Musculoskeletal: No back pain, myalgias, arthralgias   Skin:                       No rash, pruritus, ecchymoses  Neurological:        No headache, dizziness, syncope, weakness, numbness  Psychiatric:           No anxiety, depression   Endocrine:            No hot/cold intolerance, polydipsia  Heme/Lymph:      No bleeding, easy bruising  Allergic/Immune: No itchy eyes, rhinorrhea, hives angioedema      Current Meds:  acetaminophen   Tablet .. 650 milliGRAM(s) Oral every 6 hours PRN  ALBUTerol/ipratropium for Nebulization 3 milliLiter(s) Nebulizer every 6 hours  amLODIPine   Tablet 5 milliGRAM(s) Oral daily  atorvastatin 40 milliGRAM(s) Oral at bedtime  clopidogrel Tablet 75 milliGRAM(s) Oral daily  dextrose 40% Gel 15 Gram(s) Oral once PRN  dextrose 5%. 1000 milliLiter(s) IV Continuous <Continuous>  dextrose 50% Injectable 12.5 Gram(s) IV Push once  dextrose 50% Injectable 25 Gram(s) IV Push once  dextrose 50% Injectable 25 Gram(s) IV Push once  finasteride 5 milliGRAM(s) Oral daily  glucagon  Injectable 1 milliGRAM(s) IntraMuscular once PRN  insulin glargine Injectable (LANTUS) 9 Unit(s) SubCutaneous at bedtime  insulin lispro (HumaLOG) corrective regimen sliding scale   SubCutaneous three times a day before meals  insulin lispro (HumaLOG) corrective regimen sliding scale   SubCutaneous at bedtime  nystatin Powder 1 Application(s) Topical two times a day      PAST MEDICAL & SURGICAL HISTORY:  CAD (coronary artery disease)  Prostatitis syndrome  UTI (urinary tract infection)  Diabetes  Stented coronary artery  High cholesterol  HTN (hypertension)  Afib  S/p bare metal coronary artery stent      Vitals:  T(F): 98.4 (01-25), Max: 98.5 (01-25)  HR: 72 (01-25) (68 - 82)  BP: 95/63 (01-25) (78/50 - 117/75)  RR: 18 (01-25)  SpO2: 96% (01-25)  I&O's Summary    24 Jan 2019 07:01  -  25 Jan 2019 07:00  --------------------------------------------------------  IN: 1240 mL / OUT: 2450 mL / NET: -1210 mL    25 Jan 2019 07:01  -  25 Jan 2019 10:25  --------------------------------------------------------  IN: 240 mL / OUT: 0 mL / NET: 240 mL        Physical Exam:  Appearance: No acute distress; well appearing  Eyes: PERRL, EOMI, pink conjunctiva  HENT: Normal oral mucosa  Cardiovascular: RRR, S1, S2, no murmurs, rubs, or gallops; no edema; no JVD  Respiratory: Clear to auscultation bilaterally  Gastrointestinal: soft, non-tender, non-distended with normal bowel sounds  Musculoskeletal: No clubbing; no joint deformity   Neurologic: Non-focal  Lymphatic: No lymphadenopathy  Psychiatry: AAOx3, mood & affect appropriate  Skin: No rashes, ecchymoses, or cyanosis                          9.4    14.17 )-----------( 188      ( 24 Jan 2019 07:08 )             26.7     01-25    142  |  109<H>  |  83<H>  ----------------------------<  93  3.7   |  19<L>  |  2.35<H>    Ca    9.2      25 Jan 2019 06:32  Phos  3.9     01-25  Mg     2.3     01-25    TPro  5.5<L>  /  Alb  2.0<L>  /  TBili  2.8<H>  /  DBili  1.7<H>  /  AST  55<H>  /  ALT  35  /  AlkPhos  138<H>  01-25    PT/INR - ( 25 Jan 2019 08:44 )   PT: 93.3 sec;   INR: 7.68 ratio           Interpretation of Telemetry: AF 60-80s

## 2019-01-25 NOTE — PROGRESS NOTE ADULT - PROBLEM SELECTOR PLAN 3
Likely secondary to CHF exacerbation  - Patient with L and R sided heart failure with severe AS by valve size and dilated LA along with severe pHTN likely secondary to heart failure. Will likely require intervention for valve and continued diuresis to help overall functional status and for resolution of kidney function  -would cut back on diuresis now given hypotension and diuresis very gently, appreciate TAVR eval.  -plan for LHC or CTA tomorrow and space out for 72 hours with each test. Best time to do it as crt stable and less overloaded

## 2019-01-25 NOTE — PROGRESS NOTE ADULT - ATTENDING COMMENTS
I have seen this patient with the fellow and agree with their assessment and plan. In addition, initially ELADIA in setting of volume overload from tight AS and L-CHF. Now off lasix drip and hypotensive, not sure if overdiuresed or going into shock. BP improved since am, can back off to lasix 80mg IV BID.  If crt tomorrow is worse, can hold diuretics  Plan for LHC today per TAVR team to decide TAVR plans, if crt continues to rise tomorrow and sunday, would hold CTA for sometime till crt stabalizes    Devonte Brower MD  Cell   Pager   Office     For weekend coverage, please call Dr Melissa Anthony( fellow) or Dr Darrius Ojeda(attending) I have seen this patient with the fellow and agree with their assessment and plan. In addition, initially ELADIA in setting of volume overload from tight AS and L-CHF. Now off lasix drip and hypotensive, not sure if overdiuresed or going into shock. BP improved since am, can back off to lasix 80mg IV BID.  If crt tomorrow is worse, can hold diuretics  Got CTA done yesterday and slight increase in crt could be from the CTA contrast. Per TAVR team to decide TAVR plans, if crt continues to rise tomorrow and sunday, would hold LHC for sometime till crt stabalizes, regardless INR is high now  Moderate bilateral pleural effusions noted on CTA yesterday, would be cautious if holding diuresis.  High density foci in the pelvis may be related to the prostate or bladder stones noted on scan- will need a sonogram of the bladder to assess better.    Devonte Brower MD  Cell   Pager   Office     For weekend coverage, please call Dr Melissa Anthony( fellow) or Dr Darrius Ojeda(attending)

## 2019-01-25 NOTE — PROGRESS NOTE ADULT - SUBJECTIVE AND OBJECTIVE BOX
[facility] Division of Kidney Diseases & Hypertension  FOLLOW UP NOTE  [phone]--------------------------------------------------------------------------------  Chief Complaint:Heart failure      24 hour events/subjective: Patient appears lethargic today and confused. States his breathing is a little more labored. No pain anywhere. No other complaints offered during exam.        PAST HISTORY  --------------------------------------------------------------------------------  No significant changes to PMH, PSH, FHx, SHx, unless otherwise noted    ALLERGIES & MEDICATIONS  --------------------------------------------------------------------------------  Allergies    latex (Unknown)  Sulfacetamide Sodium (Unknown)    Intolerances      Standing Inpatient Medications  ALBUTerol/ipratropium for Nebulization 3 milliLiter(s) Nebulizer every 6 hours  amLODIPine   Tablet 5 milliGRAM(s) Oral daily  atorvastatin 40 milliGRAM(s) Oral at bedtime  clopidogrel Tablet 75 milliGRAM(s) Oral daily  dextrose 5%. 1000 milliLiter(s) IV Continuous <Continuous>  dextrose 50% Injectable 12.5 Gram(s) IV Push once  dextrose 50% Injectable 25 Gram(s) IV Push once  dextrose 50% Injectable 25 Gram(s) IV Push once  finasteride 5 milliGRAM(s) Oral daily  furosemide   Injectable 80 milliGRAM(s) IV Push every 12 hours  insulin glargine Injectable (LANTUS) 9 Unit(s) SubCutaneous at bedtime  insulin lispro (HumaLOG) corrective regimen sliding scale   SubCutaneous three times a day before meals  insulin lispro (HumaLOG) corrective regimen sliding scale   SubCutaneous at bedtime  nystatin Powder 1 Application(s) Topical two times a day    PRN Inpatient Medications  acetaminophen   Tablet .. 650 milliGRAM(s) Oral every 6 hours PRN  dextrose 40% Gel 15 Gram(s) Oral once PRN  glucagon  Injectable 1 milliGRAM(s) IntraMuscular once PRN      REVIEW OF SYSTEMS  --------------------------------------------------------------------------------  Gen: No  fevers/chills  Skin: No rashes  Head/Eyes/Ears/Mouth: No headache; Normal hearing; Normal vision w/o blurriness  Respiratory: No dyspnea, cough, wheezing, hemoptysis  CV: No chest pain, PND, orthopnea  GI: No abdominal pain, diarrhea, constipation, nausea, vomiting  : No increased frequency, dysuria, hematuria, nocturia  MSK: No joint pain/swelling; no back pain; no edema  Neuro: No dizziness/lightheadedness, weakness, seizures, numbness, tingling      All other systems were reviewed and are negative, except as noted.    VITALS/PHYSICAL EXAM  --------------------------------------------------------------------------------  T(C): 36.7 (01-25-19 @ 11:02), Max: 36.9 (01-25-19 @ 02:00)  HR: 78 (01-25-19 @ 12:31) (68 - 82)  BP: 105/68 (01-25-19 @ 11:02) (78/50 - 117/75)  RR: 18 (01-25-19 @ 12:31) (17 - 18)  SpO2: 98% (01-25-19 @ 12:31) (94% - 99%)  Wt(kg): --        01-24-19 @ 07:01  -  01-25-19 @ 07:00  --------------------------------------------------------  IN: 1240 mL / OUT: 2450 mL / NET: -1210 mL    01-25-19 @ 07:01  -  01-25-19 @ 14:53  --------------------------------------------------------  IN: 240 mL / OUT: 400 mL / NET: -160 mL      Physical Exam:  	Gen: lethargic, in mild-moderate distress  	HEENT: PERRL, supple neck, clear oropharynx  	Pulm: Crackles in lung fields diffusely  	CV: RRR, S1S2;  	Back: No spinal or CVA tenderness  	Abd: +BS, soft, nontender/nondistended  	: No suprapubic tenderness              Extremities: 1/2+ pitting edema of LE.              Neuro: No focal deficits, intact gait  	Skin: Warm, without rashes  	Vascular access: N/A    LABS/STUDIES  --------------------------------------------------------------------------------              9.4    14.17 >-----------<  188      [01-24-19 @ 07:08]              26.7     142  |  109  |  83  ----------------------------<  93      [01-25-19 @ 06:32]  3.7   |  19  |  2.35        Ca     9.2     [01-25-19 @ 06:32]      Mg     2.3     [01-25-19 @ 06:32]      Phos  3.9     [01-25-19 @ 06:32]    TPro  5.5  /  Alb  2.0  /  TBili  2.8  /  DBili  1.7  /  AST  55  /  ALT  35  /  AlkPhos  138  [01-25-19 @ 06:32]    PT/INR: PT 93.3 , INR 7.68       [01-25-19 @ 08:44]      Creatinine Trend:  SCr 2.35 [01-25 @ 06:32]  SCr 2.20 [01-24 @ 18:13]  SCr 2.24 [01-24 @ 05:41]  SCr 2.27 [01-23 @ 16:19]  SCr 2.35 [01-23 @ 06:05]

## 2019-01-26 LAB
ALBUMIN SERPL ELPH-MCNC: 1.6 G/DL — LOW (ref 3.3–5)
ALBUMIN SERPL ELPH-MCNC: 1.9 G/DL — LOW (ref 3.3–5)
ALP SERPL-CCNC: 139 U/L — HIGH (ref 40–120)
ALP SERPL-CCNC: 145 U/L — HIGH (ref 40–120)
ALT FLD-CCNC: 40 U/L — SIGNIFICANT CHANGE UP (ref 10–45)
ALT FLD-CCNC: 40 U/L — SIGNIFICANT CHANGE UP (ref 10–45)
ANION GAP SERPL CALC-SCNC: 14 MMOL/L — SIGNIFICANT CHANGE UP (ref 5–17)
ANION GAP SERPL CALC-SCNC: 14 MMOL/L — SIGNIFICANT CHANGE UP (ref 5–17)
APPEARANCE UR: ABNORMAL
APTT BLD: 50.1 SEC — HIGH (ref 27.5–36.3)
APTT BLD: 53.3 SEC — HIGH (ref 27.5–36.3)
AST SERPL-CCNC: 84 U/L — HIGH (ref 10–40)
AST SERPL-CCNC: 88 U/L — HIGH (ref 10–40)
BACTERIA # UR AUTO: ABNORMAL
BASE EXCESS BLDV CALC-SCNC: -4.1 MMOL/L — LOW (ref -2–2)
BILIRUB SERPL-MCNC: 3.2 MG/DL — HIGH (ref 0.2–1.2)
BILIRUB SERPL-MCNC: 3.6 MG/DL — HIGH (ref 0.2–1.2)
BILIRUB UR-MCNC: NEGATIVE — SIGNIFICANT CHANGE UP
BUN SERPL-MCNC: 95 MG/DL — HIGH (ref 7–23)
BUN SERPL-MCNC: 98 MG/DL — HIGH (ref 7–23)
CALCIUM SERPL-MCNC: 9.1 MG/DL — SIGNIFICANT CHANGE UP (ref 8.4–10.5)
CALCIUM SERPL-MCNC: 9.3 MG/DL — SIGNIFICANT CHANGE UP (ref 8.4–10.5)
CHLORIDE SERPL-SCNC: 108 MMOL/L — SIGNIFICANT CHANGE UP (ref 96–108)
CHLORIDE SERPL-SCNC: 109 MMOL/L — HIGH (ref 96–108)
CK MB CFR SERPL CALC: 2.4 NG/ML — SIGNIFICANT CHANGE UP (ref 0–6.7)
CK SERPL-CCNC: 99 U/L — SIGNIFICANT CHANGE UP (ref 30–200)
CO2 BLDV-SCNC: 24 MMOL/L — SIGNIFICANT CHANGE UP (ref 22–30)
CO2 SERPL-SCNC: 18 MMOL/L — LOW (ref 22–31)
CO2 SERPL-SCNC: 19 MMOL/L — LOW (ref 22–31)
COLOR SPEC: ABNORMAL
CREAT SERPL-MCNC: 2.63 MG/DL — HIGH (ref 0.5–1.3)
CREAT SERPL-MCNC: 2.75 MG/DL — HIGH (ref 0.5–1.3)
DIFF PNL FLD: ABNORMAL
EPI CELLS # UR: 2 — SIGNIFICANT CHANGE UP
GAS PNL BLDA: SIGNIFICANT CHANGE UP
GAS PNL BLDA: SIGNIFICANT CHANGE UP
GAS PNL BLDV: SIGNIFICANT CHANGE UP
GLUCOSE SERPL-MCNC: 107 MG/DL — HIGH (ref 70–99)
GLUCOSE SERPL-MCNC: 94 MG/DL — SIGNIFICANT CHANGE UP (ref 70–99)
GLUCOSE UR QL: NEGATIVE — SIGNIFICANT CHANGE UP
GRAM STN FLD: SIGNIFICANT CHANGE UP
HCO3 BLDV-SCNC: 23 MMOL/L — SIGNIFICANT CHANGE UP (ref 21–29)
HCT VFR BLD CALC: 26.9 % — LOW (ref 39–50)
HCT VFR BLD CALC: 28.9 % — LOW (ref 39–50)
HCT VFR BLD CALC: 29.7 % — LOW (ref 39–50)
HGB BLD-MCNC: 10 G/DL — LOW (ref 13–17)
HGB BLD-MCNC: 8.8 G/DL — LOW (ref 13–17)
HGB BLD-MCNC: 9.6 G/DL — LOW (ref 13–17)
HOROWITZ INDEX BLDV+IHG-RTO: 100 — SIGNIFICANT CHANGE UP
HYALINE CASTS # UR AUTO: 2 /LPF — SIGNIFICANT CHANGE UP (ref 0–7)
INR BLD: 2.89 RATIO — HIGH (ref 0.88–1.16)
INR BLD: 2.99 RATIO — HIGH (ref 0.88–1.16)
INR BLD: 3.62 RATIO — HIGH (ref 0.88–1.16)
KETONES UR-MCNC: NEGATIVE — SIGNIFICANT CHANGE UP
LEUKOCYTE ESTERASE UR-ACNC: ABNORMAL
MAGNESIUM SERPL-MCNC: 2.2 MG/DL — SIGNIFICANT CHANGE UP (ref 1.6–2.6)
MCHC RBC-ENTMCNC: 28.7 PG — SIGNIFICANT CHANGE UP (ref 27–34)
MCHC RBC-ENTMCNC: 30 PG — SIGNIFICANT CHANGE UP (ref 27–34)
MCHC RBC-ENTMCNC: 30.4 PG — SIGNIFICANT CHANGE UP (ref 27–34)
MCHC RBC-ENTMCNC: 32.7 GM/DL — SIGNIFICANT CHANGE UP (ref 32–36)
MCHC RBC-ENTMCNC: 33.4 GM/DL — SIGNIFICANT CHANGE UP (ref 32–36)
MCHC RBC-ENTMCNC: 33.6 GM/DL — SIGNIFICANT CHANGE UP (ref 32–36)
MCV RBC AUTO: 87.6 FL — SIGNIFICANT CHANGE UP (ref 80–100)
MCV RBC AUTO: 89.4 FL — SIGNIFICANT CHANGE UP (ref 80–100)
MCV RBC AUTO: 90.8 FL — SIGNIFICANT CHANGE UP (ref 80–100)
METHOD TYPE: SIGNIFICANT CHANGE UP
MRSA SPEC QL CULT: SIGNIFICANT CHANGE UP
NITRITE UR-MCNC: NEGATIVE — SIGNIFICANT CHANGE UP
PCO2 BLDV: 54 MMHG — HIGH (ref 35–50)
PH BLDV: 7.25 — LOW (ref 7.35–7.45)
PH UR: 6 — SIGNIFICANT CHANGE UP (ref 5–8)
PHOSPHATE SERPL-MCNC: 4.7 MG/DL — HIGH (ref 2.5–4.5)
PLATELET # BLD AUTO: 212 K/UL — SIGNIFICANT CHANGE UP (ref 150–400)
PLATELET # BLD AUTO: 246 K/UL — SIGNIFICANT CHANGE UP (ref 150–400)
PLATELET # BLD AUTO: 249 K/UL — SIGNIFICANT CHANGE UP (ref 150–400)
PO2 BLDV: 59 MMHG — HIGH (ref 25–45)
POTASSIUM SERPL-MCNC: 3.9 MMOL/L — SIGNIFICANT CHANGE UP (ref 3.5–5.3)
POTASSIUM SERPL-MCNC: 4 MMOL/L — SIGNIFICANT CHANGE UP (ref 3.5–5.3)
POTASSIUM SERPL-SCNC: 3.9 MMOL/L — SIGNIFICANT CHANGE UP (ref 3.5–5.3)
POTASSIUM SERPL-SCNC: 4 MMOL/L — SIGNIFICANT CHANGE UP (ref 3.5–5.3)
PROT SERPL-MCNC: 5.3 G/DL — LOW (ref 6–8.3)
PROT SERPL-MCNC: 5.6 G/DL — LOW (ref 6–8.3)
PROT UR-MCNC: ABNORMAL
PROTHROM AB SERPL-ACNC: 34.4 SEC — HIGH (ref 10–12.9)
PROTHROM AB SERPL-ACNC: 35.6 SEC — HIGH (ref 10–12.9)
PROTHROM AB SERPL-ACNC: 43 SEC — HIGH (ref 10–13.1)
RBC # BLD: 3.07 M/UL — LOW (ref 4.2–5.8)
RBC # BLD: 3.18 M/UL — LOW (ref 4.2–5.8)
RBC # BLD: 3.32 M/UL — LOW (ref 4.2–5.8)
RBC # FLD: 21.1 % — HIGH (ref 10.3–14.5)
RBC # FLD: 22.9 % — HIGH (ref 10.3–14.5)
RBC # FLD: 23.7 % — HIGH (ref 10.3–14.5)
RBC CASTS # UR COMP ASSIST: 30 /HPF — HIGH (ref 0–4)
SAO2 % BLDV: 82 % — SIGNIFICANT CHANGE UP (ref 67–88)
SODIUM SERPL-SCNC: 140 MMOL/L — SIGNIFICANT CHANGE UP (ref 135–145)
SODIUM SERPL-SCNC: 142 MMOL/L — SIGNIFICANT CHANGE UP (ref 135–145)
SP GR SPEC: 1.02 — SIGNIFICANT CHANGE UP (ref 1.01–1.02)
SPECIMEN SOURCE: SIGNIFICANT CHANGE UP
SPECIMEN SOURCE: SIGNIFICANT CHANGE UP
TROPONIN T, HIGH SENSITIVITY RESULT: 108 NG/L — HIGH (ref 0–51)
UROBILINOGEN FLD QL: ABNORMAL
WBC # BLD: 12.22 K/UL — HIGH (ref 3.8–10.5)
WBC # BLD: 15.8 K/UL — HIGH (ref 3.8–10.5)
WBC # BLD: 17.1 K/UL — HIGH (ref 3.8–10.5)
WBC # FLD AUTO: 12.22 K/UL — HIGH (ref 3.8–10.5)
WBC # FLD AUTO: 15.8 K/UL — HIGH (ref 3.8–10.5)
WBC # FLD AUTO: 17.1 K/UL — HIGH (ref 3.8–10.5)
WBC UR QL: >50

## 2019-01-26 PROCEDURE — 93010 ELECTROCARDIOGRAM REPORT: CPT

## 2019-01-26 PROCEDURE — 71045 X-RAY EXAM CHEST 1 VIEW: CPT | Mod: 26

## 2019-01-26 PROCEDURE — 93306 TTE W/DOPPLER COMPLETE: CPT | Mod: 26

## 2019-01-26 PROCEDURE — 99291 CRITICAL CARE FIRST HOUR: CPT

## 2019-01-26 PROCEDURE — 99233 SBSQ HOSP IP/OBS HIGH 50: CPT | Mod: GC

## 2019-01-26 RX ORDER — FENTANYL CITRATE 50 UG/ML
50 INJECTION INTRAVENOUS ONCE
Qty: 0 | Refills: 0 | Status: DISCONTINUED | OUTPATIENT
Start: 2019-01-26 | End: 2019-01-26

## 2019-01-26 RX ORDER — CHLORHEXIDINE GLUCONATE 213 G/1000ML
1 SOLUTION TOPICAL
Qty: 0 | Refills: 0 | Status: DISCONTINUED | OUTPATIENT
Start: 2019-01-26 | End: 2019-01-29

## 2019-01-26 RX ORDER — DOBUTAMINE HCL 250MG/20ML
2 VIAL (ML) INTRAVENOUS
Qty: 500 | Refills: 0 | Status: DISCONTINUED | OUTPATIENT
Start: 2019-01-26 | End: 2019-01-26

## 2019-01-26 RX ORDER — ACETAMINOPHEN 500 MG
1000 TABLET ORAL ONCE
Qty: 0 | Refills: 0 | Status: COMPLETED | OUTPATIENT
Start: 2019-01-26 | End: 2019-01-26

## 2019-01-26 RX ORDER — DEXMEDETOMIDINE HYDROCHLORIDE IN 0.9% SODIUM CHLORIDE 4 UG/ML
0.05 INJECTION INTRAVENOUS
Qty: 200 | Refills: 0 | Status: DISCONTINUED | OUTPATIENT
Start: 2019-01-26 | End: 2019-01-27

## 2019-01-26 RX ORDER — SODIUM CHLORIDE 9 MG/ML
250 INJECTION INTRAMUSCULAR; INTRAVENOUS; SUBCUTANEOUS ONCE
Qty: 0 | Refills: 0 | Status: COMPLETED | OUTPATIENT
Start: 2019-01-26 | End: 2019-01-26

## 2019-01-26 RX ORDER — ASPIRIN/CALCIUM CARB/MAGNESIUM 324 MG
81 TABLET ORAL DAILY
Qty: 0 | Refills: 0 | Status: DISCONTINUED | OUTPATIENT
Start: 2019-01-26 | End: 2019-01-26

## 2019-01-26 RX ORDER — SODIUM CHLORIDE 9 MG/ML
500 INJECTION, SOLUTION INTRAVENOUS
Qty: 0 | Refills: 0 | Status: DISCONTINUED | OUTPATIENT
Start: 2019-01-26 | End: 2019-01-26

## 2019-01-26 RX ORDER — NOREPINEPHRINE BITARTRATE/D5W 8 MG/250ML
0.5 PLASTIC BAG, INJECTION (ML) INTRAVENOUS
Qty: 8 | Refills: 0 | Status: DISCONTINUED | OUTPATIENT
Start: 2019-01-26 | End: 2019-01-27

## 2019-01-26 RX ORDER — PIPERACILLIN AND TAZOBACTAM 4; .5 G/20ML; G/20ML
3.38 INJECTION, POWDER, LYOPHILIZED, FOR SOLUTION INTRAVENOUS EVERY 12 HOURS
Qty: 0 | Refills: 0 | Status: DISCONTINUED | OUTPATIENT
Start: 2019-01-26 | End: 2019-01-29

## 2019-01-26 RX ORDER — VANCOMYCIN HCL 1 G
1000 VIAL (EA) INTRAVENOUS DAILY
Qty: 0 | Refills: 0 | Status: DISCONTINUED | OUTPATIENT
Start: 2019-01-26 | End: 2019-01-27

## 2019-01-26 RX ADMIN — Medication 36.15 MICROGRAM(S)/KG/MIN: at 21:09

## 2019-01-26 RX ADMIN — Medication 1000 MILLIGRAM(S): at 10:05

## 2019-01-26 RX ADMIN — Medication 50 MILLIEQUIVALENT(S): at 23:42

## 2019-01-26 RX ADMIN — SODIUM CHLORIDE 250 MILLILITER(S): 9 INJECTION INTRAMUSCULAR; INTRAVENOUS; SUBCUTANEOUS at 11:31

## 2019-01-26 RX ADMIN — NYSTATIN CREAM 1 APPLICATION(S): 100000 CREAM TOPICAL at 18:17

## 2019-01-26 RX ADMIN — Medication 250 MILLIGRAM(S): at 12:16

## 2019-01-26 RX ADMIN — Medication 3 MILLILITER(S): at 17:27

## 2019-01-26 RX ADMIN — FENTANYL CITRATE 50 MICROGRAM(S): 50 INJECTION INTRAVENOUS at 23:35

## 2019-01-26 RX ADMIN — PIPERACILLIN AND TAZOBACTAM 25 GRAM(S): 4; .5 INJECTION, POWDER, LYOPHILIZED, FOR SOLUTION INTRAVENOUS at 18:33

## 2019-01-26 RX ADMIN — EPINEPHRINE 1 MILLIGRAM(S): 0.3 INJECTION INTRAMUSCULAR; SUBCUTANEOUS at 23:26

## 2019-01-26 RX ADMIN — Medication 400 MILLIGRAM(S): at 09:35

## 2019-01-26 RX ADMIN — Medication 3 MILLILITER(S): at 12:40

## 2019-01-26 RX ADMIN — Medication 36.15 MICROGRAM(S)/KG/MIN: at 08:14

## 2019-01-26 NOTE — PROCEDURE NOTE - NSINDICATIONS_GEN_A_CORE
hemodynamic monitoring/critical illness
arterial puncture to obtain ABG's/monitoring purposes/critical patient/blood sampling
bladder distention/post-void residual/urinry obstruction or retention

## 2019-01-26 NOTE — CHART NOTE - NSCHARTNOTEFT_GEN_A_CORE
CCU Accept Note    Transfer from: (  ) Medicine    (  ) Telemetry    (  ) RCU                                        (  ) Palliative     (  ) Stroke Unit    (  ) _______________    Accepting Physican:    HOSPITAL COURSE:       REVIEW OF SYSTEMS:     MEDICATIONS  (STANDING):  ALBUTerol/ipratropium for Nebulization 3 milliLiter(s) Nebulizer every 6 hours  amLODIPine   Tablet 5 milliGRAM(s) Oral daily  atorvastatin 40 milliGRAM(s) Oral at bedtime  clopidogrel Tablet 75 milliGRAM(s) Oral daily  dextrose 5%. 1000 milliLiter(s) (50 mL/Hr) IV Continuous <Continuous>  dextrose 50% Injectable 12.5 Gram(s) IV Push once  dextrose 50% Injectable 25 Gram(s) IV Push once  dextrose 50% Injectable 25 Gram(s) IV Push once  DOBUTamine Infusion 2 MICROgram(s)/kG/Min (5.784 mL/Hr) IV Continuous <Continuous>  finasteride 5 milliGRAM(s) Oral daily  furosemide   Injectable 40 milliGRAM(s) IV Push every 12 hours  insulin glargine Injectable (LANTUS) 9 Unit(s) SubCutaneous at bedtime  insulin lispro (HumaLOG) corrective regimen sliding scale   SubCutaneous three times a day before meals  insulin lispro (HumaLOG) corrective regimen sliding scale   SubCutaneous at bedtime  lactated ringers. 500 milliLiter(s) (500 mL/Hr) IV Continuous <Continuous>  nystatin Powder 1 Application(s) Topical two times a day    MEDICATIONS  (PRN):  acetaminophen   Tablet .. 650 milliGRAM(s) Oral every 6 hours PRN Moderate Pain (4 - 6)  dextrose 40% Gel 15 Gram(s) Oral once PRN Blood Glucose LESS THAN 70 milliGRAM(s)/deciliter  glucagon  Injectable 1 milliGRAM(s) IntraMuscular once PRN Glucose LESS THAN 70 milligrams/deciliter      Allergies    latex (Unknown)  Sulfacetamide Sodium (Unknown)    Intolerances        Vital Signs Last 24 Hrs  T(C): 36.7 (26 Jan 2019 04:10), Max: 37.1 (25 Jan 2019 21:26)  T(F): 98.1 (26 Jan 2019 04:10), Max: 98.8 (25 Jan 2019 21:26)  HR: 76 (26 Jan 2019 04:10) (72 - 80)  BP: 105/71 (26 Jan 2019 04:10) (103/66 - 105/71)  BP(mean): --  RR: 18 (26 Jan 2019 04:10) (17 - 18)  SpO2: 96% (26 Jan 2019 04:10) (96% - 99%)    I&O's Summary    25 Jan 2019 07:01  -  26 Jan 2019 07:00  --------------------------------------------------------  IN: 360 mL / OUT: 700 mL / NET: -340 mL        Physical Exam:  General: Well-appearing, NAD  HEENT: PERRL, EOMI, normal sclera and conjunctiva, normal oropharynx  Neck: Supple, no JVD, thyroid without masses or enlargement  Chest/Lungs: CTA bilaterally, no wheezing, rales, rhonchi or rub  Heart: RRR, normal S1, S2, no murmurs or gallops  Abdomen: Soft, ND, NTTP, normoactive bowel sounds  Extremities: 2+ peripheral pulses b/l, no edema, clubbing or cyanosis  Skin: Warm, well-perfused, no rashes or lesions  Neurological: A&Ox3, moves all extremities, no focal deficits    LABS:     01-26    140  |  108  |  95<H>  ----------------------------<  107<H>  4.0   |  18<L>  |  2.63<H>    Ca    9.1      26 Jan 2019 06:05  Phos  3.9     01-25  Mg     2.3     01-25    TPro  5.3<L>  /  Alb  1.6<L>  /  TBili  3.2<H>  /  DBili  x   /  AST  88<H>  /  ALT  40  /  AlkPhos  139<H>  01-26    LIVER FUNCTIONS - ( 26 Jan 2019 06:05 )  Alb: 1.6 g/dL / Pro: 5.3 g/dL / ALK PHOS: 139 U/L / ALT: 40 U/L / AST: 88 U/L / GGT: x           PT/INR - ( 25 Jan 2019 08:44 )   PT: 93.3 sec;   INR: 7.68 ratio                   ECG:    Telemetry (24 Hrs):    Echocardiogram:    IMAGING:    ASSESSMENT & PLAN:       Matheus Agudelo, PGY2 Emergency Medicine CCU Accept Note    Transfer from: (  ) Medicine    (  ) Telemetry    (  ) RCU                                        (  ) Palliative     (  ) Stroke Unit    (  ) _______________    Accepting Physican:    HOSPITAL COURSE:     Admission HPI:  79 y/o M patient with a history of CAD with past PCI, chronic atrial fibrillation on Coumadin (patient was told by his office physician to stop his Coumadin and go to the ER), essential HTN, unspecified cardiac valve disorder, reported unspecified hemoccult positive stools in the office, type 2 DM on metformin, with the patient self referring to [place] directed by his office physician following an elevated INR and episode of epistaxis this AM following apparently 3 months of progressive dyspnoea and B/L LE oedema with poor aerobic functional status with dyspnoea with changing his clothes or ambulation to the BR.   Patient denies chest pain/pressure.  NO dyspnoea at present.  NO headache, no focal weakness.   No abdominal pain, no red blood per rectum or melena.  No back pain, no tearing back pain.   NO dysuria, no haematuria.   Denies weight loss or anorexia.  No rash.   Notes 3-4 months of persistent scrotal oedema.  No penile pain, no scrotal pain.  No leg pain.  Remaining review of systems not contributory.              REVIEW OF SYSTEMS:   *ABLE TO ASSESS DUE TO PATIENT ALTERED MENTAL STATUS*    MEDICATIONS  (STANDING):  ALBUTerol/ipratropium for Nebulization 3 milliLiter(s) Nebulizer every 6 hours  amLODIPine   Tablet 5 milliGRAM(s) Oral daily  atorvastatin 40 milliGRAM(s) Oral at bedtime  clopidogrel Tablet 75 milliGRAM(s) Oral daily  dextrose 5%. 1000 milliLiter(s) (50 mL/Hr) IV Continuous <Continuous>  dextrose 50% Injectable 12.5 Gram(s) IV Push once  dextrose 50% Injectable 25 Gram(s) IV Push once  dextrose 50% Injectable 25 Gram(s) IV Push once  DOBUTamine Infusion 2 MICROgram(s)/kG/Min (5.784 mL/Hr) IV Continuous <Continuous>  finasteride 5 milliGRAM(s) Oral daily  furosemide   Injectable 40 milliGRAM(s) IV Push every 12 hours  insulin glargine Injectable (LANTUS) 9 Unit(s) SubCutaneous at bedtime  insulin lispro (HumaLOG) corrective regimen sliding scale   SubCutaneous three times a day before meals  insulin lispro (HumaLOG) corrective regimen sliding scale   SubCutaneous at bedtime  lactated ringers. 500 milliLiter(s) (500 mL/Hr) IV Continuous <Continuous>  nystatin Powder 1 Application(s) Topical two times a day    MEDICATIONS  (PRN):  acetaminophen   Tablet .. 650 milliGRAM(s) Oral every 6 hours PRN Moderate Pain (4 - 6)  dextrose 40% Gel 15 Gram(s) Oral once PRN Blood Glucose LESS THAN 70 milliGRAM(s)/deciliter  glucagon  Injectable 1 milliGRAM(s) IntraMuscular once PRN Glucose LESS THAN 70 milligrams/deciliter      Allergies  latex (Unknown)  Sulfacetamide Sodium (Unknown)      Vital Signs Last 24 Hrs  T(C): 36.7 (26 Jan 2019 04:10), Max: 37.1 (25 Jan 2019 21:26)  T(F): 98.1 (26 Jan 2019 04:10), Max: 98.8 (25 Jan 2019 21:26)  HR: 76 (26 Jan 2019 04:10) (72 - 80)  BP: 105/71 (26 Jan 2019 04:10) (103/66 - 105/71)  BP(mean): --  RR: 18 (26 Jan 2019 04:10) (17 - 18)  SpO2: 96% (26 Jan 2019 04:10) (96% - 99%)    I&O's Summary  25 Jan 2019 07:01  -  26 Jan 2019 07:00  --------------------------------------------------------  IN: 360 mL / OUT: 700 mL / NET: -340 mL      Physical Exam:  General: Well-appearing, NAD  HEENT: PERRL, EOMI, normal sclera and conjunctiva, normal oropharynx  Neck: Supple, no JVD, thyroid without masses or enlargement  Chest/Lungs: CTA bilaterally, no wheezing, rales, rhonchi or rub  Heart: RRR, normal S1, S2, no murmurs or gallops  Abdomen: Soft, ND, NTTP, normoactive bowel sounds  Extremities: 2+ peripheral pulses b/l, no edema, clubbing or cyanosis  Skin: Warm, well-perfused, no rashes or lesions  Neurological: A&Ox3, moves all extremities, no focal deficits    LABS:     01-26    140  |  108  |  95<H>  ----------------------------<  107<H>  4.0   |  18<L>  |  2.63<H>    Ca    9.1      26 Jan 2019 06:05  Phos  3.9     01-25  Mg     2.3     01-25    TPro  5.3<L>  /  Alb  1.6<L>  /  TBili  3.2<H>  /  DBili  x   /  AST  88<H>  /  ALT  40  /  AlkPhos  139<H>  01-26    LIVER FUNCTIONS - ( 26 Jan 2019 06:05 )  Alb: 1.6 g/dL / Pro: 5.3 g/dL / ALK PHOS: 139 U/L / ALT: 40 U/L / AST: 88 U/L / GGT: x           PT/INR - ( 25 Jan 2019 08:44 )   PT: 93.3 sec;   INR: 7.68 ratio           ECG:        Echocardiogram:    TTE 1/16/19  Conclusions:  1. Mitral annular calcification. Thickened and tethered mitral valve leaflets. Moderate mitral regurgitation.  2. Calcified aortic valve with decreased opening. Morphology is not well seen. Cannot rule out bicuspid valve. Peak transaortic valve gradient equals 71 mm Hg, mean transaortic valve gradient equals 38 mm Hg, estimated aortic valve area equals 0.8 sqcm (by continuity equation),aortic valve velocity time integral equals 99 cm, consistent with severe aortic stenosis. Mild aortic regurgitation.  3. Severely dilated left atrium.  LA volume index = 57 cc/m2.  4. Eccentric left ventricular hypertrophy (dilated left ventricle with normal relative wall thickness).  5. Mild global left ventricular systolic dysfunction. Flattening of the interventricular septum in both systole and diastole is  consistent with right ventricular pressure overload.  6. Right ventricular enlargement with decreased right ventricular systolic function.  7. Normal tricuspid valve. Moderate-severe tricuspid regurgitation.  8. Estimated pulmonary artery systolic pressure equals 71 mm Hg, assuming right atrial pressure equals 10 mm Hg, consistent with severe pulmonary pressures.  *** No previous Echo exam.      IMAGING:    CT Chest 1/16/19  IMPRESSION:   1.  Large right and small left pleural effusions measuring simple fluid. Mild interlobular septal thickening representing mild pulmonary edema.  2.  Coronary atherosclerosis.  3.  Aortic valve leaflet calcification.  4.  Maple artery is dilated and may represent pulmonary hypertension.  5.  Cirrhotic appearance of the liver. Ascites. Anasarca.    US Scrotum/Testicles 1/16/19  IMPRESSION:   Significant scrotal edema with moderate right and small left hydrocele. No focal collection. Left varicocele is present.    US Kidney/Bladder 1/16/19  IMPRESSION:   No hydronephrosis. Increased echogenicity of both kidneys consistent with medical renal disease. Trace ascites. Left pleural effusion.    CXR 1/17/19  IMPRESSION:   Increase in pulmonary edema with bilateral pleural effusions.    Duplex Carotids 1/24/19  IMPRESSION:   No hemodynamically significant stenosis within the bilateral carotid arteries.    CT Heart 1/24/19  IMPRESSION:   1.  TAVR measurements as above.  2.  Moderate bilateral pleural effusions.  3.  High density foci in the pelvis may be related to the prostate or bladder stones.  4.  Foci of air in the bladder, correlate for recent procedure.    US Abd 1/25/19  IMPRESSION:   Cyst in the pancreatic head with mild prominence of the pancreatic duct.  Layering sludge in the gallbladder. Bilateral pleural effusions and small volume      ASSESSMENT & PLAN:     #Neuro:    - Currently altered, decreased mental status    #CV:  [Acute on Chronic Systolic CHF]      - Daily weights  - Strict I/Os      [Chronic Afib]      -   - Supratherapeutic INR, hold further AC at this time   -  -      [Aortic Stenosis]  - TTE with  -  -  - Structural Heart following         [HYPOtension]  -  - On Levo @0.2, titrate to MAP >65  -      #Pulm:  -  - CXR with   -  -  -    #GI:  - Positive fecal occult blood   - NPO until mental status improves        #Renal / Electrolytes:  [ELADIA}  -  -  -  - Trend labs per CCU protocol  - Nephrology following, further recs appreciated    #ID:  [Fever]  -  -  - F/u repeat labs, Bcx  - Tylenol for fever q6hrs PRN  -   -      #Heme:  [Supra-therapeutic INR]  -  - Hold home coumadin  - Trend labs per CCU protocol  -    #Endocrine:  [Type 2 DM]  -  -  - ISS  - Check FS pre-meals, bedtime               Matheus Dym, PGY2 Emergency Medicine CCU Accept Note    Transfer from: (  ) Medicine    (  ) Telemetry    (  ) RCU                                        (  ) Palliative     (  ) Stroke Unit    (  ) _______________    Accepting Physican:    HOSPITAL COURSE:     Admission HPI:  79 y/o M patient with a history of CAD with past PCI, chronic atrial fibrillation on Coumadin (patient was told by his office physician to stop his Coumadin and go to the ER), essential HTN, unspecified cardiac valve disorder, reported unspecified hemoccult positive stools in the office, type 2 DM on metformin, with the patient self referring to [place] directed by his office physician following an elevated INR and episode of epistaxis this AM following apparently 3 months of progressive dyspnoea and B/L LE oedema with poor aerobic functional status with dyspnoea with changing his clothes or ambulation to the BR.   Patient denies chest pain/pressure.  NO dyspnoea at present.  NO headache, no focal weakness.   No abdominal pain, no red blood per rectum or melena.  No back pain, no tearing back pain.   NO dysuria, no haematuria.   Denies weight loss or anorexia.  No rash.   Notes 3-4 months of persistent scrotal oedema.  No penile pain, no scrotal pain.  No leg pain.  Remaining review of systems not contributory.      Morning of 1/26/19: RRT on floor, hypotensive, febrile. levo started. transferred to CCU for further care.         REVIEW OF SYSTEMS:   *ABLE TO ASSESS DUE TO PATIENT ALTERED MENTAL STATUS*    MEDICATIONS  (STANDING):  ALBUTerol/ipratropium for Nebulization 3 milliLiter(s) Nebulizer every 6 hours  amLODIPine   Tablet 5 milliGRAM(s) Oral daily  atorvastatin 40 milliGRAM(s) Oral at bedtime  clopidogrel Tablet 75 milliGRAM(s) Oral daily  dextrose 5%. 1000 milliLiter(s) (50 mL/Hr) IV Continuous <Continuous>  dextrose 50% Injectable 12.5 Gram(s) IV Push once  dextrose 50% Injectable 25 Gram(s) IV Push once  dextrose 50% Injectable 25 Gram(s) IV Push once  DOBUTamine Infusion 2 MICROgram(s)/kG/Min (5.784 mL/Hr) IV Continuous <Continuous>  finasteride 5 milliGRAM(s) Oral daily  furosemide   Injectable 40 milliGRAM(s) IV Push every 12 hours  insulin glargine Injectable (LANTUS) 9 Unit(s) SubCutaneous at bedtime  insulin lispro (HumaLOG) corrective regimen sliding scale   SubCutaneous three times a day before meals  insulin lispro (HumaLOG) corrective regimen sliding scale   SubCutaneous at bedtime  lactated ringers. 500 milliLiter(s) (500 mL/Hr) IV Continuous <Continuous>  nystatin Powder 1 Application(s) Topical two times a day    MEDICATIONS  (PRN):  acetaminophen   Tablet .. 650 milliGRAM(s) Oral every 6 hours PRN Moderate Pain (4 - 6)  dextrose 40% Gel 15 Gram(s) Oral once PRN Blood Glucose LESS THAN 70 milliGRAM(s)/deciliter  glucagon  Injectable 1 milliGRAM(s) IntraMuscular once PRN Glucose LESS THAN 70 milligrams/deciliter      Allergies  latex (Unknown)  Sulfacetamide Sodium (Unknown)      Vital Signs Last 24 Hrs  T(C): 36.7 (26 Jan 2019 04:10), Max: 37.1 (25 Jan 2019 21:26)  T(F): 98.1 (26 Jan 2019 04:10), Max: 98.8 (25 Jan 2019 21:26)  HR: 76 (26 Jan 2019 04:10) (72 - 80)  BP: 105/71 (26 Jan 2019 04:10) (103/66 - 105/71)  BP(mean): --  RR: 18 (26 Jan 2019 04:10) (17 - 18)  SpO2: 96% (26 Jan 2019 04:10) (96% - 99%)    I&O's Summary  25 Jan 2019 07:01  -  26 Jan 2019 07:00  --------------------------------------------------------  IN: 360 mL / OUT: 700 mL / NET: -340 mL      Physical Exam:  General: Well-appearing, NAD  HEENT: PERRL, EOMI, normal sclera and conjunctiva, normal oropharynx  Neck: Supple, no JVD, thyroid without masses or enlargement  Chest/Lungs: CTA bilaterally, no wheezing, rales, rhonchi or rub  Heart: RRR, normal S1, S2, no murmurs or gallops  Abdomen: Soft, ND, NTTP, normoactive bowel sounds  Extremities: 2+ peripheral pulses b/l, no edema, clubbing or cyanosis  Skin: Warm, well-perfused, no rashes or lesions  Neurological: A&Ox3, moves all extremities, no focal deficits    LABS:     01-26    140  |  108  |  95<H>  ----------------------------<  107<H>  4.0   |  18<L>  |  2.63<H>    Ca    9.1      26 Jan 2019 06:05  Phos  3.9     01-25  Mg     2.3     01-25    TPro  5.3<L>  /  Alb  1.6<L>  /  TBili  3.2<H>  /  DBili  x   /  AST  88<H>  /  ALT  40  /  AlkPhos  139<H>  01-26    LIVER FUNCTIONS - ( 26 Jan 2019 06:05 )  Alb: 1.6 g/dL / Pro: 5.3 g/dL / ALK PHOS: 139 U/L / ALT: 40 U/L / AST: 88 U/L / GGT: x           PT/INR - ( 25 Jan 2019 08:44 )   PT: 93.3 sec;   INR: 7.68 ratio           ECG:        Echocardiogram:    TTE 1/16/19  Conclusions:  1. Mitral annular calcification. Thickened and tethered mitral valve leaflets. Moderate mitral regurgitation.  2. Calcified aortic valve with decreased opening. Morphology is not well seen. Cannot rule out bicuspid valve. Peak transaortic valve gradient equals 71 mm Hg, mean transaortic valve gradient equals 38 mm Hg, estimated aortic valve area equals 0.8 sqcm (by continuity equation),aortic valve velocity time integral equals 99 cm, consistent with severe aortic stenosis. Mild aortic regurgitation.  3. Severely dilated left atrium.  LA volume index = 57 cc/m2.  4. Eccentric left ventricular hypertrophy (dilated left ventricle with normal relative wall thickness).  5. Mild global left ventricular systolic dysfunction. Flattening of the interventricular septum in both systole and diastole is  consistent with right ventricular pressure overload.  6. Right ventricular enlargement with decreased right ventricular systolic function.  7. Normal tricuspid valve. Moderate-severe tricuspid regurgitation.  8. Estimated pulmonary artery systolic pressure equals 71 mm Hg, assuming right atrial pressure equals 10 mm Hg, consistent with severe pulmonary pressures.  *** No previous Echo exam.      IMAGING:    CT Chest 1/16/19  IMPRESSION:   1.  Large right and small left pleural effusions measuring simple fluid. Mild interlobular septal thickening representing mild pulmonary edema.  2.  Coronary atherosclerosis.  3.  Aortic valve leaflet calcification.  4.  Maple artery is dilated and may represent pulmonary hypertension.  5.  Cirrhotic appearance of the liver. Ascites. Anasarca.    US Scrotum/Testicles 1/16/19  IMPRESSION:   Significant scrotal edema with moderate right and small left hydrocele. No focal collection. Left varicocele is present.    US Kidney/Bladder 1/16/19  IMPRESSION:   No hydronephrosis. Increased echogenicity of both kidneys consistent with medical renal disease. Trace ascites. Left pleural effusion.    CXR 1/17/19  IMPRESSION:   Increase in pulmonary edema with bilateral pleural effusions.    Duplex Carotids 1/24/19  IMPRESSION:   No hemodynamically significant stenosis within the bilateral carotid arteries.    CT Heart 1/24/19  IMPRESSION:   1.  TAVR measurements as above.  2.  Moderate bilateral pleural effusions.  3.  High density foci in the pelvis may be related to the prostate or bladder stones.  4.  Foci of air in the bladder, correlate for recent procedure.    US Abd 1/25/19  IMPRESSION:   Cyst in the pancreatic head with mild prominence of the pancreatic duct.  Layering sludge in the gallbladder. Bilateral pleural effusions and small volume      ASSESSMENT & PLAN:     #Neuro:  - Reportedly A+O x4 on initial hospital presentation  -  - Currently altered, decreased mental status    #CV:  [Acute on Chronic Systolic CHF]  -  -  - TTE 1/16/19: Eccentric LV hypertrophy (dilated LV with normal relative wall thickness), dilated LA, mild global LV systolic dysfunction, RV enlargement with decreased RV systolic function.  -  -  - Daily weights  - Strict I/Os      [Chronic Afib]      -   - Supratherapeutic INR, hold further AC at this time   -  -      [Aortic Stenosis]  - TTE 1/16/19 with severe aortic stenosis  -  -  - Structural Heart following     [Polymorphic Vtach]  - Episodes of polymorphic Vtach on floors  - S/p IV magnesium     [HYPOtension]  -  - On Levo @0.2, titrate to MAP >65  -      #Pulm:  -  - CXR with   - CT Chest with   -  -    #GI:  [GI bleed]    - Positive fecal occult blood       - NPO until mental status improves        #Renal / Electrolytes:  [ELADIA}  -  -  - Renal US 1/16: No hydronephrosis. Increased echogenicity of both kidneys consistent with medical renal disease.  - Trend labs per CCU protocol  - Nephrology following, further recs appreciated    #ID:  [Fever]  -  -  - F/u repeat labs, Bcx  - Tylenol for fever q6hrs PRN  -   -      #Heme:  [Supra-therapeutic INR]  - Epistaxis on initial hospital presentation, now resolved  - S/p Vitamin K on XXXXXX  - Hold home coumadin  - Trend labs per CCU protocol  -    #Endocrine:  [Type 2 DM]  -  -  - ISS  - Check FS pre-meals, bedtime               Matheus Agudelo, PGY2 Emergency Medicine CCU Accept Note    Transfer from: (  ) Medicine    (  ) Telemetry    (  ) RCU                                        (  ) Palliative     (  ) Stroke Unit    (  ) _______________    Accepting Physican:    HOSPITAL COURSE:     HPI:  79 y/o M patient with a history of HTN, CHF, CAD s/p PCI, AFib on Coumadin, DM and severe symptomatic Aortic Stenosis, reported unspecified hemoccult positive stools in the office, type 2 DM on metformin, with the patient self referring to [place] directed by his office physician following an elevated INR and episode of epistaxis this AM following apparently 3 months of progressive dyspnoea and B/L LE oedema with poor aerobic functional status with dyspnoea with changing his clothes or ambulation to the BR.   Patient denies chest pain/pressure.  NO dyspnoea at present.  NO headache, no focal weakness.   No abdominal pain, no red blood per rectum or melena.  No back pain, no tearing back pain.   NO dysuria, no haematuria.   Denies weight loss or anorexia.  No rash.   Notes 3-4 months of persistent scrotal oedema.  No penile pain, no scrotal pain.  No leg pain.  Remaining review of systems not contributory.    The patient was admitted to the hospital for acute decompensated heart failure and begun on lasix diuresis. Complicated by acute hypoxic respiratory failure.  Required HiFlo / NC for oxygenation on floor, no hx of home O2. Patient underwent significant diuresis on flood with improvement on edema. Patient was followed by nephrology due to ELADIA. Patient had improvement in overall clinical status - improved breathing, edema. Transitioned off lasix drip to lasix TID. ELADIA slowly improved. Eval'd by Str Heart for AVR, underwent carotid duplex and CT heart. On 1/25, episode of HYPOtension to 78/50, patient endorsed dizziness. Suspected in context of over-diuresis, was given gentle hydration, patient improved.     Morning of 1/26/19: RRT on floor, patient noted to be hypotensive, unarousable to sternal rub, febrile. Begun on NC due to SpO2 of 91% on RA,  levo started. EKG without acute ischemic changes. Transferred to CCU for further care.           REVIEW OF SYSTEMS:   *ABLE TO ASSESS DUE TO PATIENT ALTERED MENTAL STATUS*    MEDICATIONS  (STANDING):  ALBUTerol/ipratropium for Nebulization 3 milliLiter(s) Nebulizer every 6 hours  amLODIPine   Tablet 5 milliGRAM(s) Oral daily  atorvastatin 40 milliGRAM(s) Oral at bedtime  clopidogrel Tablet 75 milliGRAM(s) Oral daily  dextrose 5%. 1000 milliLiter(s) (50 mL/Hr) IV Continuous <Continuous>  dextrose 50% Injectable 12.5 Gram(s) IV Push once  dextrose 50% Injectable 25 Gram(s) IV Push once  dextrose 50% Injectable 25 Gram(s) IV Push once  DOBUTamine Infusion 2 MICROgram(s)/kG/Min (5.784 mL/Hr) IV Continuous <Continuous>  finasteride 5 milliGRAM(s) Oral daily  furosemide   Injectable 40 milliGRAM(s) IV Push every 12 hours  insulin glargine Injectable (LANTUS) 9 Unit(s) SubCutaneous at bedtime  insulin lispro (HumaLOG) corrective regimen sliding scale   SubCutaneous three times a day before meals  insulin lispro (HumaLOG) corrective regimen sliding scale   SubCutaneous at bedtime  lactated ringers. 500 milliLiter(s) (500 mL/Hr) IV Continuous <Continuous>  nystatin Powder 1 Application(s) Topical two times a day    MEDICATIONS  (PRN):  acetaminophen   Tablet .. 650 milliGRAM(s) Oral every 6 hours PRN Moderate Pain (4 - 6)  dextrose 40% Gel 15 Gram(s) Oral once PRN Blood Glucose LESS THAN 70 milliGRAM(s)/deciliter  glucagon  Injectable 1 milliGRAM(s) IntraMuscular once PRN Glucose LESS THAN 70 milligrams/deciliter      Allergies  latex (Unknown)  Sulfacetamide Sodium (Unknown)      Vital Signs Last 24 Hrs  T(C): 36.7 (26 Jan 2019 04:10), Max: 37.1 (25 Jan 2019 21:26)  T(F): 98.1 (26 Jan 2019 04:10), Max: 98.8 (25 Jan 2019 21:26)  HR: 76 (26 Jan 2019 04:10) (72 - 80)  BP: 105/71 (26 Jan 2019 04:10) (103/66 - 105/71)  BP(mean): --  RR: 18 (26 Jan 2019 04:10) (17 - 18)  SpO2: 96% (26 Jan 2019 04:10) (96% - 99%)    I&O's Summary  25 Jan 2019 07:01  -  26 Jan 2019 07:00  --------------------------------------------------------  IN: 360 mL / OUT: 700 mL / NET: -340 mL      Physical Exam:  General:   HEENT:   Neck: Supple, no JVD, thyroid without masses or enlargement  Chest/Lungs:   Heart:   Abdomen: Soft, ND, NTTP, normoactive bowel sounds  Extremities:  Skin:   Neurological:     LABS:     01-26    140  |  108  |  95<H>  ----------------------------<  107<H>  4.0   |  18<L>  |  2.63<H>    Ca    9.1      26 Jan 2019 06:05  Phos  3.9     01-25  Mg     2.3     01-25    TPro  5.3<L>  /  Alb  1.6<L>  /  TBili  3.2<H>  /  DBili  x   /  AST  88<H>  /  ALT  40  /  AlkPhos  139<H>  01-26    LIVER FUNCTIONS - ( 26 Jan 2019 06:05 )  Alb: 1.6 g/dL / Pro: 5.3 g/dL / ALK PHOS: 139 U/L / ALT: 40 U/L / AST: 88 U/L / GGT: x         PT/INR - ( 25 Jan 2019 08:44 )   PT: 93.3 sec;   INR: 7.68 ratio    -------------------------------------------------------------------     Echocardiogram:  TTE 1/16/19  Conclusions:  1. Mitral annular calcification. Thickened and tethered mitral valve leaflets. Moderate mitral regurgitation.  2. Calcified aortic valve with decreased opening. Morphology is not well seen. Cannot rule out bicuspid valve. Peak transaortic valve gradient equals 71 mm Hg, mean transaortic valve gradient equals 38 mm Hg, estimated aortic valve area equals 0.8 sqcm (by continuity equation),aortic valve velocity time integral equals 99 cm, consistent with severe aortic stenosis. Mild aortic regurgitation.  3. Severely dilated left atrium.  LA volume index = 57 cc/m2.  4. Eccentric left ventricular hypertrophy (dilated left ventricle with normal relative wall thickness).  5. Mild global left ventricular systolic dysfunction. Flattening of the interventricular septum in both systole and diastole is  consistent with right ventricular pressure overload.  6. Right ventricular enlargement with decreased right ventricular systolic function.  7. Normal tricuspid valve. Moderate-severe tricuspid regurgitation.  8. Estimated pulmonary artery systolic pressure equals 71 mm Hg, assuming right atrial pressure equals 10 mm Hg, consistent with severe pulmonary pressures.  *** No previous Echo exam.  -------------------------------------------------------------------    IMAGING:   CXR 1/26/19  IMPRESSION:   Pulmonary edema not significantly changed from prior.  Bilateral pleural effusions also unchanged.    CT Chest 1/16/19  IMPRESSION:   1.  Large right and small left pleural effusions measuring simple fluid. Mild interlobular septal thickening representing mild pulmonary edema.  2.  Coronary atherosclerosis.  3.  Aortic valve leaflet calcification.  4.  Maple artery is dilated and may represent pulmonary hypertension.  5.  Cirrhotic appearance of the liver. Ascites. Anasarca.    US Scrotum/Testicles 1/16/19  IMPRESSION:   Significant scrotal edema with moderate right and small left hydrocele. No focal collection. Left varicocele is present.    US Kidney/Bladder 1/16/19  IMPRESSION:   No hydronephrosis. Increased echogenicity of both kidneys consistent with medical renal disease. Trace ascites. Left pleural effusion.    CXR 1/17/19  IMPRESSION:   Increase in pulmonary edema with bilateral pleural effusions.    Duplex Carotids 1/24/19  IMPRESSION:   No hemodynamically significant stenosis within the bilateral carotid arteries.    CT Heart 1/24/19  IMPRESSION:   1.  TAVR measurements as above.  2.  Moderate bilateral pleural effusions.  3.  High density foci in the pelvis may be related to the prostate or bladder stones.  4.  Foci of air in the bladder, correlate for recent procedure.    US Abd 1/25/19  IMPRESSION:   Cyst in the pancreatic head with mild prominence of the pancreatic duct.  Layering sludge in the gallbladder. Bilateral pleural effusions and small volume  ----------------------------------------------------------------------    ASSESSMENT & PLAN:   78M with PMH of CAD s/p DAYANA to mLAD and POBA to D2 5/2017 with Dr. Harris, AFib on warfarin, T2DM, HLD, HTN who presented from home with worsening PEREZ and LE edema x3 months. TTE revealed EF 45%, severe AS, mild global LV dysfunction and severe pHTN. Now with worsening hypotension, AMS, possible sepsis.     #Neuro:  - Reportedly A+O x4 on initial hospital presentation  - Currently altered, decreased mental status  - Possibly in context of sepsis   - Close neuro checks q1hr     #CV:  [Acute on Chronic Systolic CHF]  - Worsening PEREZ, lower extremity edema x3 months   - TTE 1/16/19: Eccentric LV hypertrophy (dilated LV with normal relative wall thickness), dilated LA, mild global LV systolic dysfunction, RV enlargement with decreased RV systolic function.  - Being managed with lasix on floor for diuresis   - Hold further diuresis in context of hypotension   - Daily weights  - Strict I/Os    [CAD]  - s/p DAYANA to mLAD and POBA to D2 5/2017  - Continue plavix, atorvostatin (no ASA as per cardiology)  - Will require repeat right/left heart cath (pending clinical improvement)    [Aortic Stenosis]  - TTE 1/16/19 with severe aortic stenosis  - Undergoing workup for TAVR   - CT heart and carotid dopplers performed (results as above)  - Per Structural [facility], will require right/left heart cath to complete TAVR evaluation (pending clinical improvement)  - Structural Heart following     [Chronic Afib]  - CHADS2-VASc - 6  - On home coumadin  - Supratherapeutic INR, hold further AC at this time   - S/p Vit K supplementation x 2  - Continue to HOLD home coreg due to periods of bradycardia on floor    [Polymorphic Vtach]  - Episodes of polymorphic Vtach on floors (always <10 beats)  - S/p IV magnesium   - Telemetry monitoring at bedside     [HYPOtension]  - Prior hx of HTN on Norvasc   - Currently on Levo @0.2, titrate to MAP >65  - Possibly in context of sepsis   - May require central line access if requires long-term pressor support   - HOLD lasix, norvasc at this time   - Administer 250cc fluid bolus x1 (concern for distributive shock in context of possible sepsis)  - Close hemodynamic monitoring    #Pulm:  - Noted to have Acute respiratory failure with hypoxia, requiring HiFlo / NC on floors  - Now requiring non-rebreather in CCU  - CXR 1/26 with ongoing pulmonary edema, bilateral pleural effusions   - CT Chest 1/16 with large right and small left pleural effusions, mild pulmonary edema  - Daily CXR or as clinically warranted     #GI:  [GI bleed]  - Cirrhosis, ascites on CT; likely in context of right sided heart failure  - Positive fecal occult blood   -  - NPO until mental status improves  - May require KO tube placement   -       #Renal / Electrolytes:  [ELADIA]  - Suspected cardiorenal in nature   - S/p Lasix drip on floor  - Creatinine overall trending down since admission  - HYPOkalemic on floor requiring potassium supplementation (now normokalemic)   - Renal US 1/16: No hydronephrosis. Increased echogenicity of both kidneys consistent with medical renal disease.  - Per Renal, avoid nephrotoxic agents including NSAIDs, RCA, ACE/ARB; hold oral hypoglycemics including Metformin in setting of ELADIA  - Trend labs per CCU protocol  - Nephrology following, further recs appreciated     #ID:  [Fever]  - Febrile, hypotensive  - WBC elevated to 17  - F/u repeat labs, Bcx/UA  - Tylenol for fever q6hrs PRN  - Vanc/Zosyn empirically due to concern for sepsis     #Heme:  [Supra-therapeutic INR]  - Epistaxis on initial hospital presentation, now resolved  - S/p Vitamin K supplementation x2   - Continue to hold home coumadin  - No active bleeding noted   - Trend labs per CCU protocol    #Endocrine:  [Type 2 DM]  - No prior long term use of insulin   - Lantus 9u bedtime, ISS  - Check FS pre-meals, bedtime     #Scrotal edema:  - Likely component of patient's anasarca>>US with hydrocele.   - Continue to elevate and monitor.           Matheus Agudelo, PGY2 Emergency Medicine CCU Accept Note    Transfer from: (  ) Medicine    (  ) Telemetry    (  ) RCU                                        (  ) Palliative     (  ) Stroke Unit    (  ) _______________    Accepting Physican:    HOSPITAL COURSE:     HPI:  79 y/o M patient with a history of HTN, CHF, CAD s/p PCI, AFib on Coumadin, DM and severe symptomatic Aortic Stenosis, reported unspecified hemoccult positive stools in the office, type 2 DM on metformin, with the patient self referring to [facility] directed by his office physician following an elevated INR and episode of epistaxis this AM following apparently 3 months of progressive dyspnoea and B/L LE oedema with poor aerobic functional status with dyspnoea with changing his clothes or ambulation to the BR.   Patient denies chest pain/pressure.  NO dyspnoea at present.  NO headache, no focal weakness.   No abdominal pain, no red blood per rectum or melena.  No back pain, no tearing back pain.   NO dysuria, no haematuria.   Denies weight loss or anorexia.  No rash.   Notes 3-4 months of persistent scrotal oedema.  No penile pain, no scrotal pain.  No leg pain.  Remaining review of systems not contributory.    The patient was admitted to the hospital for acute decompensated heart failure and begun on lasix diuresis. Complicated by acute hypoxic respiratory failure.  Required HiFlo / NC for oxygenation on floor, no hx of home O2. Patient underwent significant diuresis on flood with improvement on edema. Patient was followed by nephrology due to ELADIA. Patient had improvement in overall clinical status - improved breathing, edema. Transitioned off lasix drip to lasix TID. ELADIA slowly improved. Eval'd by Str Heart for AVR, underwent carotid duplex and CT heart. On 1/25, episode of HYPOtension to 78/50, patient endorsed dizziness. Suspected in context of over-diuresis, was given gentle hydration, patient improved.     Morning of 1/26/19: RRT on floor, patient noted to be hypotensive, unarousable to sternal rub, febrile. Begun on NC due to SpO2 of 91% on RA,  levo started. EKG without acute ischemic changes. Transferred to CCU for further care.           REVIEW OF SYSTEMS:   *ABLE TO ASSESS DUE TO PATIENT ALTERED MENTAL STATUS*    MEDICATIONS  (STANDING):  ALBUTerol/ipratropium for Nebulization 3 milliLiter(s) Nebulizer every 6 hours  amLODIPine   Tablet 5 milliGRAM(s) Oral daily  atorvastatin 40 milliGRAM(s) Oral at bedtime  clopidogrel Tablet 75 milliGRAM(s) Oral daily  dextrose 5%. 1000 milliLiter(s) (50 mL/Hr) IV Continuous <Continuous>  dextrose 50% Injectable 12.5 Gram(s) IV Push once  dextrose 50% Injectable 25 Gram(s) IV Push once  dextrose 50% Injectable 25 Gram(s) IV Push once  DOBUTamine Infusion 2 MICROgram(s)/kG/Min (5.784 mL/Hr) IV Continuous <Continuous>  finasteride 5 milliGRAM(s) Oral daily  furosemide   Injectable 40 milliGRAM(s) IV Push every 12 hours  insulin glargine Injectable (LANTUS) 9 Unit(s) SubCutaneous at bedtime  insulin lispro (HumaLOG) corrective regimen sliding scale   SubCutaneous three times a day before meals  insulin lispro (HumaLOG) corrective regimen sliding scale   SubCutaneous at bedtime  lactated ringers. 500 milliLiter(s) (500 mL/Hr) IV Continuous <Continuous>  nystatin Powder 1 Application(s) Topical two times a day    MEDICATIONS  (PRN):  acetaminophen   Tablet .. 650 milliGRAM(s) Oral every 6 hours PRN Moderate Pain (4 - 6)  dextrose 40% Gel 15 Gram(s) Oral once PRN Blood Glucose LESS THAN 70 milliGRAM(s)/deciliter  glucagon  Injectable 1 milliGRAM(s) IntraMuscular once PRN Glucose LESS THAN 70 milligrams/deciliter      Allergies  latex (Unknown)  Sulfacetamide Sodium (Unknown)      Vital Signs Last 24 Hrs  T(C): 36.7 (26 Jan 2019 04:10), Max: 37.1 (25 Jan 2019 21:26)  T(F): 98.1 (26 Jan 2019 04:10), Max: 98.8 (25 Jan 2019 21:26)  HR: 76 (26 Jan 2019 04:10) (72 - 80)  BP: 105/71 (26 Jan 2019 04:10) (103/66 - 105/71)  BP(mean): --  RR: 18 (26 Jan 2019 04:10) (17 - 18)  SpO2: 96% (26 Jan 2019 04:10) (96% - 99%)    I&O's Summary  25 Jan 2019 07:01  -  26 Jan 2019 07:00  --------------------------------------------------------  IN: 360 mL / OUT: 700 mL / NET: -340 mL      Physical Exam:  General:   HEENT:   Neck: Supple, no JVD, thyroid without masses or enlargement  Chest/Lungs:   Heart:   Abdomen: Soft, ND, NTTP, normoactive bowel sounds  Extremities:  Skin:   Neurological:     LABS:     01-26    140  |  108  |  95<H>  ----------------------------<  107<H>  4.0   |  18<L>  |  2.63<H>    Ca    9.1      26 Jan 2019 06:05  Phos  3.9     01-25  Mg     2.3     01-25    TPro  5.3<L>  /  Alb  1.6<L>  /  TBili  3.2<H>  /  DBili  x   /  AST  88<H>  /  ALT  40  /  AlkPhos  139<H>  01-26    LIVER FUNCTIONS - ( 26 Jan 2019 06:05 )  Alb: 1.6 g/dL / Pro: 5.3 g/dL / ALK PHOS: 139 U/L / ALT: 40 U/L / AST: 88 U/L / GGT: x         PT/INR - ( 25 Jan 2019 08:44 )   PT: 93.3 sec;   INR: 7.68 ratio    -------------------------------------------------------------------     Echocardiogram:  TTE 1/16/19  Conclusions:  1. Mitral annular calcification. Thickened and tethered mitral valve leaflets. Moderate mitral regurgitation.  2. Calcified aortic valve with decreased opening. Morphology is not well seen. Cannot rule out bicuspid valve. Peak transaortic valve gradient equals 71 mm Hg, mean transaortic valve gradient equals 38 mm Hg, estimated aortic valve area equals 0.8 sqcm (by continuity equation),aortic valve velocity time integral equals 99 cm, consistent with severe aortic stenosis. Mild aortic regurgitation.  3. Severely dilated left atrium.  LA volume index = 57 cc/m2.  4. Eccentric left ventricular hypertrophy (dilated left ventricle with normal relative wall thickness).  5. Mild global left ventricular systolic dysfunction. Flattening of the interventricular septum in both systole and diastole is  consistent with right ventricular pressure overload.  6. Right ventricular enlargement with decreased right ventricular systolic function.  7. Normal tricuspid valve. Moderate-severe tricuspid regurgitation.  8. Estimated pulmonary artery systolic pressure equals 71 mm Hg, assuming right atrial pressure equals 10 mm Hg, consistent with severe pulmonary pressures.  *** No previous Echo exam.  -------------------------------------------------------------------    IMAGING:   CXR 1/26/19  IMPRESSION:   Pulmonary edema not significantly changed from prior.  Bilateral pleural effusions also unchanged.    CT Chest 1/16/19  IMPRESSION:   1.  Large right and small left pleural effusions measuring simple fluid. Mild interlobular septal thickening representing mild pulmonary edema.  2.  Coronary atherosclerosis.  3.  Aortic valve leaflet calcification.  4.  Maple artery is dilated and may represent pulmonary hypertension.  5.  Cirrhotic appearance of the liver. Ascites. Anasarca.    US Scrotum/Testicles 1/16/19  IMPRESSION:   Significant scrotal edema with moderate right and small left hydrocele. No focal collection. Left varicocele is present.    US Kidney/Bladder 1/16/19  IMPRESSION:   No hydronephrosis. Increased echogenicity of both kidneys consistent with medical renal disease. Trace ascites. Left pleural effusion.    CXR 1/17/19  IMPRESSION:   Increase in pulmonary edema with bilateral pleural effusions.    Duplex Carotids 1/24/19  IMPRESSION:   No hemodynamically significant stenosis within the bilateral carotid arteries.    CT Heart 1/24/19  IMPRESSION:   1.  TAVR measurements as above.  2.  Moderate bilateral pleural effusions.  3.  High density foci in the pelvis may be related to the prostate or bladder stones.  4.  Foci of air in the bladder, correlate for recent procedure.    US Abd 1/25/19  IMPRESSION:   Cyst in the pancreatic head with mild prominence of the pancreatic duct.  Layering sludge in the gallbladder. Bilateral pleural effusions and small volume  ----------------------------------------------------------------------    ASSESSMENT & PLAN:   78M with PMH of CAD s/p DAYANA to mLAD and POBA to D2 5/2017 with Dr. Harris, AFib on warfarin, T2DM, HLD, HTN who presented from home with worsening PEREZ and LE edema x3 months. TTE revealed EF 45%, severe AS, mild global LV dysfunction and severe pHTN. Now with worsening hypotension, AMS, possible sepsis.     #Neuro:  - Reportedly A+O x4 on initial hospital presentation  - Currently altered, decreased mental status. Awake but NOR alert or oriented at this time   - Possibly in context of sepsis VERSUS Hypoperfusion due to decreased circulating volume secondary to over-diuresis VERSUS other unknown etiology at this time  - Close neuro checks q1hr     #CV:  [Acute on Chronic Systolic CHF]  - Worsening PEREZ, lower extremity edema x3 months on hospital presentation  - TTE 1/16/19: Eccentric LV hypertrophy (dilated LV with normal relative wall thickness), dilated LA, mild global LV systolic dysfunction, RV enlargement with decreased RV systolic function.  - Being managed with lasix drip -> lasix TID on floor for diuresis, with signs of clinical improvement   - Hold further diuresis in context of ongoing hypotension, hypotension may be in context of over-diuresis   - Daily weights  - Strict I/Os    [CAD]  - s/p DAYANA to mLAD and POBA to D2 5/2017  - Continue plavix, atorvostatin (no ASA as per cardiology)  - Will require repeat right/left heart cath (pending clinical improvement) as per [facility] (see below)    [Aortic Stenosis]  - TTE 1/16/19 with severe aortic stenosis  - May be etiology of worsening heart failure / PEREZ   - Undergoing workup for TAVR   - CT heart and carotid dopplers performed (results as above)  - Per [facility], will require right/left heart cath to complete TAVR evaluation (pending clinical improvement)  - Structural Heart following, further recs appreciated     [Chronic Afib]  - CHADS2-VASc - 6  - On home coumadin  - Supratherapeutic INR on admission and ongoing, hold further AC at this time   - S/p Vit K supplementation x 2  - Continue to HOLD home coreg due to periods of bradycardia on floor  - Labs daily to trend INR    [Polymorphic Vtach]  - Episodes of polymorphic Vtach on floors (always <10 beats)  - S/p IV magnesium x1   - Telemetry monitoring at bedside     [HYPOtension]  - Prior hx of HTN on Norvasc   - Currently on Levo @0.2, titrate to MAP >65  - Possibly in context of sepsis   - May require central line access if requires long-term pressor support   - HOLD further lasix, norvasc at this time   - Administer 250cc fluid bolus x1 (concern for distributive shock in context of possible sepsis - administer fluids cautiously given hx of severe AS)  - Close hemodynamic monitoring    #Pulm:  - Noted on admisison to have acute respiratory failure with hypoxia, requiring HiFlo / NC on floors  - Now requiring non-rebreather in CCU for respiratory support  - CXR 1/26 with ongoing pulmonary edema, bilateral pleural effusions   - CT Chest 1/16 with large right and small left pleural effusions, mild pulmonary edema  - Repeat CXR as clinically needed     #GI:  [GI bleed]  - Cirrhosis, ascites on CT; likely in context of right sided heart failure  - Positive fecal occult blood; possibly in context of supratherapeutic INR  - Trend H/H  - NPO until mental status improves  - May require KO tube placement     #Renal / Electrolytes:  [ELADIA]  - Suspected cardiorenal in nature   - S/p Lasix drip on floor  - Creatinine overall trending down since admission  - HYPOkalemic on floor requiring potassium supplementation (now normokalemic)   - Renal US 1/16: No hydronephrosis. Increased echogenicity of both kidneys consistent with medical renal disease.  - Per Renal, avoid nephrotoxic agents including NSAIDs, RCA, ACE/ARB; hold oral hypoglycemics including Metformin in setting of ELADIA  - Trend labs per CCU protocol  - Nephrology following, further recs appreciated     #ID:  [Fever]  - Febrile, hypotensive  - WBC elevated to 17  - F/u repeat labs, Bcx/UA  - Tylenol for fever q6hrs PRN  - Vanc/Zosyn empirically due to concern for sepsis     #Heme:  [Supra-therapeutic INR]  - Epistaxis on initial hospital presentation, now resolved  - S/p Vitamin K supplementation x2   - Continue to hold home coumadin  - No active bleeding noted   - Trend labs per CCU protocol    #Endocrine:  [Type 2 DM]  - No prior long term use of insulin   - Lantus 9u bedtime, ISS  - Check FS pre-meals, bedtime     #Scrotal edema:  - Likely component of patient's anasarca>>US with hydrocele  - Showed clinical improvement while on floor   - Continue to elevate and monitor    #Wound Care  -  -  - Consult Wound Care, recs appreciated                   Matheus Agudelo, PGY2 Emergency Medicine CCU Accept Note    Transfer from: (  ) Medicine    (  ) Telemetry    (  ) RCU                                        (  ) Palliative     (  ) Stroke Unit    (  ) _______________    Accepting Physican:    HOSPITAL COURSE:     HPI:  79 y/o M patient with a history of HTN, CHF, CAD s/p PCI, AFib on Coumadin, DM and severe symptomatic Aortic Stenosis, reported unspecified hemoccult positive stools in the office, type 2 DM on metformin, with the patient self referring to [facility] directed by his office physician following an elevated INR and episode of epistaxis this AM following apparently 3 months of progressive dyspnoea and B/L LE oedema with poor aerobic functional status with dyspnoea with changing his clothes or ambulation to the BR.   Patient denies chest pain/pressure.  NO dyspnoea at present.  NO headache, no focal weakness.   No abdominal pain, no red blood per rectum or melena.  No back pain, no tearing back pain.   NO dysuria, no haematuria.   Denies weight loss or anorexia.  No rash.   Notes 3-4 months of persistent scrotal oedema.  No penile pain, no scrotal pain.  No leg pain.  Remaining review of systems not contributory.    The patient was admitted to the hospital for acute decompensated heart failure and begun on lasix diuresis. Complicated by acute hypoxic respiratory failure.  Required HiFlo / NC for oxygenation on floor, no hx of home O2. Patient underwent significant diuresis on flood with improvement on edema. Patient was followed by nephrology due to ELADIA. Patient had improvement in overall clinical status - improved breathing, edema. Transitioned off lasix drip to lasix TID. ELADIA slowly improved. Eval'd by Str Heart for AVR, underwent carotid duplex and CT heart. On 1/25, episode of HYPOtension to 78/50, patient endorsed dizziness. Suspected in context of over-diuresis, was given gentle hydration, patient improved.     Morning of 1/26/19: RRT on floor, patient noted to be hypotensive, unarousable to sternal rub, febrile. Begun on NC due to SpO2 of 91% on RA,  levo started. EKG without acute ischemic changes. Transferred to CCU for further care.       REVIEW OF SYSTEMS:   *ABLE TO ASSESS DUE TO PATIENT ALTERED MENTAL STATUS*    MEDICATIONS  (STANDING):  ALBUTerol/ipratropium for Nebulization 3 milliLiter(s) Nebulizer every 6 hours  amLODIPine   Tablet 5 milliGRAM(s) Oral daily  atorvastatin 40 milliGRAM(s) Oral at bedtime  clopidogrel Tablet 75 milliGRAM(s) Oral daily  dextrose 5%. 1000 milliLiter(s) (50 mL/Hr) IV Continuous <Continuous>  dextrose 50% Injectable 12.5 Gram(s) IV Push once  dextrose 50% Injectable 25 Gram(s) IV Push once  dextrose 50% Injectable 25 Gram(s) IV Push once  DOBUTamine Infusion 2 MICROgram(s)/kG/Min (5.784 mL/Hr) IV Continuous <Continuous>  finasteride 5 milliGRAM(s) Oral daily  furosemide   Injectable 40 milliGRAM(s) IV Push every 12 hours  insulin glargine Injectable (LANTUS) 9 Unit(s) SubCutaneous at bedtime  insulin lispro (HumaLOG) corrective regimen sliding scale   SubCutaneous three times a day before meals  insulin lispro (HumaLOG) corrective regimen sliding scale   SubCutaneous at bedtime  lactated ringers. 500 milliLiter(s) (500 mL/Hr) IV Continuous <Continuous>  nystatin Powder 1 Application(s) Topical two times a day    MEDICATIONS  (PRN):  acetaminophen   Tablet .. 650 milliGRAM(s) Oral every 6 hours PRN Moderate Pain (4 - 6)  dextrose 40% Gel 15 Gram(s) Oral once PRN Blood Glucose LESS THAN 70 milliGRAM(s)/deciliter  glucagon  Injectable 1 milliGRAM(s) IntraMuscular once PRN Glucose LESS THAN 70 milligrams/deciliter      Allergies  latex (Unknown)  Sulfacetamide Sodium (Unknown)      Vital Signs Last 24 Hrs  T(C): 36.7 (26 Jan 2019 04:10), Max: 37.1 (25 Jan 2019 21:26)  T(F): 98.1 (26 Jan 2019 04:10), Max: 98.8 (25 Jan 2019 21:26)  HR: 76 (26 Jan 2019 04:10) (72 - 80)  BP: 105/71 (26 Jan 2019 04:10) (103/66 - 105/71)  BP(mean): --  RR: 18 (26 Jan 2019 04:10) (17 - 18)  SpO2: 96% (26 Jan 2019 04:10) (96% - 99%)    I&O's Summary  25 Jan 2019 07:01  -  26 Jan 2019 07:00  --------------------------------------------------------  IN: 360 mL / OUT: 700 mL / NET: -340 mL      Physical Exam:  General: Awake but not alert or oriented. Febrile. Ill appearing.   Neck: Supple, no JVD, thyroid without masses or enlargement  Eyes: PERRL, no conjunctival injection   Chest/Lungs: Mild crackles at bases  Heart: RRR, S1, S2, systolic murmur noted   Abdomen: Soft, ND, NTTP, normoactive bowel sounds  Extremities: No clubbing; no joint deformity. +1 pitting edema to BL lower ext. Upper extremities noted to be edematous.   Skin: Left lower ext ulcer noted to medial ankle. Right lower ext noted to have large are of superficial skin erosion to posterior calf  Neurological: Awake but NOT alert or oriented, not following commands, not moving extremities spontaneously       LABS:     01-26    140  |  108  |  95<H>  ----------------------------<  107<H>  4.0   |  18<L>  |  2.63<H>    Ca    9.1      26 Jan 2019 06:05  Phos  3.9     01-25  Mg     2.3     01-25    TPro  5.3<L>  /  Alb  1.6<L>  /  TBili  3.2<H>  /  DBili  x   /  AST  88<H>  /  ALT  40  /  AlkPhos  139<H>  01-26    LIVER FUNCTIONS - ( 26 Jan 2019 06:05 )  Alb: 1.6 g/dL / Pro: 5.3 g/dL / ALK PHOS: 139 U/L / ALT: 40 U/L / AST: 88 U/L / GGT: x         PT/INR - ( 25 Jan 2019 08:44 )   PT: 93.3 sec;   INR: 7.68 ratio    -------------------------------------------------------------------     Echocardiogram:  TTE 1/16/19  Conclusions:  1. Mitral annular calcification. Thickened and tethered mitral valve leaflets. Moderate mitral regurgitation.  2. Calcified aortic valve with decreased opening. Morphology is not well seen. Cannot rule out bicuspid valve. Peak transaortic valve gradient equals 71 mm Hg, mean transaortic valve gradient equals 38 mm Hg, estimated aortic valve area equals 0.8 sqcm (by continuity equation),aortic valve velocity time integral equals 99 cm, consistent with severe aortic stenosis. Mild aortic regurgitation.  3. Severely dilated left atrium.  LA volume index = 57 cc/m2.  4. Eccentric left ventricular hypertrophy (dilated left ventricle with normal relative wall thickness).  5. Mild global left ventricular systolic dysfunction. Flattening of the interventricular septum in both systole and diastole is  consistent with right ventricular pressure overload.  6. Right ventricular enlargement with decreased right ventricular systolic function.  7. Normal tricuspid valve. Moderate-severe tricuspid regurgitation.  8. Estimated pulmonary artery systolic pressure equals 71 mm Hg, assuming right atrial pressure equals 10 mm Hg, consistent with severe pulmonary pressures.  *** No previous Echo exam.  -------------------------------------------------------------------    IMAGING:   CXR 1/26/19  IMPRESSION:   Pulmonary edema not significantly changed from prior.  Bilateral pleural effusions also unchanged.    CT Chest 1/16/19  IMPRESSION:   1.  Large right and small left pleural effusions measuring simple fluid. Mild interlobular septal thickening representing mild pulmonary edema.  2.  Coronary atherosclerosis.  3.  Aortic valve leaflet calcification.  4.  Maple artery is dilated and may represent pulmonary hypertension.  5.  Cirrhotic appearance of the liver. Ascites. Anasarca.    US Scrotum/Testicles 1/16/19  IMPRESSION:   Significant scrotal edema with moderate right and small left hydrocele. No focal collection. Left varicocele is present.    US Kidney/Bladder 1/16/19  IMPRESSION:   No hydronephrosis. Increased echogenicity of both kidneys consistent with medical renal disease. Trace ascites. Left pleural effusion.    CXR 1/17/19  IMPRESSION:   Increase in pulmonary edema with bilateral pleural effusions.    Duplex Carotids 1/24/19  IMPRESSION:   No hemodynamically significant stenosis within the bilateral carotid arteries.    CT Heart 1/24/19  IMPRESSION:   1.  TAVR measurements as above.  2.  Moderate bilateral pleural effusions.  3.  High density foci in the pelvis may be related to the prostate or bladder stones.  4.  Foci of air in the bladder, correlate for recent procedure.    US Abd 1/25/19  IMPRESSION:   Cyst in the pancreatic head with mild prominence of the pancreatic duct.  Layering sludge in the gallbladder. Bilateral pleural effusions and small volume  ----------------------------------------------------------------------    ASSESSMENT & PLAN:   78M with PMH of CAD s/p DAYANA to mLAD and POBA to D2 5/2017 with Dr. Harris, AFib on warfarin, T2DM, HLD, HTN who presented from home with worsening PEREZ and LE edema x3 months. TTE revealed EF 45%, severe AS, mild global LV dysfunction and severe pHTN. Now with worsening hypotension, AMS, possible sepsis.     #Neuro:  - Reportedly A+O x4 on initial hospital presentation  - Currently altered, decreased mental status. Awake but NOR alert or oriented at this time   - Possibly in context of sepsis VERSUS Hypoperfusion due to decreased circulating volume secondary to over-diuresis VERSUS other unknown etiology at this time  - Close neuro checks q1hr     #CV:  [Acute on Chronic Systolic CHF]  - Worsening PEREZ, lower extremity edema x3 months on hospital presentation  - TTE 1/16/19: Eccentric LV hypertrophy (dilated LV with normal relative wall thickness), dilated LA, mild global LV systolic dysfunction, RV enlargement with decreased RV systolic function.  - Being managed with lasix drip -> lasix TID on floor for diuresis, with signs of clinical improvement   - Hold further diuresis in context of ongoing hypotension, hypotension may be in context of over-diuresis   - Daily weights  - Strict I/Os    [CAD]  - s/p DAYANA to mLAD and POBA to D2 5/2017  - Continue plavix, atorvostatin (no ASA as per cardiology)  - Will require repeat right/left heart cath (pending clinical improvement) as per [facility] (see below)    [Aortic Stenosis]  - TTE 1/16/19 with severe aortic stenosis  - May be etiology of worsening heart failure / PEREZ   - Undergoing workup for TAVR   - CT heart and carotid dopplers performed (results as above)  - Per Structural Heart, will require right/left heart cath to complete TAVR evaluation (pending clinical improvement)  - Structural Heart following, further recs appreciated     [Chronic Afib]  - CHADS2-VASc - 6  - On home coumadin  - Supratherapeutic INR on admission and ongoing, hold further AC at this time   - S/p Vit K supplementation x 2  - Continue to HOLD home coreg due to periods of bradycardia on floor  - Labs daily to trend INR    [Polymorphic Vtach]  - Episodes of polymorphic Vtach on floors (always <10 beats)  - S/p IV magnesium x1   - Telemetry monitoring at bedside     [HYPOtension]  - Prior hx of HTN on Norvasc   - Currently on Levo @0.2, titrate to MAP >65  - Possibly in context of sepsis   - May require central line access if requires long-term pressor support   - HOLD further lasix, norvasc at this time   - Administer 250cc fluid bolus x1 (concern for distributive shock in context of possible sepsis - administer fluids cautiously given hx of severe AS)  - Close hemodynamic monitoring    #Pulm:  - Noted on admisison to have acute respiratory failure with hypoxia, requiring HiFlo / NC on floors  - Now requiring non-rebreather in CCU for respiratory support  - CXR 1/26 with ongoing pulmonary edema, bilateral pleural effusions   - CT Chest 1/16 with large right and small left pleural effusions, mild pulmonary edema  - Repeat CXR as clinically needed     #GI:  [GI bleed]  - Cirrhosis, ascites on CT; likely in context of right sided heart failure  - Positive fecal occult blood; possibly in context of supratherapeutic INR  - Trend H/H  - NPO until mental status improves  - May require KO tube placement     #Renal / Electrolytes:  [ELADIA]  - Suspected cardiorenal in nature   - S/p Lasix drip on floor  - Creatinine overall trending down since admission  - HYPOkalemic on floor requiring potassium supplementation (now normokalemic)   - Renal US 1/16: No hydronephrosis. Increased echogenicity of both kidneys consistent with medical renal disease.  - Per Renal, avoid nephrotoxic agents including NSAIDs, RCA, ACE/ARB; hold oral hypoglycemics including Metformin in setting of ELADIA  - Trend labs per CCU protocol  - Nephrology following, further recs appreciated     #ID:  [Fever]  - Febrile, hypotensive on CCU presentation  - WBC elevated to 17  - F/u repeat labs, Bcx/UA  - Tylenol for fever q6hrs PRN  - Vanc/Zosyn empirically due to concern for sepsis     #Heme:  [Supra-therapeutic INR]  - Epistaxis on initial hospital presentation, now resolved  - S/p Vitamin K supplementation x2   - Continue to hold home coumadin  - No active bleeding noted   - Trend labs per CCU protocol    #Endocrine:  [Type 2 DM]  - No prior long term use of insulin   - Lantus 9u bedtime, ISS  - Check FS pre-meals, bedtime     #Wound Care  - Lower extremities with skin ulcer/breakdown  - Consult Wound Care, recs appreciated                   Matheus Agudelo, PGY2 Emergency Medicine CCU Accept Note    Transfer from: (  ) Medicine    (  ) Telemetry    (  ) RCU                                        (  ) Palliative     (  ) Stroke Unit    (  ) _______________    Accepting Physican:    HOSPITAL COURSE:     HPI:  77 y/o M patient with a history of HTN, CHF, CAD s/p PCI, AFib on Coumadin, DM and severe symptomatic Aortic Stenosis, reported unspecified hemoccult positive stools in the office, type 2 DM on metformin, with the patient self referring to [place] directed by his office physician following an elevated INR and episode of epistaxis this AM following apparently 3 months of progressive dyspnoea and B/L LE oedema with poor aerobic functional status with dyspnoea with changing his clothes or ambulation to the BR.   Patient denies chest pain/pressure.  NO dyspnoea at present.  NO headache, no focal weakness.   No abdominal pain, no red blood per rectum or melena.  No back pain, no tearing back pain.   NO dysuria, no haematuria.   Denies weight loss or anorexia.  No rash.   Notes 3-4 months of persistent scrotal oedema.  No penile pain, no scrotal pain.  No leg pain.  Remaining review of systems not contributory.    The patient was admitted to the hospital for acute decompensated heart failure and begun on lasix diuresis. Complicated by acute hypoxic respiratory failure.  Required HiFlo / NC for oxygenation on floor, no hx of home O2. Patient underwent significant diuresis on flood with improvement on edema. Patient was followed by nephrology due to ELADIA. Patient had improvement in overall clinical status - improved breathing, edema. Transitioned off lasix drip to lasix TID. ELADIA slowly improved. Eval'd by Str Heart for AVR, underwent carotid duplex and CT heart. On 1/25, episode of HYPOtension to 78/50, patient endorsed dizziness. Suspected in context of over-diuresis, was given gentle hydration, patient improved.     Morning of 1/26/19: RRT on floor, patient noted to be hypotensive, unarousable to sternal rub, febrile. Begun on NC due to SpO2 of 91% on RA,  levo started. EKG without acute ischemic changes. Transferred to CCU for further care.       REVIEW OF SYSTEMS:   *ABLE TO ASSESS DUE TO PATIENT ALTERED MENTAL STATUS*    MEDICATIONS  (STANDING):  ALBUTerol/ipratropium for Nebulization 3 milliLiter(s) Nebulizer every 6 hours  amLODIPine   Tablet 5 milliGRAM(s) Oral daily  atorvastatin 40 milliGRAM(s) Oral at bedtime  clopidogrel Tablet 75 milliGRAM(s) Oral daily  dextrose 5%. 1000 milliLiter(s) (50 mL/Hr) IV Continuous <Continuous>  dextrose 50% Injectable 12.5 Gram(s) IV Push once  dextrose 50% Injectable 25 Gram(s) IV Push once  dextrose 50% Injectable 25 Gram(s) IV Push once  DOBUTamine Infusion 2 MICROgram(s)/kG/Min (5.784 mL/Hr) IV Continuous <Continuous>  finasteride 5 milliGRAM(s) Oral daily  furosemide   Injectable 40 milliGRAM(s) IV Push every 12 hours  insulin glargine Injectable (LANTUS) 9 Unit(s) SubCutaneous at bedtime  insulin lispro (HumaLOG) corrective regimen sliding scale   SubCutaneous three times a day before meals  insulin lispro (HumaLOG) corrective regimen sliding scale   SubCutaneous at bedtime  lactated ringers. 500 milliLiter(s) (500 mL/Hr) IV Continuous <Continuous>  nystatin Powder 1 Application(s) Topical two times a day    MEDICATIONS  (PRN):  acetaminophen   Tablet .. 650 milliGRAM(s) Oral every 6 hours PRN Moderate Pain (4 - 6)  dextrose 40% Gel 15 Gram(s) Oral once PRN Blood Glucose LESS THAN 70 milliGRAM(s)/deciliter  glucagon  Injectable 1 milliGRAM(s) IntraMuscular once PRN Glucose LESS THAN 70 milligrams/deciliter      Allergies  latex (Unknown)  Sulfacetamide Sodium (Unknown)      Vital Signs Last 24 Hrs  T(C): 36.7 (26 Jan 2019 04:10), Max: 37.1 (25 Jan 2019 21:26)  T(F): 98.1 (26 Jan 2019 04:10), Max: 98.8 (25 Jan 2019 21:26)  HR: 76 (26 Jan 2019 04:10) (72 - 80)  BP: 105/71 (26 Jan 2019 04:10) (103/66 - 105/71)  BP(mean): --  RR: 18 (26 Jan 2019 04:10) (17 - 18)  SpO2: 96% (26 Jan 2019 04:10) (96% - 99%)    I&O's Summary  25 Jan 2019 07:01  -  26 Jan 2019 07:00  --------------------------------------------------------  IN: 360 mL / OUT: 700 mL / NET: -340 mL      Physical Exam:  General: Awake but not alert or oriented. Febrile. Ill appearing.   Neck: Supple, no JVD, thyroid without masses or enlargement  Eyes: PERRL, no conjunctival injection   Chest/Lungs: Mild crackles at bases  Heart: RRR, S1, S2, systolic murmur noted   Abdomen: Soft, ND, NTTP, normoactive bowel sounds  Extremities: No clubbing; no joint deformity. +1 pitting edema to BL lower ext. Upper extremities noted to be edematous.   Skin: Left lower ext ulcer noted to medial ankle. Right lower ext noted to have large are of superficial skin erosion to posterior calf  Neurological: Awake but NOT alert or oriented, not following commands, not moving extremities spontaneously       LABS:     01-26    140  |  108  |  95<H>  ----------------------------<  107<H>  4.0   |  18<L>  |  2.63<H>    Ca    9.1      26 Jan 2019 06:05  Phos  3.9     01-25  Mg     2.3     01-25    TPro  5.3<L>  /  Alb  1.6<L>  /  TBili  3.2<H>  /  DBili  x   /  AST  88<H>  /  ALT  40  /  AlkPhos  139<H>  01-26    LIVER FUNCTIONS - ( 26 Jan 2019 06:05 )  Alb: 1.6 g/dL / Pro: 5.3 g/dL / ALK PHOS: 139 U/L / ALT: 40 U/L / AST: 88 U/L / GGT: x         PT/INR - ( 25 Jan 2019 08:44 )   PT: 93.3 sec;   INR: 7.68 ratio    -------------------------------------------------------------------     Echocardiogram:  TTE 1/16/19  Conclusions:  1. Mitral annular calcification. Thickened and tethered mitral valve leaflets. Moderate mitral regurgitation.  2. Calcified aortic valve with decreased opening. Morphology is not well seen. Cannot rule out bicuspid valve. Peak transaortic valve gradient equals 71 mm Hg, mean transaortic valve gradient equals 38 mm Hg, estimated aortic valve area equals 0.8 sqcm (by continuity equation),aortic valve velocity time integral equals 99 cm, consistent with severe aortic stenosis. Mild aortic regurgitation.  3. Severely dilated left atrium.  LA volume index = 57 cc/m2.  4. Eccentric left ventricular hypertrophy (dilated left ventricle with normal relative wall thickness).  5. Mild global left ventricular systolic dysfunction. Flattening of the interventricular septum in both systole and diastole is  consistent with right ventricular pressure overload.  6. Right ventricular enlargement with decreased right ventricular systolic function.  7. Normal tricuspid valve. Moderate-severe tricuspid regurgitation.  8. Estimated pulmonary artery systolic pressure equals 71 mm Hg, assuming right atrial pressure equals 10 mm Hg, consistent with severe pulmonary pressures.  *** No previous Echo exam.  -------------------------------------------------------------------    IMAGING:   CXR 1/26/19  IMPRESSION:   Pulmonary edema not significantly changed from prior.  Bilateral pleural effusions also unchanged.    CT Chest 1/16/19  IMPRESSION:   1.  Large right and small left pleural effusions measuring simple fluid. Mild interlobular septal thickening representing mild pulmonary edema.  2.  Coronary atherosclerosis.  3.  Aortic valve leaflet calcification.  4.  Maple artery is dilated and may represent pulmonary hypertension.  5.  Cirrhotic appearance of the liver. Ascites. Anasarca.    US Scrotum/Testicles 1/16/19  IMPRESSION:   Significant scrotal edema with moderate right and small left hydrocele. No focal collection. Left varicocele is present.    US Kidney/Bladder 1/16/19  IMPRESSION:   No hydronephrosis. Increased echogenicity of both kidneys consistent with medical renal disease. Trace ascites. Left pleural effusion.    CXR 1/17/19  IMPRESSION:   Increase in pulmonary edema with bilateral pleural effusions.    Duplex Carotids 1/24/19  IMPRESSION:   No hemodynamically significant stenosis within the bilateral carotid arteries.    CT Heart 1/24/19  IMPRESSION:   1.  TAVR measurements as above.  2.  Moderate bilateral pleural effusions.  3.  High density foci in the pelvis may be related to the prostate or bladder stones.  4.  Foci of air in the bladder, correlate for recent procedure.    US Abd 1/25/19  IMPRESSION:   Cyst in the pancreatic head with mild prominence of the pancreatic duct.  Layering sludge in the gallbladder. Bilateral pleural effusions and small volume  ----------------------------------------------------------------------    ASSESSMENT & PLAN:   78M with PMH of CHF, CAD s/p DAYANA to mLAD and POBA to D2 5/2017, AFib on home warfarin, T2DM, HLD, HTN who presented from home with worsening PEREZ and LE edema x3 months. TTE revealed EF 45%, severe AS, mild global LV dysfunction and severe pHTN. Undergoing workup and optimization for TAVR. Now with episode of worsening hypotension, AMS, fever; possibly in context of sepsis vs over diuresis.     #Neuro:  - Reportedly A+O x4 on initial hospital presentation  - Currently altered, decreased mental status. Awake but NOR alert or oriented at this time   - Possibly in context of sepsis VERSUS Hypoperfusion due to decreased circulating volume secondary to over-diuresis VERSUS other unknown etiology at this time  - Close neuro checks q1hr     #CV:  [Acute on Chronic Systolic CHF]  - Worsening PEREZ, lower extremity edema x3 months on hospital presentation  - TTE 1/16/19: Eccentric LV hypertrophy (dilated LV with normal relative wall thickness), dilated LA, mild global LV systolic dysfunction, RV enlargement with decreased RV systolic function.  - Being managed with lasix drip -> lasix TID on floor for diuresis, with signs of clinical improvement   - Hold further diuresis in context of ongoing hypotension, hypotension may be in context of over-diuresis   - Daily weights  - Strict I/Os    [CAD]  - s/p DAYANA to mLAD and POBA to D2 5/2017  - Continue plavix, atorvostatin (no ASA as per cardiology)  - Will require repeat right/left heart cath (pending clinical improvement) as per [facility] (see below)    [Aortic Stenosis]  - TTE 1/16/19 with severe aortic stenosis  - May be etiology of worsening heart failure / PEREZ   - Undergoing workup for TAVR   - CT heart and carotid dopplers performed (results as above)  - Per Structural [facility], will require right/left heart cath to complete TAVR evaluation (pending clinical improvement)  - Structural Heart following, further recs appreciated     [Chronic Afib]  - CHADS2-VASc - 6  - On home coumadin  - Supratherapeutic INR on admission and ongoing, hold further AC at this time   - S/p Vit K supplementation x 2  - Continue to HOLD home coreg due to periods of bradycardia on floor  - Labs daily to trend INR    [Polymorphic Vtach]  - Episodes of polymorphic Vtach on floors (always <10 beats)  - S/p IV magnesium x1   - Telemetry monitoring at bedside     [HYPOtension]  - Prior hx of HTN on Norvasc   - Currently on Levo @0.2, titrate to MAP >65  - Possibly in context of sepsis   - May require central line access if requires long-term pressor support   - HOLD further lasix, norvasc at this time   - Administer 250cc fluid bolus x1 (concern for distributive shock in context of possible sepsis - administer fluids cautiously given hx of severe AS)  - Close hemodynamic monitoring    #Pulm:  - Noted on admisison to have acute respiratory failure with hypoxia, requiring HiFlo / NC on floors  - Now requiring non-rebreather in CCU for respiratory support  - CXR 1/26 with ongoing pulmonary edema, bilateral pleural effusions   - CT Chest 1/16 with large right and small left pleural effusions, mild pulmonary edema  - Repeat CXR as clinically needed     #GI:  [GI bleed]  - Cirrhosis, ascites on CT; likely in context of right sided heart failure  - Positive fecal occult blood; possibly in context of supratherapeutic INR  - Trend H/H  - NPO until mental status improves  - May require KO tube placement     #Renal / Electrolytes:  [ELADIA]  - Suspected cardiorenal in nature   - S/p Lasix drip on floor  - Creatinine overall trending down since admission  - HYPOkalemic on floor requiring potassium supplementation (now normokalemic)   - Renal US 1/16: No hydronephrosis. Increased echogenicity of both kidneys consistent with medical renal disease.  - Per Renal, avoid nephrotoxic agents including NSAIDs, RCA, ACE/ARB; hold oral hypoglycemics including Metformin in setting of ELADIA  - Trend labs per CCU protocol  - Nephrology following, further recs appreciated     #ID:  [Fever]  - Febrile, hypotensive on CCU presentation  - WBC elevated to 17  - F/u repeat labs, Bcx/UA  - Tylenol for fever q6hrs PRN  - Vanc/Zosyn empirically due to concern for sepsis     #Heme:  [Supra-therapeutic INR]  - Epistaxis on initial hospital presentation, now resolved  - S/p Vitamin K supplementation x2   - Continue to hold home coumadin  - No active bleeding noted   - Trend labs per CCU protocol    #Endocrine:  [Type 2 DM]  - No prior long term use of insulin   - Lantus 9u bedtime, ISS  - Check FS pre-meals, bedtime     #Wound Care  - Lower extremities with skin ulcer/breakdown  - Consult Wound Care, recs appreciated                   Matheus Agudelo, PGY2 Emergency Medicine CCU Accept Note    Transfer from: (  ) Medicine    (  ) Telemetry    (  ) RCU                                        (  ) Palliative     (  ) Stroke Unit    (  ) _______________    Accepting Physican:    HOSPITAL COURSE:     HPI:  79 y/o M patient with a history of HTN, CHF, CAD s/p PCI, AFib on Coumadin, DM and severe symptomatic Aortic Stenosis, reported unspecified hemoccult positive stools in the office, type 2 DM on metformin, with the patient self referring to [place] directed by his office physician following an elevated INR and episode of epistaxis this AM following apparently 3 months of progressive dyspnoea and B/L LE oedema with poor aerobic functional status with dyspnoea with changing his clothes or ambulation to the BR.   Patient denies chest pain/pressure.  NO dyspnoea at present.  NO headache, no focal weakness.   No abdominal pain, no red blood per rectum or melena.  No back pain, no tearing back pain.   NO dysuria, no haematuria.   Denies weight loss or anorexia.  No rash.   Notes 3-4 months of persistent scrotal oedema.  No penile pain, no scrotal pain.  No leg pain.  Remaining review of systems not contributory.    The patient was admitted to the hospital for acute decompensated heart failure and begun on lasix diuresis. Complicated by acute hypoxic respiratory failure.  Required HiFlo / NC for oxygenation on floor, no hx of home O2. Patient underwent significant diuresis on flood with improvement on edema. Patient was followed by nephrology due to ELADIA. Patient had improvement in overall clinical status - improved breathing, edema. Transitioned off lasix drip to lasix TID. ELADIA slowly improved. Eval'd by Str Heart for AVR, underwent carotid duplex and CT heart. On 1/25, episode of HYPOtension to 78/50, patient endorsed dizziness. Suspected in context of over-diuresis, was given gentle hydration, patient improved.     Morning of 1/26/19: RRT on floor, patient noted to be hypotensive, unarousable to sternal rub, febrile. Begun on NC due to SpO2 of 91% on RA,  levo started. EKG without acute ischemic changes. Transferred to CCU for further care.       REVIEW OF SYSTEMS:   *ABLE TO ASSESS DUE TO PATIENT ALTERED MENTAL STATUS*    MEDICATIONS  (STANDING):  ALBUTerol/ipratropium for Nebulization 3 milliLiter(s) Nebulizer every 6 hours  amLODIPine   Tablet 5 milliGRAM(s) Oral daily  atorvastatin 40 milliGRAM(s) Oral at bedtime  clopidogrel Tablet 75 milliGRAM(s) Oral daily  dextrose 5%. 1000 milliLiter(s) (50 mL/Hr) IV Continuous <Continuous>  dextrose 50% Injectable 12.5 Gram(s) IV Push once  dextrose 50% Injectable 25 Gram(s) IV Push once  dextrose 50% Injectable 25 Gram(s) IV Push once  DOBUTamine Infusion 2 MICROgram(s)/kG/Min (5.784 mL/Hr) IV Continuous <Continuous>  finasteride 5 milliGRAM(s) Oral daily  furosemide   Injectable 40 milliGRAM(s) IV Push every 12 hours  insulin glargine Injectable (LANTUS) 9 Unit(s) SubCutaneous at bedtime  insulin lispro (HumaLOG) corrective regimen sliding scale   SubCutaneous three times a day before meals  insulin lispro (HumaLOG) corrective regimen sliding scale   SubCutaneous at bedtime  lactated ringers. 500 milliLiter(s) (500 mL/Hr) IV Continuous <Continuous>  nystatin Powder 1 Application(s) Topical two times a day    MEDICATIONS  (PRN):  acetaminophen   Tablet .. 650 milliGRAM(s) Oral every 6 hours PRN Moderate Pain (4 - 6)  dextrose 40% Gel 15 Gram(s) Oral once PRN Blood Glucose LESS THAN 70 milliGRAM(s)/deciliter  glucagon  Injectable 1 milliGRAM(s) IntraMuscular once PRN Glucose LESS THAN 70 milligrams/deciliter      Allergies  latex (Unknown)  Sulfacetamide Sodium (Unknown)      Vital Signs Last 24 Hrs  T(C): 36.7 (26 Jan 2019 04:10), Max: 37.1 (25 Jan 2019 21:26)  T(F): 98.1 (26 Jan 2019 04:10), Max: 98.8 (25 Jan 2019 21:26)  HR: 76 (26 Jan 2019 04:10) (72 - 80)  BP: 105/71 (26 Jan 2019 04:10) (103/66 - 105/71)  BP(mean): --  RR: 18 (26 Jan 2019 04:10) (17 - 18)  SpO2: 96% (26 Jan 2019 04:10) (96% - 99%)    I&O's Summary  25 Jan 2019 07:01  -  26 Jan 2019 07:00  --------------------------------------------------------  IN: 360 mL / OUT: 700 mL / NET: -340 mL      Physical Exam:  General: Awake but not alert or oriented. Febrile. Ill appearing.   Neck: Supple, no JVD, thyroid without masses or enlargement  Eyes: PERRL, no conjunctival injection   Chest/Lungs: Mild crackles at bases  Heart: RRR, S1, S2, systolic murmur noted   Abdomen: Soft, ND, NTTP, normoactive bowel sounds  Extremities: No clubbing; no joint deformity. +1 pitting edema to BL lower ext. Upper extremities noted to be edematous.   Skin: Left lower ext ulcer noted to medial ankle. Right lower ext noted to have large are of superficial skin erosion to posterior calf  Neurological: Awake but NOT alert or oriented, not following commands, not moving extremities spontaneously       LABS:     01-26    140  |  108  |  95<H>  ----------------------------<  107<H>  4.0   |  18<L>  |  2.63<H>    Ca    9.1      26 Jan 2019 06:05  Phos  3.9     01-25  Mg     2.3     01-25    TPro  5.3<L>  /  Alb  1.6<L>  /  TBili  3.2<H>  /  DBili  x   /  AST  88<H>  /  ALT  40  /  AlkPhos  139<H>  01-26    LIVER FUNCTIONS - ( 26 Jan 2019 06:05 )  Alb: 1.6 g/dL / Pro: 5.3 g/dL / ALK PHOS: 139 U/L / ALT: 40 U/L / AST: 88 U/L / GGT: x         PT/INR - ( 25 Jan 2019 08:44 )   PT: 93.3 sec;   INR: 7.68 ratio    -------------------------------------------------------------------     Echocardiogram:  TTE 1/16/19  Conclusions:  1. Mitral annular calcification. Thickened and tethered mitral valve leaflets. Moderate mitral regurgitation.  2. Calcified aortic valve with decreased opening. Morphology is not well seen. Cannot rule out bicuspid valve. Peak transaortic valve gradient equals 71 mm Hg, mean transaortic valve gradient equals 38 mm Hg, estimated aortic valve area equals 0.8 sqcm (by continuity equation),aortic valve velocity time integral equals 99 cm, consistent with severe aortic stenosis. Mild aortic regurgitation.  3. Severely dilated left atrium.  LA volume index = 57 cc/m2.  4. Eccentric left ventricular hypertrophy (dilated left ventricle with normal relative wall thickness).  5. Mild global left ventricular systolic dysfunction. Flattening of the interventricular septum in both systole and diastole is  consistent with right ventricular pressure overload.  6. Right ventricular enlargement with decreased right ventricular systolic function.  7. Normal tricuspid valve. Moderate-severe tricuspid regurgitation.  8. Estimated pulmonary artery systolic pressure equals 71 mm Hg, assuming right atrial pressure equals 10 mm Hg, consistent with severe pulmonary pressures.  *** No previous Echo exam.  -------------------------------------------------------------------    IMAGING:   CXR 1/26/19  IMPRESSION:   Pulmonary edema not significantly changed from prior.  Bilateral pleural effusions also unchanged.    CT Chest 1/16/19  IMPRESSION:   1.  Large right and small left pleural effusions measuring simple fluid. Mild interlobular septal thickening representing mild pulmonary edema.  2.  Coronary atherosclerosis.  3.  Aortic valve leaflet calcification.  4.  Maple artery is dilated and may represent pulmonary hypertension.  5.  Cirrhotic appearance of the liver. Ascites. Anasarca.    US Scrotum/Testicles 1/16/19  IMPRESSION:   Significant scrotal edema with moderate right and small left hydrocele. No focal collection. Left varicocele is present.    US Kidney/Bladder 1/16/19  IMPRESSION:   No hydronephrosis. Increased echogenicity of both kidneys consistent with medical renal disease. Trace ascites. Left pleural effusion.    CXR 1/17/19  IMPRESSION:   Increase in pulmonary edema with bilateral pleural effusions.    Duplex Carotids 1/24/19  IMPRESSION:   No hemodynamically significant stenosis within the bilateral carotid arteries.    CT Heart 1/24/19  IMPRESSION:   1.  TAVR measurements as above.  2.  Moderate bilateral pleural effusions.  3.  High density foci in the pelvis may be related to the prostate or bladder stones.  4.  Foci of air in the bladder, correlate for recent procedure.    US Abd 1/25/19  IMPRESSION:   Cyst in the pancreatic head with mild prominence of the pancreatic duct.  Layering sludge in the gallbladder. Bilateral pleural effusions and small volume  ----------------------------------------------------------------------    ASSESSMENT & PLAN:   78M with PMH of CHF, CAD s/p DAYANA to mLAD and POBA to D2 5/2017, AFib on home warfarin, T2DM, HLD, HTN who presented from home with worsening PEREZ and LE edema x3 months. TTE revealed EF 45%, severe AS, mild global LV dysfunction and severe pHTN. Undergoing workup and optimization for TAVR. Now with episode of worsening hypotension, AMS, fever; possibly in context of sepsis vs over diuresis.     #Neuro:  - Reportedly A+O x4 on initial hospital presentation  - Currently altered, decreased mental status. Awake but NOR alert or oriented at this time   - Possibly in context of sepsis VERSUS Hypoperfusion due to decreased circulating volume secondary to over-diuresis VERSUS other unknown etiology at this time  - Close neuro checks q1hr     #CV:  [Acute on Chronic Systolic CHF]  - Worsening PEREZ, lower extremity edema x3 months on hospital presentation  - TTE 1/16/19: Eccentric LV hypertrophy (dilated LV with normal relative wall thickness), dilated LA, mild global LV systolic dysfunction, RV enlargement with decreased RV systolic function.  - Being managed with lasix drip -> lasix TID on floor for diuresis, with signs of clinical improvement   - Hold further diuresis in context of ongoing hypotension, hypotension may be in context of over-diuresis   - Daily weights  - Strict I/Os    [CAD]  - s/p DAYANA to mLAD and POBA to D2 5/2017  - Continue plavix, atorvostatin (no ASA as per cardiology)  - Will require [facility] right[facility]left heart cath (pending clinical improvement[facility]    [Aortic Stenosis]  - TTE 1/16/19 with severe aortic stenosis  - May be etiology of worsening heart failure / PEREZ   - Undergoing workup for TAVR   - CT heart and carotid dopplers performed (results as above)  - Per Structural Heart, will require right/left heart cath to complete TAVR evaluation (pending clinical improvement)  - Structural Heart following, further recs appreciated     [Chronic Afib]  - CHADS2-VASc - 6  - On home coumadin  - Supratherapeutic INR on admission and ongoing, hold further AC at this time   - S/p Vit K supplementation x 2  - Continue to HOLD home coreg due to periods of bradycardia on floor  - Labs daily to trend INR    [Polymorphic Vtach]  - Episodes of polymorphic Vtach on floors (always <10 beats)  - S/p IV magnesium x1   - Telemetry monitoring at bedside     [HYPOtension]  - Prior hx of HTN on Norvasc   - Currently on Levo @0.2, titrate to MAP >65  - Possibly in context of sepsis   - May require central line access if requires long-term pressor support   - HOLD further lasix, norvasc at this time   - Administer 250cc fluid bolus x1 (concern for distributive shock in context of possible sepsis - administer fluids cautiously given hx of severe AS)  - Close hemodynamic monitoring    #Pulm:  - Noted on admisison to have acute respiratory failure with hypoxia, requiring HiFlo / NC on floors  - Now requiring non-rebreather in CCU for respiratory support  - CXR 1/26 with ongoing pulmonary edema, bilateral pleural effusions   - CT Chest 1/16 with large right and small left pleural effusions, mild pulmonary edema  - Repeat CXR as clinically needed     #GI:  [GI bleed]  - Cirrhosis, ascites on CT; likely in context of right sided heart failure  - Positive fecal occult blood; possibly in context of supratherapeutic INR  - Trend H/H  - NPO until mental status improves  - May require KO tube placement     #Renal / Electrolytes:  [ELADIA]  - Suspected cardiorenal in nature   - S/p Lasix drip on floor  - Creatinine overall trending down since admission  - HYPOkalemic on floor requiring potassium supplementation (now normokalemic)   - Renal US 1/16: No hydronephrosis. Increased echogenicity of both kidneys consistent with medical renal disease.  - Per Renal, avoid nephrotoxic agents including NSAIDs, RCA, ACE/ARB; hold oral hypoglycemics including Metformin in setting of ELADIA  - Trend labs per CCU protocol  - Nephrology following, further recs appreciated     #ID:  [Fever]  - Febrile, hypotensive on CCU presentation  - WBC elevated to 17  - F/u repeat labs, Bcx/UA  - Tylenol for fever q6hrs PRN  - Vanc/Zosyn empirically due to concern for sepsis     #Heme:  [Supra-therapeutic INR]  - Epistaxis on initial hospital presentation, now resolved  - S/p Vitamin K supplementation x2   - Continue to hold home coumadin  - No active bleeding noted   - Trend labs per CCU protocol    #Endocrine:  [Type 2 DM]  - No prior long term use of insulin   - Lantus 9u bedtime, ISS  - Check FS pre-meals, bedtime     #Wound Care  - Lower extremities with skin ulcer/breakdown  - Consult Wound Care, recs appreciated       Matheus Agudelo, PGY2 Emergency Medicine      Attending attestation:  Seen/examined at bedside. Agree with above. Tx for CCU this morning for hypotension  severe AS and acute on chronic systolic heart failure (likely underlying chronic ICM with CAD and prior PCI).  Unclear if this is primarily cardiogenic in origin; his creatinine has worsened and I think he is significantly preload dependent. His Temperature was over 102 this morning, suggestive of a more septic etiology.  Gentle IVF and hold diuresis  CXR, Cultures and broad spectrum abx  trend lactate and creatinine  coumadin on hold for supratherapeutic inr  Vasopressors to maintain MAP >60    High risk of decompensation. >35 min spent taking care of patient.

## 2019-01-26 NOTE — PROVIDER CONTACT NOTE (OTHER) - RECOMMENDATIONS
NP informed, need assessment
IVF
Monitor patient.
Notify PA
RRT.
continue to monitor.
notify NP, continue to monitor, ?labs
notify NP, continue to monitor, ?labs
oral

## 2019-01-26 NOTE — PROGRESS NOTE ADULT - PROBLEM SELECTOR PLAN 1
Patients renal injury was initially improving with diuresis and likely had nephrosarca. Now patient likely has multifactorial etiology occurring with septic/cardiogenic shock in setting of RV failure and severe to critical AS or likely a combination of both leading to renal hypoperfusion which is also leading to encephalopathy. Patient now on pressors which can help mediate some of the hypotension however this process likely is at least somewhat volume mediated as well. As such would hold off on diuresis at this point and try giving small boluses of fluid back to the patient 100-250 ccs at a time to assess if mentation or hypotension start to improve and maintain urine output. Would keep close eye on lung exam as patient already has moderate effusions bilaterally per CT scan done recently however would benefit likely from fluid at this point both for preload and for possible sepsis. Antibiotics also should be initiated and aspiration seems to be a likely source.

## 2019-01-26 NOTE — PROVIDER CONTACT NOTE (OTHER) - ASSESSMENT
Pt is alert and oriented x4, pt denies any chest pain, SOB, palpitation, dizziness, n/v now. Pt was resting comfortably during the event time. Afib 35-50 on tele. /69,P:42 and PO2 96% on 2L O2 NC
Pt AAOx4, NAD. C/o of dizziness.
Pt asymptomatic. Vitals taken. /67. HR 43.
Pt in bed with c/o SOB. VSS, see flowsheet. No c/o pain/discomfort.
Pt in bed with c/o SOB. VSS. No c/o pain/discomfort.
Pt in bed. VSS. Pt with minimal urine output post Iv lasix.
Pt is asymptomatic.
Pt. lethargic, BP 63/50. Sternal rub done patient dose not wake up.
pt stable, vitals stable (see flowsheet), no c/o sob or chest pain
pt stable, vitals stable, no c/o sob or chest pain

## 2019-01-26 NOTE — CHART NOTE - NSCHARTNOTEFT_GEN_A_CORE
RRT called for hypotension 60/30mmHg  78 yr-old male with HTN, CHF, CAD s/p PCI, AFib on Coumadin, DM and severe symptomatic Aortic Stenosis admitted from home by PMD with supratherapeutic INR with epistaxis, and acute on chronic CHF exacerbation class III. Undergoing evaluation for TAVR for severe symptomatic aortic stenosis. He was admitted for acute decompensated heart failure which has now improved with diuresis. RRT called for hypotension, upon arrival, patient unarousable to sternal rub, advanced to NRB as he was saturating 91% on NC. Exam notbale to LE edema and trace bibasilar crackles. He was placed in trendelenburg position without improvement in BP. EKG without acute ischemic changes. Prior TTE showing echo with EF 45 % with severe AS with right sided heart failure. Of note, INR was elevated. During RRT, dobutamine was ordered. Lasix was not ordered in the setting of hypotension and he will need TTE and rule out ACS at the least. CCU consult called during RRT and he was transferred there.     LEOLA Parisi

## 2019-01-26 NOTE — PROGRESS NOTE ADULT - PROBLEM SELECTOR PLAN 3
Likely secondary to CHF exacerbation and AS  - Patient with L and R sided heart failure with severe AS by valve size and dilated LA along with severe pHTN likely secondary to heart failure. Will likely require intervention for valve help overall functional status and for resolution of kidney function however currently will require management of shock as well at this point prior to any valvular intervention. Hold diuresis for now.

## 2019-01-26 NOTE — PROCEDURE NOTE - NSSITEPREP_SKIN_A_CORE
Adherence to aseptic technique: hand hygiene prior to donning barriers (gown, gloves), don cap and mask, sterile drape over patient/chlorhexidine
chlorhexidine
povidone iodine (if allergic to chlorhexidine)

## 2019-01-26 NOTE — PROVIDER CONTACT NOTE (OTHER) - ACTION/TREATMENT ORDERED:
NP informed, will continue to monitor
200 ml NS over 4 hours administered.
Administer scheduled dose of amlodipine, Mg and Phos labs ordered. continue to monitor pt.
As per PA, 1g mag IVPB ordered to be administered. Continue to monitor patient
As per PA, place pt on ventimask, labs and AGB ordered as well as Stat cxray. 1x dose 20mg Iv lasix ordered. Place pt on continuos O2 monitoring.
As per PA, straight cath x1. Continue to monitor patient.
NP aware, continue to monitor
NP aware, continue to monitor, will order labs (BMP, MAG, PHOS), plan to supplement as needed
NP aware. RRT team came. Pt. transferred to ccu bed 12. Report given. Pt. transferred.
continue to monitor. keep R2 pads at bedside

## 2019-01-26 NOTE — PROGRESS NOTE ADULT - PROBLEM SELECTOR PLAN 2
Continuing to improve slowly however will require close monitoring of blood gas in the setting of new shock

## 2019-01-26 NOTE — PROGRESS NOTE ADULT - PROBLEM SELECTOR PLAN 4
Patient now hypotensive in the setting of likely infectious process and heart failure/AS. Continue to monitor, agree with Levophed however would also start giving small volume boluses to see how pressure is affected keeping close eye on lung exam.

## 2019-01-26 NOTE — PROVIDER CONTACT NOTE (CHANGE IN STATUS NOTIFICATION) - ASSESSMENT
Pt became hypotensive and unresponsive. Pt suddenly bradycardic, then PEA arrest. Code blue initiated.

## 2019-01-26 NOTE — PROCEDURE NOTE - NSPROCDETAILS_GEN_ALL_CORE
location identified, draped/prepped, sterile technique used, needle inserted/introduced/connected to a pressurized flush line/all materials/supplies accounted for at end of procedure/positive blood return obtained via catheter/sutured in place/hemostasis with direct pressure, dressing applied/ultrasound guidance/Seldinger technique
lumen(s) aspirated and flushed/sterile dressing applied/ultrasound guidance/guidewire recovered/sterile technique, catheter placed
sterile technique, indwelling urinary device inserted/a urinary catheter insertion kit was used for all insertion materials

## 2019-01-26 NOTE — PROGRESS NOTE ADULT - SUBJECTIVE AND OBJECTIVE BOX
====================  CCU MIDNIGHT ROUNDS  ====================    JOSUÉ RAMIREZ  [id number]  Patient is a 78y old  Male who presents with a chief complaint of 3 months of progressive B/L LE oedema and dyspnoea on exertion. (26 Jan 2019 10:36)      ====================  SUMMARY:  ====================      ====================  NEW EVENTS:  ====================    MEDICATIONS  (STANDING):  ALBUTerol/ipratropium for Nebulization 3 milliLiter(s) Nebulizer every 6 hours  atorvastatin 40 milliGRAM(s) Oral at bedtime  chlorhexidine 4% Liquid 1 Application(s) Topical <User Schedule>  clopidogrel Tablet 75 milliGRAM(s) Oral daily  dextrose 5%. 1000 milliLiter(s) (50 mL/Hr) IV Continuous <Continuous>  dextrose 50% Injectable 12.5 Gram(s) IV Push once  dextrose 50% Injectable 25 Gram(s) IV Push once  dextrose 50% Injectable 25 Gram(s) IV Push once  finasteride 5 milliGRAM(s) Oral daily  insulin glargine Injectable (LANTUS) 9 Unit(s) SubCutaneous at bedtime  insulin lispro (HumaLOG) corrective regimen sliding scale   SubCutaneous three times a day before meals  insulin lispro (HumaLOG) corrective regimen sliding scale   SubCutaneous at bedtime  norepinephrine Infusion 0.2 MICROgram(s)/kG/Min (36.15 mL/Hr) IV Continuous <Continuous>  nystatin Powder 1 Application(s) Topical two times a day  piperacillin/tazobactam IVPB. 3.375 Gram(s) IV Intermittent every 12 hours  vancomycin  IVPB 1000 milliGRAM(s) IV Intermittent daily    MEDICATIONS  (PRN):  acetaminophen   Tablet .. 650 milliGRAM(s) Oral every 6 hours PRN Moderate Pain (4 - 6)  dextrose 40% Gel 15 Gram(s) Oral once PRN Blood Glucose LESS THAN 70 milliGRAM(s)/deciliter  glucagon  Injectable 1 milliGRAM(s) IntraMuscular once PRN Glucose LESS THAN 70 milligrams/deciliter      ====================  VITALS (Last 12 hrs):  ====================    T(C): 36.9 (01-26-19 @ 18:00), Max: 36.9 (01-26-19 @ 11:00)  T(F): 98.5 (01-26-19 @ 18:00), Max: 98.5 (01-26-19 @ 18:00)  HR: 78 (01-26-19 @ 21:30) (56 - 82)  BP: 90/59 (01-26-19 @ 21:30) (61/44 - 105/64)  BP(mean): 68 (01-26-19 @ 21:30) (49 - 78)  RR: 28 (01-26-19 @ 21:30) (18 - 28)  SpO2: 92% (01-26-19 @ 21:30) (91% - 99%)      I&O's Summary    25 Jan 2019 07:01  -  26 Jan 2019 07:00  --------------------------------------------------------  IN: 360 mL / OUT: 700 mL / NET: -340 mL    26 Jan 2019 07:01  -  26 Jan 2019 21:37  --------------------------------------------------------  IN: 1149.7 mL / OUT: 270 mL / NET: 879.7 mL      ====================  NEW LABS:  ====================      01-26    142  |  109<H>  |  98<H>  ----------------------------<  94  3.9   |  19<L>  |  2.75<H>    Ca    9.3      26 Jan 2019 08:34  Phos  4.7     01-26  Mg     2.2     01-26    TPro  5.6<L>  /  Alb  1.9<L>  /  TBili  3.6<H>  /  DBili  x   /  AST  84<H>  /  ALT  40  /  AlkPhos  145<H>  01-26    PT/INR - ( 26 Jan 2019 08:34 )   PT: 35.6 sec;   INR: 2.99 ratio         PTT - ( 26 Jan 2019 08:34 )  PTT:50.1 sec  Creatine Kinase, Serum: 99 U/L (01-26-19 @ 08:34)    CKMB Units: 2.4 ng/mL (01-26 @ 08:34)    ABG - ( 26 Jan 2019 08:29 )  pH, Arterial: 7.48  pH, Blood: x     /  pCO2: 28    /  pO2: 201   / HCO3: 20    / Base Excess: -2.3  /  SaO2: 99          ====================  PLAN:  ====================  -       Violette Servin CC[facility] NP  Beeper #[id number]  Spectra # [phone] ====================  CCU MIDNIGHT ROUNDS  ====================    JOSUÉ RAMIREZ  [id number]  Patient is a 78y old  Male who presents with a chief complaint of 3 months of progressive B/L LE oedema and dyspnoea on exertion. (26 Jan 2019 10:36)      ====================  SUMMARY: 79 y/o male with PMH of HTN, CHF, CAD   ====================      ====================  NEW EVENTS:  ====================    MEDICATIONS  (STANDING):  ALBUTerol/ipratropium for Nebulization 3 milliLiter(s) Nebulizer every 6 hours  atorvastatin 40 milliGRAM(s) Oral at bedtime  chlorhexidine 4% Liquid 1 Application(s) Topical <User Schedule>  clopidogrel Tablet 75 milliGRAM(s) Oral daily  dextrose 5%. 1000 milliLiter(s) (50 mL/Hr) IV Continuous <Continuous>  dextrose 50% Injectable 12.5 Gram(s) IV Push once  dextrose 50% Injectable 25 Gram(s) IV Push once  dextrose 50% Injectable 25 Gram(s) IV Push once  finasteride 5 milliGRAM(s) Oral daily  insulin glargine Injectable (LANTUS) 9 Unit(s) SubCutaneous at bedtime  insulin lispro (HumaLOG) corrective regimen sliding scale   SubCutaneous three times a day before meals  insulin lispro (HumaLOG) corrective regimen sliding scale   SubCutaneous at bedtime  norepinephrine Infusion 0.2 MICROgram(s)/kG/Min (36.15 mL/Hr) IV Continuous <Continuous>  nystatin Powder 1 Application(s) Topical two times a day  piperacillin/tazobactam IVPB. 3.375 Gram(s) IV Intermittent every 12 hours  vancomycin  IVPB 1000 milliGRAM(s) IV Intermittent daily    MEDICATIONS  (PRN):  acetaminophen   Tablet .. 650 milliGRAM(s) Oral every 6 hours PRN Moderate Pain (4 - 6)  dextrose 40% Gel 15 Gram(s) Oral once PRN Blood Glucose LESS THAN 70 milliGRAM(s)/deciliter  glucagon  Injectable 1 milliGRAM(s) IntraMuscular once PRN Glucose LESS THAN 70 milligrams/deciliter      ====================  VITALS (Last 12 hrs):  ====================    T(C): 36.9 (01-26-19 @ 18:00), Max: 36.9 (01-26-19 @ 11:00)  T(F): 98.5 (01-26-19 @ 18:00), Max: 98.5 (01-26-19 @ 18:00)  HR: 78 (01-26-19 @ 21:30) (56 - 82)  BP: 90/59 (01-26-19 @ 21:30) (61/44 - 105/64)  BP(mean): 68 (01-26-19 @ 21:30) (49 - 78)  RR: 28 (01-26-19 @ 21:30) (18 - 28)  SpO2: 92% (01-26-19 @ 21:30) (91% - 99%)      I&O's Summary    25 Jan 2019 07:01  -  26 Jan 2019 07:00  --------------------------------------------------------  IN: 360 mL / OUT: 700 mL / NET: -340 mL    26 Jan 2019 07:01  -  26 Jan 2019 21:37  --------------------------------------------------------  IN: 1149.7 mL / OUT: 270 mL / NET: 879.7 mL      ====================  NEW LABS:  ====================      01-26    142  |  109<H>  |  98<H>  ----------------------------<  94  3.9   |  19<L>  |  2.75<H>    Ca    9.3      26 Jan 2019 08:34  Phos  4.7     01-26  Mg     2.2     01-26    TPro  5.6<L>  /  Alb  1.9<L>  /  TBili  3.6<H>  /  DBili  x   /  AST  84<H>  /  ALT  40  /  AlkPhos  145<H>  01-26    PT/INR - ( 26 Jan 2019 08:34 )   PT: 35.6 sec;   INR: 2.99 ratio         PTT - ( 26 Jan 2019 08:34 )  PTT:50.1 sec  Creatine Kinase, Serum: 99 U/L (01-26-19 @ 08:34)    CKMB Units: 2.4 ng/mL (01-26 @ 08:34)    ABG - ( 26 Jan 2019 08:29 )  pH, Arterial: 7.48  pH, Blood: x     /  pCO2: 28    /  pO2: 201   / HCO3: 20    / Base Excess: -2.3  /  SaO2: 99          ====================  PLAN:  ====================  -       Violette Servin [facility] NP  Beeper #[id number]  Spectra # [phone] ====================  CCU MIDNIGHT ROUNDS  ====================    JOSUÉ RAMIREZ  [id number]  Patient is a 78y old  Male who presents with a chief complaint of 3 months of progressive B/L LE oedema and dyspnoea on exertion. (26 Jan 2019 10:36)      ====================  SUMMARY: 79 y/o male with PMH of HTN, CHF, CAD s/p PCI, AFib on coumadin, DM (on metformin) and severe AS presented to his PCP with progressive dyspnea and bilateral LE edema for 3 months. Pt was admitted for acute decompensated heart failure and treated with Lasix.  Hospital course complicated by acute hypoxic respiratory failure requiring hiFlo Pt responded to diuresis and was transitioned from gtt to lasix TID. Pt also being followed by structural heart for AVR.  On 1/26, RRT was called on the floor when patient was found to be hypotensive, unarousable to sternal rub and fever. Pt placed on NRB, started on levophed gtt transferred to CCU for further management.  ====================      ====================  NEW EVENTS: Pt had an episode of hypotension and became pulseless. CPR and code blue initiated with epi x 1 and atropine x1, ROSC after 3 minutes. Pt was intubated for airway protection.   ====================    MEDICATIONS  (STANDING):  ALBUTerol/ipratropium for Nebulization 3 milliLiter(s) Nebulizer every 6 hours  atorvastatin 40 milliGRAM(s) Oral at bedtime  chlorhexidine 4% Liquid 1 Application(s) Topical <User Schedule>  clopidogrel Tablet 75 milliGRAM(s) Oral daily  dextrose 5%. 1000 milliLiter(s) (50 mL/Hr) IV Continuous <Continuous>  dextrose 50% Injectable 12.5 Gram(s) IV Push once  dextrose 50% Injectable 25 Gram(s) IV Push once  dextrose 50% Injectable 25 Gram(s) IV Push once  finasteride 5 milliGRAM(s) Oral daily  insulin glargine Injectable (LANTUS) 9 Unit(s) SubCutaneous at bedtime  insulin lispro (HumaLOG) corrective regimen sliding scale   SubCutaneous three times a day before meals  insulin lispro (HumaLOG) corrective regimen sliding scale   SubCutaneous at bedtime  norepinephrine Infusion 0.2 MICROgram(s)/kG/Min (36.15 mL/Hr) IV Continuous <Continuous>  nystatin Powder 1 Application(s) Topical two times a day  piperacillin/tazobactam IVPB. 3.375 Gram(s) IV Intermittent every 12 hours  vancomycin  IVPB 1000 milliGRAM(s) IV Intermittent daily    MEDICATIONS  (PRN):  acetaminophen   Tablet .. 650 milliGRAM(s) Oral every 6 hours PRN Moderate Pain (4 - 6)  dextrose 40% Gel 15 Gram(s) Oral once PRN Blood Glucose LESS THAN 70 milliGRAM(s)/deciliter  glucagon  Injectable 1 milliGRAM(s) IntraMuscular once PRN Glucose LESS THAN 70 milligrams/deciliter      ====================  VITALS (Last 12 hrs):  ====================    T(C): 36.9 (01-26-19 @ 18:00), Max: 36.9 (01-26-19 @ 11:00)  T(F): 98.5 (01-26-19 @ 18:00), Max: 98.5 (01-26-19 @ 18:00)  HR: 78 (01-26-19 @ 21:30) (56 - 82)  BP: 90/59 (01-26-19 @ 21:30) (61/44 - 105/64)  BP(mean): 68 (01-26-19 @ 21:30) (49 - 78)  RR: 28 (01-26-19 @ 21:30) (18 - 28)  SpO2: 92% (01-26-19 @ 21:30) (91% - 99%)      I&O's Summary    25 Jan 2019 07:01  -  26 Jan 2019 07:00  --------------------------------------------------------  IN: 360 mL / OUT: 700 mL / NET: -340 mL    26 Jan 2019 07:01  -  26 Jan 2019 21:37  --------------------------------------------------------  IN: 1149.7 mL / OUT: 270 mL / NET: 879.7 mL      ====================  NEW LABS:  ====================      01-26    142  |  109<H>  |  98<H>  ----------------------------<  94  3.9   |  19<L>  |  2.75<H>    Ca    9.3      26 Jan 2019 08:34  Phos  4.7     01-26  Mg     2.2     01-26    TPro  5.6<L>  /  Alb  1.9<L>  /  TBili  3.6<H>  /  DBili  x   /  AST  84<H>  /  ALT  40  /  AlkPhos  145<H>  01-26    PT/INR - ( 26 Jan 2019 08:34 )   PT: 35.6 sec;   INR: 2.99 ratio         PTT - ( 26 Jan 2019 08:34 )  PTT:50.1 sec  Creatine Kinase, Serum: 99 U/L (01-26-19 @ 08:34)    CKMB Units: 2.4 ng/mL (01-26 @ 08:34)    ABG - ( 26 Jan 2019 08:29 )  pH, Arterial: 7.48  pH, Blood: x     /  pCO2: 28    /  pO2: 201   / HCO3: 20    / Base Excess: -2.3  /  SaO2: 99          ====================  PLAN:  ====================  Fede Oakes [facility] NP  Beeper #[id number]  Spectra # [phone] ====================  CCU MIDNIGHT ROUNDS  ====================    JOSUÉ RAMIREZ  [id number]  Patient is a 78y old  Male who presents with a chief complaint of 3 months of progressive B/L LE oedema and dyspnoea on exertion. (26 Jan 2019 10:36)      ====================  SUMMARY: 77 y/o male with PMH of HTN, CHF, CAD s/p PCI, AFib on coumadin, DM (on metformin) and severe AS presented to his PCP with progressive dyspnea and bilateral LE edema for 3 months. Pt was admitted for acute decompensated heart failure and treated with Lasix.  Hospital course complicated by acute hypoxic respiratory failure requiring hiFlo Pt responded to diuresis and was transitioned from gtt to lasix TID. Pt also being followed by structural heart for AVR.  On 1/26, RRT was called on the floor when patient was found to be hypotensive, unarousable to sternal rub and fever. Pt placed on NRB, started on levophed gtt transferred to CCU for further management.  ====================      ====================  NEW EVENTS: Pt had an episode of hypotension and became pulseless. CPR and code blue initiated with epi x 1 and atropine x1, ROSC after 3 minutes. Pt was intubated for airway protection.   ====================    MEDICATIONS  (STANDING):  ALBUTerol/ipratropium for Nebulization 3 milliLiter(s) Nebulizer every 6 hours  atorvastatin 40 milliGRAM(s) Oral at bedtime  chlorhexidine 4% Liquid 1 Application(s) Topical <User Schedule>  clopidogrel Tablet 75 milliGRAM(s) Oral daily  dextrose 5%. 1000 milliLiter(s) (50 mL/Hr) IV Continuous <Continuous>  dextrose 50% Injectable 12.5 Gram(s) IV Push once  dextrose 50% Injectable 25 Gram(s) IV Push once  dextrose 50% Injectable 25 Gram(s) IV Push once  finasteride 5 milliGRAM(s) Oral daily  insulin glargine Injectable (LANTUS) 9 Unit(s) SubCutaneous at bedtime  insulin lispro (HumaLOG) corrective regimen sliding scale   SubCutaneous three times a day before meals  insulin lispro (HumaLOG) corrective regimen sliding scale   SubCutaneous at bedtime  norepinephrine Infusion 0.2 MICROgram(s)/kG/Min (36.15 mL/Hr) IV Continuous <Continuous>  nystatin Powder 1 Application(s) Topical two times a day  piperacillin/tazobactam IVPB. 3.375 Gram(s) IV Intermittent every 12 hours  vancomycin  IVPB 1000 milliGRAM(s) IV Intermittent daily    MEDICATIONS  (PRN):  acetaminophen   Tablet .. 650 milliGRAM(s) Oral every 6 hours PRN Moderate Pain (4 - 6)  dextrose 40% Gel 15 Gram(s) Oral once PRN Blood Glucose LESS THAN 70 milliGRAM(s)/deciliter  glucagon  Injectable 1 milliGRAM(s) IntraMuscular once PRN Glucose LESS THAN 70 milligrams/deciliter      ====================  VITALS (Last 12 hrs):  ====================    T(C): 36.9 (01-26-19 @ 18:00), Max: 36.9 (01-26-19 @ 11:00)  T(F): 98.5 (01-26-19 @ 18:00), Max: 98.5 (01-26-19 @ 18:00)  HR: 78 (01-26-19 @ 21:30) (56 - 82)  BP: 90/59 (01-26-19 @ 21:30) (61/44 - 105/64)  BP(mean): 68 (01-26-19 @ 21:30) (49 - 78)  RR: 28 (01-26-19 @ 21:30) (18 - 28)  SpO2: 92% (01-26-19 @ 21:30) (91% - 99%)      I&O's Summary    25 Jan 2019 07:01  -  26 Jan 2019 07:00  --------------------------------------------------------  IN: 360 mL / OUT: 700 mL / NET: -340 mL    26 Jan 2019 07:01  -  26 Jan 2019 21:37  --------------------------------------------------------  IN: 1149.7 mL / OUT: 270 mL / NET: 879.7 mL      ====================  NEW LABS:  ====================      01-26    142  |  109<H>  |  98<H>  ----------------------------<  94  3.9   |  19<L>  |  2.75<H>    Ca    9.3      26 Jan 2019 08:34  Phos  4.7     01-26  Mg     2.2     01-26    TPro  5.6<L>  /  Alb  1.9<L>  /  TBili  3.6<H>  /  DBili  x   /  AST  84<H>  /  ALT  40  /  AlkPhos  145<H>  01-26    PT/INR - ( 26 Jan 2019 08:34 )   PT: 35.6 sec;   INR: 2.99 ratio         PTT - ( 26 Jan 2019 08:34 )  PTT:50.1 sec  Creatine Kinase, Serum: 99 U/L (01-26-19 @ 08:34)    CKMB Units: 2.4 ng/mL (01-26 @ 08:34)    ABG - ( 26 Jan 2019 08:29 )  pH, Arterial: 7.48  pH, Blood: x     /  pCO2: 28    /  pO2: 201   / HCO3: 20    / Base Excess: -2.3  /  SaO2: 99          ====================  PLAN:  ====================  #Neuro  Sedation  -Started on precedex  -AM sedation vacation    #Pulmo  Acute respiratory failure  -ETT to MV, PRVC mode 500/5/12/100%, lip line readjusted to 25cm  -Wean as tolerated in AM  -Trend ABG, CXR in AM    #Cardiac  s/p cardiac arrest   -Titrate levo gtt as tolerated    Acute decompensated HF  -Holding lasix due to hypotension  -Strict I+O, daily weights    CAD  -Continue DAPT, statin    AFib  -Continue heparin gtt per protocol    #Renal   ELADIA   -Trend SCr, holding diuretics  -Dose meds renally, avoid nephrotoxic meds    #Endo  -Continue lantus 9 units  -ISS     #ID   Sepsis  -(+) blood cultures on 1/26 with gram positive cocci in clusters  -Continue zosyn 3.375 (started 1/26)  -Continue vanco 1g (started 1/26), trough pre 3th dose 1/28       Violette Servin CCU NP  Beeper #[id number]  Spectra # [phone] ====================  CCU MIDNIGHT ROUNDS  ====================    JOSUÉ RAMIREZ  [id number]  Patient is a 78y old  Male who presents with a chief complaint of 3 months of progressive B/L LE oedema and dyspnoea on exertion. (26 Jan 2019 10:36)      ====================  SUMMARY: 79 y/o male with PMH of HTN, CHF, CAD s/p PCI, AFib on coumadin, DM (on metformin) and severe AS presented to his PCP with progressive dyspnea and bilateral LE edema for 3 months. Pt was admitted for acute decompensated heart failure and treated with Lasix.  Hospital course complicated by acute hypoxic respiratory failure requiring hiFlo Pt responded to diuresis and was transitioned from gtt to lasix TID. Pt also being followed by structural heart for AVR.  On 1/26, RRT was called on the floor when patient was found to be hypotensive, unarousable to sternal rub and fever. Pt placed on NRB, started on levophed gtt transferred to CCU for further management.  ====================      ====================  NEW EVENTS: Pt had an episode of hypotension and became pulseless. CPR and code blue initiated with epi x 1 and atropine x1, ROSC after 3 minutes. Pt was intubated for airway protection.   ====================    MEDICATIONS  (STANDING):  ALBUTerol/ipratropium for Nebulization 3 milliLiter(s) Nebulizer every 6 hours  atorvastatin 40 milliGRAM(s) Oral at bedtime  chlorhexidine 4% Liquid 1 Application(s) Topical <User Schedule>  clopidogrel Tablet 75 milliGRAM(s) Oral daily  dextrose 5%. 1000 milliLiter(s) (50 mL/Hr) IV Continuous <Continuous>  dextrose 50% Injectable 12.5 Gram(s) IV Push once  dextrose 50% Injectable 25 Gram(s) IV Push once  dextrose 50% Injectable 25 Gram(s) IV Push once  finasteride 5 milliGRAM(s) Oral daily  insulin glargine Injectable (LANTUS) 9 Unit(s) SubCutaneous at bedtime  insulin lispro (HumaLOG) corrective regimen sliding scale   SubCutaneous three times a day before meals  insulin lispro (HumaLOG) corrective regimen sliding scale   SubCutaneous at bedtime  norepinephrine Infusion 0.2 MICROgram(s)/kG/Min (36.15 mL/Hr) IV Continuous <Continuous>  nystatin Powder 1 Application(s) Topical two times a day  piperacillin/tazobactam IVPB. 3.375 Gram(s) IV Intermittent every 12 hours  vancomycin  IVPB 1000 milliGRAM(s) IV Intermittent daily    MEDICATIONS  (PRN):  acetaminophen   Tablet .. 650 milliGRAM(s) Oral every 6 hours PRN Moderate Pain (4 - 6)  dextrose 40% Gel 15 Gram(s) Oral once PRN Blood Glucose LESS THAN 70 milliGRAM(s)/deciliter  glucagon  Injectable 1 milliGRAM(s) IntraMuscular once PRN Glucose LESS THAN 70 milligrams/deciliter      ====================  VITALS (Last 12 hrs):  ====================    T(C): 36.9 (01-26-19 @ 18:00), Max: 36.9 (01-26-19 @ 11:00)  T(F): 98.5 (01-26-19 @ 18:00), Max: 98.5 (01-26-19 @ 18:00)  HR: 78 (01-26-19 @ 21:30) (56 - 82)  BP: 90/59 (01-26-19 @ 21:30) (61/44 - 105/64)  BP(mean): 68 (01-26-19 @ 21:30) (49 - 78)  RR: 28 (01-26-19 @ 21:30) (18 - 28)  SpO2: 92% (01-26-19 @ 21:30) (91% - 99%)      I&O's Summary    25 Jan 2019 07:01  -  26 Jan 2019 07:00  --------------------------------------------------------  IN: 360 mL / OUT: 700 mL / NET: -340 mL    26 Jan 2019 07:01  -  26 Jan 2019 21:37  --------------------------------------------------------  IN: 1149.7 mL / OUT: 270 mL / NET: 879.7 mL      ====================  NEW LABS:  ====================      01-26    142  |  109<H>  |  98<H>  ----------------------------<  94  3.9   |  19<L>  |  2.75<H>    Ca    9.3      26 Jan 2019 08:34  Phos  4.7     01-26  Mg     2.2     01-26    TPro  5.6<L>  /  Alb  1.9<L>  /  TBili  3.6<H>  /  DBili  x   /  AST  84<H>  /  ALT  40  /  AlkPhos  145<H>  01-26    PT/INR - ( 26 Jan 2019 08:34 )   PT: 35.6 sec;   INR: 2.99 ratio         PTT - ( 26 Jan 2019 08:34 )  PTT:50.1 sec  Creatine Kinase, Serum: 99 U/L (01-26-19 @ 08:34)    CKMB Units: 2.4 ng/mL (01-26 @ 08:34)    ABG - ( 26 Jan 2019 08:29 )  pH, Arterial: 7.48  pH, Blood: x     /  pCO2: 28    /  pO2: 201   / HCO3: 20    / Base Excess: -2.3  /  SaO2: 99          ====================  PLAN:  ====================  #Neuro  Sedation  -Started on precedex  -AM sedation vacation  -planned Head CT today    #Pulmo  Acute respiratory failure  -ETT to MV, PRVC mode 500/5/12/100%, lip line readjusted to 25cm  -Wean as tolerated in AM  -Trend ABG, CXR in AM    #Cardiac  s/p cardiac arrest   -Titrate levo gtt as tolerated  -TTE in the AM    Acute decompensated HF  -Holding lasix due to hypotension  -Strict I+O, daily weights    CAD  -Continue DAPT, statin    AFib  -Continue heparin gtt per protocol    Severe AS  -250ml Fluid bolus possible over diuresis  -Monitor UOP    #Renal   ELADIA   -Trend SCr, holding diuretics  -Dose meds renally, avoid nephrotoxic meds    #Endo  -Continue lantus 9 units  -ISS     #ID   Sepsis  -(+) blood cultures on 1/26 with gram positive cocci in clusters  -Daily Blood cultures  -Continue zosyn 3.375 (started 1/26)  -Continue vanco 1g (started 1/26), trough pre 3th dose 1/28   -Consult ID      Violette Servin CCU NP  Beeper #[id number]  Spectra # [phone]

## 2019-01-26 NOTE — PROVIDER CONTACT NOTE (OTHER) - BACKGROUND
78 year old male admitted with Heart failure
79yo M with PMH CAD s/p stents, A.fib (on coumadin until today), HTN, HLD, DM, worsening PEREZ x 3 months.
79yo M with PMH CAD s/p stents, A.fib (on coumadin until today), HTN, HLD, DM. Pt presents  with worsening PERZE x 3 months
Dx: CHF/ARF
Pt admitted for ARF 2/2 CHF.
Pt. admitted for CHF exacerbation. He was getting a TAVR work up for severe aortic stenosis. Pt. was due to to go for a cath on Monday.
pt admit with CHF exacerbation, on lasix gtt at 5cc/h, with morrison for retention
pt admit with CHF exacerbation, on lasix gtt at 5cc/h, with morrison for retention

## 2019-01-26 NOTE — PROGRESS NOTE ADULT - ATTENDING COMMENTS
ELADIA : in the setting of shock( cardiogenic/septic)/ Possibly also a component of MARK ( received CTA 1/24)  Renal function continues to worsen  Hold diuretics/Pressor support  Please dose vancomycin based on levels and not daily  Suspect his renal function will worsen over the next 24-48 hours  Remains critical  No indication for RRT today although he may need it in the next few days

## 2019-01-26 NOTE — CHART NOTE - NSCHARTNOTEFT_GEN_A_CORE
Night Medicine NP - Episodic    S/P RRT:     RRT called by RN for AMS and hypotension. Rapid response team to bedside with treatment and plan. Night hospitalist covering Dr. Hale to bedside. Pt transferred to CCU bed 12. See RRT event note and consult.     Vital Signs Last 24 Hrs  T(C): 39 (26 Jan 2019 08:00), Max: 39 (26 Jan 2019 08:00)  T(F): 102.2 (26 Jan 2019 08:00), Max: 102.2 (26 Jan 2019 08:00)  HR: 82 (26 Jan 2019 09:15) (72 - 90)  BP: 83/57 (26 Jan 2019 09:15) (83/57 - 105/71)  BP(mean): 63 (26 Jan 2019 09:15) (63 - 75)  RR: 22 (26 Jan 2019 08:30) (17 - 22)  SpO2: 95% (26 Jan 2019 09:15) (95% - 100%)                        9.6    17.1  )-----------( 246      ( 26 Jan 2019 09:04 )             28.9     01-26    142  |  109<H>  |  98<H>  ----------------------------<  94  3.9   |  19<L>  |  2.75<H>    Ca    9.3      26 Jan 2019 08:34  Phos  4.7     01-26  Mg     2.2     01-26    TPro  5.6<L>  /  Alb  1.9<L>  /  TBili  3.6<H>  /  DBili  x   /  AST  84<H>  /  ALT  40  /  AlkPhos  145<H>  01-26    PT/INR - ( 26 Jan 2019 08:34 )   PT: 35.6 sec;   INR: 2.99 ratio        Darling Olmedo ANP-BC  [id number]

## 2019-01-26 NOTE — PROGRESS NOTE ADULT - SUBJECTIVE AND OBJECTIVE BOX
[facility] Division of Kidney Diseases & Hypertension  FOLLOW UP NOTE  [phone]--------------------------------------------------------------------------------  Chief Complaint:Heart failure      24 hour events/subjective: Overnight patient had RRT called for unresponsiveness and hypotension. Patient moved to CCU with concerns of shock. Patient also noted to be febrile to 102.2. When seen at bedside patient is very lethargic and opens eyes to verbal stimuli but difficult to assess review of systems as patient is not answering appropriately. Patient continues to have 2+ edema of the lower extremities and distant lung sounds with some crackles and rales at bases.        PAST HISTORY  --------------------------------------------------------------------------------  No significant changes to PMH, PSH, FHx, SHx, unless otherwise noted    ALLERGIES & MEDICATIONS  --------------------------------------------------------------------------------  Allergies    latex (Unknown)  Sulfacetamide Sodium (Unknown)    Intolerances      Standing Inpatient Medications  ALBUTerol/ipratropium for Nebulization 3 milliLiter(s) Nebulizer every 6 hours  amLODIPine   Tablet 5 milliGRAM(s) Oral daily  atorvastatin 40 milliGRAM(s) Oral at bedtime  chlorhexidine 4% Liquid 1 Application(s) Topical <User Schedule>  clopidogrel Tablet 75 milliGRAM(s) Oral daily  dextrose 5%. 1000 milliLiter(s) IV Continuous <Continuous>  dextrose 50% Injectable 12.5 Gram(s) IV Push once  dextrose 50% Injectable 25 Gram(s) IV Push once  dextrose 50% Injectable 25 Gram(s) IV Push once  finasteride 5 milliGRAM(s) Oral daily  furosemide   Injectable 40 milliGRAM(s) IV Push every 12 hours  insulin glargine Injectable (LANTUS) 9 Unit(s) SubCutaneous at bedtime  insulin lispro (HumaLOG) corrective regimen sliding scale   SubCutaneous three times a day before meals  insulin lispro (HumaLOG) corrective regimen sliding scale   SubCutaneous at bedtime  norepinephrine Infusion 0.2 MICROgram(s)/kG/Min IV Continuous <Continuous>  nystatin Powder 1 Application(s) Topical two times a day  piperacillin/tazobactam IVPB. 3.375 Gram(s) IV Intermittent every 12 hours  vancomycin  IVPB 1000 milliGRAM(s) IV Intermittent daily    PRN Inpatient Medications  acetaminophen   Tablet .. 650 milliGRAM(s) Oral every 6 hours PRN  dextrose 40% Gel 15 Gram(s) Oral once PRN  glucagon  Injectable 1 milliGRAM(s) IntraMuscular once PRN      REVIEW OF SYSTEMS: Unable to assess secondary to clinical condition.      All other systems were reviewed and are negative, except as noted.    VITALS/PHYSICAL EXAM  --------------------------------------------------------------------------------  T(C): 39 (01-26-19 @ 08:00), Max: 39 (01-26-19 @ 08:00)  HR: 72 (01-26-19 @ 10:00) (72 - 90)  BP: 88/58 (01-26-19 @ 10:00) (83/57 - 105/71)  RR: 20 (01-26-19 @ 10:00) (17 - 22)  SpO2: 97% (01-26-19 @ 10:00) (95% - 100%)  Wt(kg): --        01-25-19 @ 07:01  -  01-26-19 @ 07:00  --------------------------------------------------------  IN: 360 mL / OUT: 700 mL / NET: -340 mL    01-26-19 @ 07:01  -  01-26-19 @ 10:36  --------------------------------------------------------  IN: 210.7 mL / OUT: 100 mL / NET: 110.7 mL      Physical Exam:  	Gen: Lethargic, moderate distress  	HEENT: PERRL, supple neck, clear oropharynx  	Pulm: Rales at bases and crackles throughout however difficult to assess posterior lung fields.  	CV: RRR, S1S2;  	Back: No spinal or CVA tenderness  	Abd: +BS, soft, nontender/nondistended  	: No suprapubic tenderness              Extremities: 2+ edema of lower extremities noted.               Neuro: No focal deficits, intact gait  	Skin: Warm, without rashes  	Vascular access: N/A    LABS/STUDIES  --------------------------------------------------------------------------------              9.6    17.1  >-----------<  246      [01-26-19 @ 09:04]              28.9     142  |  109  |  98  ----------------------------<  94      [01-26-19 @ 08:34]  3.9   |  19  |  2.75        Ca     9.3     [01-26-19 @ 08:34]      Mg     2.2     [01-26-19 @ 08:34]      Phos  4.7     [01-26-19 @ 08:34]    TPro  5.6  /  Alb  1.9  /  TBili  3.6  /  DBili  x   /  AST  84  /  ALT  40  /  AlkPhos  145  [01-26-19 @ 08:34]    PT/INR: PT 35.6 , INR 2.99       [01-26-19 @ 08:34]  PTT: 50.1       [01-26-19 @ 08:34]    CK 99      [01-26-19 @ 08:34]    Creatinine Trend:  SCr 2.75 [01-26 @ 08:34]  SCr 2.63 [01-26 @ 06:05]  SCr 2.35 [01-25 @ 06:32]  SCr 2.20 [01-24 @ 18:13]  SCr 2.24 [01-24 @ 05:41]          HBsAg Nonreact      [01-25-19 @ 15:50]  HCV 0.06, Nonreact      [01-25-19 @ 15:50]

## 2019-01-27 DIAGNOSIS — A41.9 SEPSIS, UNSPECIFIED ORGANISM: ICD-10-CM

## 2019-01-27 LAB
ALBUMIN SERPL ELPH-MCNC: 1.6 G/DL — LOW (ref 3.3–5)
ALBUMIN SERPL ELPH-MCNC: 1.7 G/DL — LOW (ref 3.3–5)
ALBUMIN SERPL ELPH-MCNC: 1.8 G/DL — LOW (ref 3.3–5)
ALBUMIN SERPL ELPH-MCNC: 1.9 G/DL — LOW (ref 3.3–5)
ALP SERPL-CCNC: 135 U/L — HIGH (ref 40–120)
ALP SERPL-CCNC: 157 U/L — HIGH (ref 40–120)
ALP SERPL-CCNC: 159 U/L — HIGH (ref 40–120)
ALP SERPL-CCNC: 176 U/L — HIGH (ref 40–120)
ALT FLD-CCNC: 31 U/L — SIGNIFICANT CHANGE UP (ref 10–45)
ALT FLD-CCNC: 38 U/L — SIGNIFICANT CHANGE UP (ref 10–45)
ALT FLD-CCNC: 42 U/L — SIGNIFICANT CHANGE UP (ref 10–45)
ALT FLD-CCNC: 43 U/L — SIGNIFICANT CHANGE UP (ref 10–45)
ANION GAP SERPL CALC-SCNC: 14 MMOL/L — SIGNIFICANT CHANGE UP (ref 5–17)
ANION GAP SERPL CALC-SCNC: 16 MMOL/L — SIGNIFICANT CHANGE UP (ref 5–17)
ANION GAP SERPL CALC-SCNC: 18 MMOL/L — HIGH (ref 5–17)
ANION GAP SERPL CALC-SCNC: 18 MMOL/L — HIGH (ref 5–17)
APTT BLD: 53.1 SEC — HIGH (ref 27.5–36.3)
AST SERPL-CCNC: 100 U/L — HIGH (ref 10–40)
AST SERPL-CCNC: 103 U/L — HIGH (ref 10–40)
AST SERPL-CCNC: 70 U/L — HIGH (ref 10–40)
AST SERPL-CCNC: 94 U/L — HIGH (ref 10–40)
BASE EXCESS BLDV CALC-SCNC: -5.5 MMOL/L — LOW (ref -2–2)
BILIRUB SERPL-MCNC: 4 MG/DL — HIGH (ref 0.2–1.2)
BILIRUB SERPL-MCNC: 5.1 MG/DL — HIGH (ref 0.2–1.2)
BILIRUB SERPL-MCNC: 5.2 MG/DL — HIGH (ref 0.2–1.2)
BILIRUB SERPL-MCNC: 6 MG/DL — HIGH (ref 0.2–1.2)
BUN SERPL-MCNC: 102 MG/DL — HIGH (ref 7–23)
BUN SERPL-MCNC: 104 MG/DL — HIGH (ref 7–23)
BUN SERPL-MCNC: 106 MG/DL — HIGH (ref 7–23)
BUN SERPL-MCNC: 98 MG/DL — HIGH (ref 7–23)
CALCIUM SERPL-MCNC: 8 MG/DL — LOW (ref 8.4–10.5)
CALCIUM SERPL-MCNC: 8 MG/DL — LOW (ref 8.4–10.5)
CALCIUM SERPL-MCNC: 8.5 MG/DL — SIGNIFICANT CHANGE UP (ref 8.4–10.5)
CALCIUM SERPL-MCNC: 8.9 MG/DL — SIGNIFICANT CHANGE UP (ref 8.4–10.5)
CHLORIDE SERPL-SCNC: 108 MMOL/L — SIGNIFICANT CHANGE UP (ref 96–108)
CHLORIDE SERPL-SCNC: 109 MMOL/L — HIGH (ref 96–108)
CHLORIDE SERPL-SCNC: 111 MMOL/L — HIGH (ref 96–108)
CHLORIDE SERPL-SCNC: 111 MMOL/L — HIGH (ref 96–108)
CK MB BLD-MCNC: 4.8 % — HIGH (ref 0–3.5)
CK MB BLD-MCNC: 5.1 % — HIGH (ref 0–3.5)
CK MB BLD-MCNC: 5.1 % — HIGH (ref 0–3.5)
CK MB CFR SERPL CALC: 6.1 NG/ML — SIGNIFICANT CHANGE UP (ref 0–6.7)
CK MB CFR SERPL CALC: 7.2 NG/ML — HIGH (ref 0–6.7)
CK MB CFR SERPL CALC: 9.2 NG/ML — HIGH (ref 0–6.7)
CK SERPL-CCNC: 119 U/L — SIGNIFICANT CHANGE UP (ref 30–200)
CK SERPL-CCNC: 150 U/L — SIGNIFICANT CHANGE UP (ref 30–200)
CK SERPL-CCNC: 179 U/L — SIGNIFICANT CHANGE UP (ref 30–200)
CO2 BLDV-SCNC: 23 MMOL/L — SIGNIFICANT CHANGE UP (ref 22–30)
CO2 SERPL-SCNC: 15 MMOL/L — LOW (ref 22–31)
CO2 SERPL-SCNC: 16 MMOL/L — LOW (ref 22–31)
CO2 SERPL-SCNC: 18 MMOL/L — LOW (ref 22–31)
CO2 SERPL-SCNC: 22 MMOL/L — SIGNIFICANT CHANGE UP (ref 22–31)
CREAT ?TM UR-MCNC: 101 MG/DL — SIGNIFICANT CHANGE UP
CREAT SERPL-MCNC: 2.53 MG/DL — HIGH (ref 0.5–1.3)
CREAT SERPL-MCNC: 2.93 MG/DL — HIGH (ref 0.5–1.3)
CREAT SERPL-MCNC: 2.99 MG/DL — HIGH (ref 0.5–1.3)
CREAT SERPL-MCNC: 3.11 MG/DL — HIGH (ref 0.5–1.3)
GAS PNL BLDA: SIGNIFICANT CHANGE UP
GAS PNL BLDV: SIGNIFICANT CHANGE UP
GLUCOSE BLDC GLUCOMTR-MCNC: 79 MG/DL — SIGNIFICANT CHANGE UP (ref 70–99)
GLUCOSE BLDC GLUCOMTR-MCNC: 81 MG/DL — SIGNIFICANT CHANGE UP (ref 70–99)
GLUCOSE BLDC GLUCOMTR-MCNC: 85 MG/DL — SIGNIFICANT CHANGE UP (ref 70–99)
GLUCOSE SERPL-MCNC: 110 MG/DL — HIGH (ref 70–99)
GLUCOSE SERPL-MCNC: 86 MG/DL — SIGNIFICANT CHANGE UP (ref 70–99)
GLUCOSE SERPL-MCNC: 90 MG/DL — SIGNIFICANT CHANGE UP (ref 70–99)
GLUCOSE SERPL-MCNC: 99 MG/DL — SIGNIFICANT CHANGE UP (ref 70–99)
GRAM STN FLD: SIGNIFICANT CHANGE UP
HCO3 BLDV-SCNC: 21 MMOL/L — SIGNIFICANT CHANGE UP (ref 21–29)
HCT VFR BLD CALC: 31.7 % — LOW (ref 39–50)
HCT VFR BLD CALC: 33.5 % — LOW (ref 39–50)
HGB BLD-MCNC: 10.4 G/DL — LOW (ref 13–17)
HGB BLD-MCNC: 10.9 G/DL — LOW (ref 13–17)
HOROWITZ INDEX BLDV+IHG-RTO: 80 — SIGNIFICANT CHANGE UP
INR BLD: 2.85 RATIO — HIGH (ref 0.88–1.16)
LACTATE BLDV-MCNC: 2.2 MMOL/L — HIGH (ref 0.7–2)
MAGNESIUM SERPL-MCNC: 1.9 MG/DL — SIGNIFICANT CHANGE UP (ref 1.6–2.6)
MAGNESIUM SERPL-MCNC: 2.2 MG/DL — SIGNIFICANT CHANGE UP (ref 1.6–2.6)
MAGNESIUM SERPL-MCNC: 2.3 MG/DL — SIGNIFICANT CHANGE UP (ref 1.6–2.6)
MAGNESIUM SERPL-MCNC: 2.3 MG/DL — SIGNIFICANT CHANGE UP (ref 1.6–2.6)
MCHC RBC-ENTMCNC: 29.6 PG — SIGNIFICANT CHANGE UP (ref 27–34)
MCHC RBC-ENTMCNC: 29.7 PG — SIGNIFICANT CHANGE UP (ref 27–34)
MCHC RBC-ENTMCNC: 32.7 GM/DL — SIGNIFICANT CHANGE UP (ref 32–36)
MCHC RBC-ENTMCNC: 32.7 GM/DL — SIGNIFICANT CHANGE UP (ref 32–36)
MCV RBC AUTO: 90.4 FL — SIGNIFICANT CHANGE UP (ref 80–100)
MCV RBC AUTO: 90.9 FL — SIGNIFICANT CHANGE UP (ref 80–100)
OSMOLALITY SERPL: 331 MOS/KG — HIGH (ref 275–300)
PCO2 BLDV: 51 MMHG — HIGH (ref 35–50)
PH BLDV: 7.25 — LOW (ref 7.35–7.45)
PHOSPHATE SERPL-MCNC: 5.2 MG/DL — HIGH (ref 2.5–4.5)
PHOSPHATE SERPL-MCNC: 6.9 MG/DL — HIGH (ref 2.5–4.5)
PHOSPHATE SERPL-MCNC: 7.1 MG/DL — HIGH (ref 2.5–4.5)
PHOSPHATE SERPL-MCNC: 7.4 MG/DL — HIGH (ref 2.5–4.5)
PLATELET # BLD AUTO: 273 K/UL — SIGNIFICANT CHANGE UP (ref 150–400)
PLATELET # BLD AUTO: 293 K/UL — SIGNIFICANT CHANGE UP (ref 150–400)
PO2 BLDV: 49 MMHG — HIGH (ref 25–45)
POTASSIUM SERPL-MCNC: 4.4 MMOL/L — SIGNIFICANT CHANGE UP (ref 3.5–5.3)
POTASSIUM SERPL-MCNC: 4.7 MMOL/L — SIGNIFICANT CHANGE UP (ref 3.5–5.3)
POTASSIUM SERPL-MCNC: 4.9 MMOL/L — SIGNIFICANT CHANGE UP (ref 3.5–5.3)
POTASSIUM SERPL-MCNC: 5.4 MMOL/L — HIGH (ref 3.5–5.3)
POTASSIUM SERPL-SCNC: 4.4 MMOL/L — SIGNIFICANT CHANGE UP (ref 3.5–5.3)
POTASSIUM SERPL-SCNC: 4.7 MMOL/L — SIGNIFICANT CHANGE UP (ref 3.5–5.3)
POTASSIUM SERPL-SCNC: 4.9 MMOL/L — SIGNIFICANT CHANGE UP (ref 3.5–5.3)
POTASSIUM SERPL-SCNC: 5.4 MMOL/L — HIGH (ref 3.5–5.3)
PROT ?TM UR-MCNC: 343 MG/DL — HIGH (ref 0–12)
PROT SERPL-MCNC: 4.6 G/DL — LOW (ref 6–8.3)
PROT SERPL-MCNC: 5.4 G/DL — LOW (ref 6–8.3)
PROT SERPL-MCNC: 5.5 G/DL — LOW (ref 6–8.3)
PROT SERPL-MCNC: 5.7 G/DL — LOW (ref 6–8.3)
PROT/CREAT UR-RTO: 3.4 RATIO — HIGH (ref 0–0.2)
PROTHROM AB SERPL-ACNC: 33.9 SEC — HIGH (ref 10–12.9)
RBC # BLD: 3.5 M/UL — LOW (ref 4.2–5.8)
RBC # BLD: 3.68 M/UL — LOW (ref 4.2–5.8)
RBC # FLD: 24.2 % — HIGH (ref 10.3–14.5)
RBC # FLD: 24.5 % — HIGH (ref 10.3–14.5)
SAO2 % BLDV: 73 % — SIGNIFICANT CHANGE UP (ref 67–88)
SODIUM SERPL-SCNC: 142 MMOL/L — SIGNIFICANT CHANGE UP (ref 135–145)
SODIUM SERPL-SCNC: 143 MMOL/L — SIGNIFICANT CHANGE UP (ref 135–145)
SODIUM SERPL-SCNC: 144 MMOL/L — SIGNIFICANT CHANGE UP (ref 135–145)
SODIUM SERPL-SCNC: 147 MMOL/L — HIGH (ref 135–145)
SODIUM UR-SCNC: 29 MMOL/L — SIGNIFICANT CHANGE UP
SPECIMEN SOURCE: SIGNIFICANT CHANGE UP
SPECIMEN SOURCE: SIGNIFICANT CHANGE UP
TROPONIN T, HIGH SENSITIVITY RESULT: 109 NG/L — HIGH (ref 0–51)
TROPONIN T, HIGH SENSITIVITY RESULT: 148 NG/L — HIGH (ref 0–51)
TROPONIN T, HIGH SENSITIVITY RESULT: 178 NG/L — HIGH (ref 0–51)
VANCOMYCIN FLD-MCNC: 8.6 UG/ML — SIGNIFICANT CHANGE UP
WBC # BLD: 18.2 K/UL — HIGH (ref 3.8–10.5)
WBC # BLD: 19.5 K/UL — HIGH (ref 3.8–10.5)
WBC # FLD AUTO: 18.2 K/UL — HIGH (ref 3.8–10.5)
WBC # FLD AUTO: 19.5 K/UL — HIGH (ref 3.8–10.5)

## 2019-01-27 PROCEDURE — 99291 CRITICAL CARE FIRST HOUR: CPT

## 2019-01-27 PROCEDURE — 71045 X-RAY EXAM CHEST 1 VIEW: CPT | Mod: 26

## 2019-01-27 PROCEDURE — 93010 ELECTROCARDIOGRAM REPORT: CPT | Mod: 76

## 2019-01-27 PROCEDURE — 99233 SBSQ HOSP IP/OBS HIGH 50: CPT | Mod: GC

## 2019-01-27 RX ORDER — ACETAMINOPHEN 500 MG
650 TABLET ORAL EVERY 6 HOURS
Qty: 0 | Refills: 0 | Status: DISCONTINUED | OUTPATIENT
Start: 2019-01-27 | End: 2019-01-29

## 2019-01-27 RX ORDER — MAGNESIUM SULFATE 500 MG/ML
1 VIAL (ML) INJECTION ONCE
Qty: 0 | Refills: 0 | Status: COMPLETED | OUTPATIENT
Start: 2019-01-27 | End: 2019-01-27

## 2019-01-27 RX ORDER — PANTOPRAZOLE SODIUM 20 MG/1
40 TABLET, DELAYED RELEASE ORAL ONCE
Qty: 0 | Refills: 0 | Status: COMPLETED | OUTPATIENT
Start: 2019-01-27 | End: 2019-01-27

## 2019-01-27 RX ORDER — EPINEPHRINE 0.3 MG/.3ML
1 INJECTION INTRAMUSCULAR; SUBCUTANEOUS ONCE
Qty: 0 | Refills: 0 | Status: COMPLETED | OUTPATIENT
Start: 2019-01-27 | End: 2019-01-26

## 2019-01-27 RX ORDER — PHENYLEPHRINE HYDROCHLORIDE 10 MG/ML
1.5 INJECTION INTRAVENOUS
Qty: 160 | Refills: 0 | Status: DISCONTINUED | OUTPATIENT
Start: 2019-01-27 | End: 2019-01-29

## 2019-01-27 RX ORDER — NOREPINEPHRINE BITARTRATE/D5W 8 MG/250ML
0.05 PLASTIC BAG, INJECTION (ML) INTRAVENOUS
Qty: 16 | Refills: 0 | Status: DISCONTINUED | OUTPATIENT
Start: 2019-01-27 | End: 2019-01-27

## 2019-01-27 RX ORDER — VASOPRESSIN 20 [USP'U]/ML
0.04 INJECTION INTRAVENOUS
Qty: 100 | Refills: 0 | Status: DISCONTINUED | OUTPATIENT
Start: 2019-01-27 | End: 2019-01-29

## 2019-01-27 RX ORDER — NOREPINEPHRINE BITARTRATE/D5W 8 MG/250ML
0.1 PLASTIC BAG, INJECTION (ML) INTRAVENOUS
Qty: 16 | Refills: 0 | Status: DISCONTINUED | OUTPATIENT
Start: 2019-01-27 | End: 2019-01-27

## 2019-01-27 RX ORDER — SODIUM CHLORIDE 9 MG/ML
250 INJECTION INTRAMUSCULAR; INTRAVENOUS; SUBCUTANEOUS ONCE
Qty: 0 | Refills: 0 | Status: COMPLETED | OUTPATIENT
Start: 2019-01-27 | End: 2019-01-27

## 2019-01-27 RX ORDER — SODIUM BICARBONATE 1 MEQ/ML
50 SYRINGE (ML) INTRAVENOUS ONCE
Qty: 0 | Refills: 0 | Status: COMPLETED | OUTPATIENT
Start: 2019-01-27 | End: 2019-01-26

## 2019-01-27 RX ORDER — PANTOPRAZOLE SODIUM 20 MG/1
40 TABLET, DELAYED RELEASE ORAL DAILY
Qty: 0 | Refills: 0 | Status: DISCONTINUED | OUTPATIENT
Start: 2019-01-27 | End: 2019-01-29

## 2019-01-27 RX ORDER — INSULIN LISPRO 100/ML
VIAL (ML) SUBCUTANEOUS EVERY 6 HOURS
Qty: 0 | Refills: 0 | Status: DISCONTINUED | OUTPATIENT
Start: 2019-01-27 | End: 2019-01-29

## 2019-01-27 RX ORDER — NOREPINEPHRINE BITARTRATE/D5W 8 MG/250ML
1 PLASTIC BAG, INJECTION (ML) INTRAVENOUS
Qty: 16 | Refills: 0 | Status: DISCONTINUED | OUTPATIENT
Start: 2019-01-27 | End: 2019-01-28

## 2019-01-27 RX ORDER — MIDAZOLAM HYDROCHLORIDE 1 MG/ML
0.02 INJECTION, SOLUTION INTRAMUSCULAR; INTRAVENOUS
Qty: 100 | Refills: 0 | Status: DISCONTINUED | OUTPATIENT
Start: 2019-01-27 | End: 2019-01-29

## 2019-01-27 RX ADMIN — PHENYLEPHRINE HYDROCHLORIDE 27.11 MICROGRAM(S)/KG/MIN: 10 INJECTION INTRAVENOUS at 04:15

## 2019-01-27 RX ADMIN — FINASTERIDE 5 MILLIGRAM(S): 5 TABLET, FILM COATED ORAL at 11:24

## 2019-01-27 RX ADMIN — Medication 1 DROP(S): at 11:23

## 2019-01-27 RX ADMIN — PIPERACILLIN AND TAZOBACTAM 25 GRAM(S): 4; .5 INJECTION, POWDER, LYOPHILIZED, FOR SOLUTION INTRAVENOUS at 05:19

## 2019-01-27 RX ADMIN — PHENYLEPHRINE HYDROCHLORIDE 27.11 MICROGRAM(S)/KG/MIN: 10 INJECTION INTRAVENOUS at 08:02

## 2019-01-27 RX ADMIN — Medication 3 MILLILITER(S): at 11:41

## 2019-01-27 RX ADMIN — Medication 90.38 MICROGRAM(S)/KG/MIN: at 15:16

## 2019-01-27 RX ADMIN — Medication 650 MILLIGRAM(S): at 19:49

## 2019-01-27 RX ADMIN — Medication 100 GRAM(S): at 04:02

## 2019-01-27 RX ADMIN — Medication 4.52 MICROGRAM(S)/KG/MIN: at 12:29

## 2019-01-27 RX ADMIN — SODIUM CHLORIDE 250 MILLILITER(S): 9 INJECTION INTRAMUSCULAR; INTRAVENOUS; SUBCUTANEOUS at 08:17

## 2019-01-27 RX ADMIN — CHLORHEXIDINE GLUCONATE 1 APPLICATION(S): 213 SOLUTION TOPICAL at 05:07

## 2019-01-27 RX ADMIN — VASOPRESSIN 2.4 UNIT(S)/MIN: 20 INJECTION INTRAVENOUS at 08:01

## 2019-01-27 RX ADMIN — SODIUM CHLORIDE 250 MILLILITER(S): 9 INJECTION INTRAMUSCULAR; INTRAVENOUS; SUBCUTANEOUS at 15:18

## 2019-01-27 RX ADMIN — Medication 3 MILLILITER(S): at 01:40

## 2019-01-27 RX ADMIN — PANTOPRAZOLE SODIUM 40 MILLIGRAM(S): 20 TABLET, DELAYED RELEASE ORAL at 11:24

## 2019-01-27 RX ADMIN — Medication 90.38 MICROGRAM(S)/KG/MIN: at 18:43

## 2019-01-27 RX ADMIN — Medication 90.38 MICROGRAM(S)/KG/MIN: at 13:10

## 2019-01-27 RX ADMIN — INSULIN GLARGINE 9 UNIT(S): 100 INJECTION, SOLUTION SUBCUTANEOUS at 01:18

## 2019-01-27 RX ADMIN — Medication 1 APPLICATION(S): at 14:36

## 2019-01-27 RX ADMIN — Medication 1 APPLICATION(S): at 05:08

## 2019-01-27 RX ADMIN — Medication 650 MILLIGRAM(S): at 11:23

## 2019-01-27 RX ADMIN — Medication 650 MILLIGRAM(S): at 20:19

## 2019-01-27 RX ADMIN — ATORVASTATIN CALCIUM 40 MILLIGRAM(S): 80 TABLET, FILM COATED ORAL at 23:13

## 2019-01-27 RX ADMIN — Medication 650 MILLIGRAM(S): at 14:00

## 2019-01-27 RX ADMIN — CLOPIDOGREL BISULFATE 75 MILLIGRAM(S): 75 TABLET, FILM COATED ORAL at 11:24

## 2019-01-27 RX ADMIN — FENTANYL CITRATE 50 MICROGRAM(S): 50 INJECTION INTRAVENOUS at 00:05

## 2019-01-27 RX ADMIN — DEXMEDETOMIDINE HYDROCHLORIDE IN 0.9% SODIUM CHLORIDE 1.21 MICROGRAM(S)/KG/HR: 4 INJECTION INTRAVENOUS at 08:02

## 2019-01-27 RX ADMIN — PANTOPRAZOLE SODIUM 40 MILLIGRAM(S): 20 TABLET, DELAYED RELEASE ORAL at 04:02

## 2019-01-27 RX ADMIN — Medication 1 DROP(S): at 06:34

## 2019-01-27 RX ADMIN — NYSTATIN CREAM 1 APPLICATION(S): 100000 CREAM TOPICAL at 05:20

## 2019-01-27 RX ADMIN — Medication 90.38 MICROGRAM(S)/KG/MIN: at 00:15

## 2019-01-27 RX ADMIN — SODIUM CHLORIDE 500 MILLILITER(S): 9 INJECTION INTRAMUSCULAR; INTRAVENOUS; SUBCUTANEOUS at 04:03

## 2019-01-27 RX ADMIN — PIPERACILLIN AND TAZOBACTAM 25 GRAM(S): 4; .5 INJECTION, POWDER, LYOPHILIZED, FOR SOLUTION INTRAVENOUS at 17:52

## 2019-01-27 RX ADMIN — Medication 1 DROP(S): at 23:15

## 2019-01-27 RX ADMIN — NYSTATIN CREAM 1 APPLICATION(S): 100000 CREAM TOPICAL at 17:52

## 2019-01-27 RX ADMIN — Medication 1 APPLICATION(S): at 23:15

## 2019-01-27 RX ADMIN — INSULIN GLARGINE 9 UNIT(S): 100 INJECTION, SOLUTION SUBCUTANEOUS at 23:52

## 2019-01-27 RX ADMIN — MIDAZOLAM HYDROCHLORIDE 1.93 MG/KG/HR: 1 INJECTION, SOLUTION INTRAMUSCULAR; INTRAVENOUS at 11:39

## 2019-01-27 RX ADMIN — Medication 3 MILLILITER(S): at 07:00

## 2019-01-27 RX ADMIN — DEXMEDETOMIDINE HYDROCHLORIDE IN 0.9% SODIUM CHLORIDE 1.21 MICROGRAM(S)/KG/HR: 4 INJECTION INTRAVENOUS at 00:14

## 2019-01-27 RX ADMIN — Medication 3 MILLILITER(S): at 17:29

## 2019-01-27 RX ADMIN — Medication 1 DROP(S): at 17:52

## 2019-01-27 RX ADMIN — Medication 90.38 MICROGRAM(S)/KG/MIN: at 08:01

## 2019-01-27 NOTE — PROGRESS NOTE ADULT - SUBJECTIVE AND OBJECTIVE BOX
[facility] Division of Kidney Diseases & Hypertension  FOLLOW UP NOTE  [phone]--------------------------------------------------------------------------------  Chief Complaint:Heart failure      24 hour events/subjective: Overnight patient had PEA arrest and achieved ROSC after ~2-3 mins (1 round of CPR.) Patient was intubated for airway protection producing thick sputum with concerns for aspiration. Patient also has GPCs in blood. Now on Levophed, Vasopressin and Phenylephrine.        PAST HISTORY  --------------------------------------------------------------------------------  No significant changes to PMH, PSH, FHx, SHx, unless otherwise noted    ALLERGIES & MEDICATIONS  --------------------------------------------------------------------------------  Allergies    latex (Unknown)  Sulfacetamide Sodium (Unknown)    Intolerances      Standing Inpatient Medications  ALBUTerol/ipratropium for Nebulization 3 milliLiter(s) Nebulizer every 6 hours  artificial tears (preservative free) Ophthalmic Solution 1 Drop(s) Both EYES every 6 hours  atorvastatin 40 milliGRAM(s) Oral at bedtime  chlorhexidine 4% Liquid 1 Application(s) Topical <User Schedule>  clopidogrel Tablet 75 milliGRAM(s) Oral daily  dexmedetomidine Infusion 0.05 MICROgram(s)/kG/Hr IV Continuous <Continuous>  dextrose 5%. 1000 milliLiter(s) IV Continuous <Continuous>  dextrose 50% Injectable 12.5 Gram(s) IV Push once  dextrose 50% Injectable 25 Gram(s) IV Push once  dextrose 50% Injectable 25 Gram(s) IV Push once  finasteride 5 milliGRAM(s) Oral daily  insulin glargine Injectable (LANTUS) 9 Unit(s) SubCutaneous at bedtime  insulin lispro (HumaLOG) corrective regimen sliding scale   SubCutaneous every 6 hours  norepinephrine Infusion 0.5 MICROgram(s)/kG/Min IV Continuous <Continuous>  nystatin Powder 1 Application(s) Topical two times a day  pantoprazole  Injectable 40 milliGRAM(s) IV Push daily  petrolatum Ophthalmic Ointment 1 Application(s) Both EYES every 8 hours  phenylephrine    Infusion 1.5 MICROgram(s)/kG/Min IV Continuous <Continuous>  piperacillin/tazobactam IVPB. 3.375 Gram(s) IV Intermittent every 12 hours  vancomycin  IVPB 1000 milliGRAM(s) IV Intermittent daily  vasopressin Infusion 0.04 Unit(s)/Min IV Continuous <Continuous>    PRN Inpatient Medications  acetaminophen   Tablet .. 650 milliGRAM(s) Oral every 6 hours PRN  dextrose 40% Gel 15 Gram(s) Oral once PRN  glucagon  Injectable 1 milliGRAM(s) IntraMuscular once PRN      REVIEW OF SYSTEMS: Unable to obtain 2/2 clinical condition.      All other systems were reviewed and are negative, except as noted.    VITALS/PHYSICAL EXAM  --------------------------------------------------------------------------------  T(C): 37.2 (01-26-19 @ 22:00), Max: 39 (01-26-19 @ 08:00)  HR: 98 (01-27-19 @ 07:20) (56 - 110)  BP: 103/77 (01-27-19 @ 00:15) (61/44 - 105/64)  RR: 24 (01-27-19 @ 06:15) (12 - 36)  SpO2: 98% (01-27-19 @ 07:20) (70% - 100%)  Wt(kg): --        01-26-19 @ 07:01  -  01-27-19 @ 07:00  --------------------------------------------------------  IN: 2611.7 mL / OUT: 325 mL / NET: 2286.7 mL      Physical Exam:  	Gen: Intubated, sedated  	HEENT: PERRL, supple neck, clear oropharynx  	Pulm: Coarse breath sounds, mechanical.  	CV: RRR, S1S2;  	Back: No spinal or CVA tenderness  	Abd: +BS, soft, nontender/nondistended  	: No suprapubic tenderness              Extremities: 1/2+ edema of LE.              Neuro: No focal deficits, intact gait  	Skin: Warm, without rashes  	Vascular access: N/A    LABS/STUDIES  --------------------------------------------------------------------------------              10.9   18.2  >-----------<  293      [01-27-19 @ 04:08]              33.5     144  |  108  |  102  ----------------------------<  99      [01-27-19 @ 04:08]  4.7   |  18  |  2.93        Ca     8.9     [01-27-19 @ 04:08]      Mg     2.2     [01-27-19 @ 04:08]      Phos  7.1     [01-27-19 @ 04:08]    TPro  5.7  /  Alb  1.9  /  TBili  5.1  /  DBili  x   /  AST  103  /  ALT  43  /  AlkPhos  176  [01-27-19 @ 04:08]    PT/INR: PT 33.9 , INR 2.85       [01-27-19 @ 04:08]  PTT: 53.1       [01-27-19 @ 04:08]          [01-27-19 @ 04:08]  Serum Osmolality 331      [01-27-19 @ 04:08]    Creatinine Trend:  SCr 2.93 [01-27 @ 04:08]  SCr 2.53 [01-26 @ 23:25]  SCr 2.75 [01-26 @ 08:34]  SCr 2.63 [01-26 @ 06:05]  SCr 2.35 [01-25 @ 06:32]    Urinalysis - [01-26-19 @ 20:40]      Color [place]racia / Appearance Slightly Turbid / SG 1.019 / pH 6.0      Gluc Negative / Ketone Negative  / Bili Negative / Urobili 2 mg/dL       Blood Moderate / Protein 30 mg/dL / Leuk Est Large / Nitrite Negative      RBC 30 / WBC >50 / Hyaline 2 / Gran  / Sq Epi  / Non Sq Epi 2 / Bacteria Many        HBsAg Nonreact      [01-25-19 @ 15:50]  HCV 0.06, Nonreact      [01-25-19 @ 15:50]

## 2019-01-27 NOTE — PROGRESS NOTE ADULT - PROBLEM SELECTOR PLAN 1
Patients renal injury was initially improving with diuresis and likely had nephrosarca. Now patient likely has multifactorial etiology occurring with septic/cardiogenic shock in setting of RV failure and severe to critical AS or likely a combination of both leading to renal hypoperfusion which is also leading to encephalopathy. Patient now on pressors which can help mediate some of the hypotension however this process likely is at least somewhat volume mediated as well. As such would hold off on diuresis at this point and try giving small boluses of fluid back to the patient 100-250 ccs at a time to assess if mentation or hypotension start to improve and maintain urine output. Would keep close eye on lung exam as patient already has moderate effusions bilaterally per CT scan done recently however would benefit likely from fluid at this point both for preload and for possible sepsis. Antibiotics also should be initiated and aspiration seems to be a likely source. Agree with pressors for sepsis and severe AS. Urine output now ~0-10 cc/hr and creatinine will continue to climb. Continue to monitor BMP and UOP. Patients renal injury was initially improving with diuresis and likely had nephrosarca. Now with worsening renal function in the setting of  septic/cardiogenic shock in setting of RV failure and severe to critical AS /PEA arrest  Patient now intubated and with increasing pressor requirement    Urine output now ~0-10 cc/hr and creatinine continues to worsen    Will likely need to initiate dialysis in the next 24-48 hours  Please don't give vancomycin daily. Would check a level today and re dose only based on levels

## 2019-01-27 NOTE — PROGRESS NOTE ADULT - PROBLEM SELECTOR PLAN 2
Continuing to improve slowly however will require close monitoring of blood gas in the setting of new shock worsening  in the setting of  shock/worsening renal failure

## 2019-01-27 NOTE — PROGRESS NOTE ADULT - SUBJECTIVE AND OBJECTIVE BOX
Events:    Review Of Systems:  Constitutional: denies fever, chills, Fatigue   HEENT: denies Blurred vision, Eye Pain, Headache   Respiratory: denies Cough, Wheezing , Shortness of breath  Cardiovascular: denies Chest Pain, Palpitations,  PEREZ   Gastrointestinal: denies Abdominal Pain, Diarrhea, Constipation   Genitourinary: denies Nocturia, Dysuria, Incontinence  Extremities: denies Swelling, Joint Pain  Neurologic: denies Focal deficit, Paresthesias, Syncope  Lymphatic: denies Swelling, Lymphadenopathy   Skin: denies Rash, Ecchymoses, Wounds   Psychiatry: denies Depression, Suicidal/Homicidal Ideation, anxiety  [X ] 10 point review of systems is otherwise negative except as mentioned above         Medications:  acetaminophen   Tablet .. 650 milliGRAM(s) Oral every 6 hours PRN  ALBUTerol/ipratropium for Nebulization 3 milliLiter(s) Nebulizer every 6 hours  artificial tears (preservative free) Ophthalmic Solution 1 Drop(s) Both EYES every 6 hours  atorvastatin 40 milliGRAM(s) Oral at bedtime  chlorhexidine 4% Liquid 1 Application(s) Topical <User Schedule>  clopidogrel Tablet 75 milliGRAM(s) Oral daily  dexmedetomidine Infusion 0.05 MICROgram(s)/kG/Hr IV Continuous <Continuous>  dextrose 40% Gel 15 Gram(s) Oral once PRN  dextrose 5%. 1000 milliLiter(s) IV Continuous <Continuous>  dextrose 50% Injectable 12.5 Gram(s) IV Push once  dextrose 50% Injectable 25 Gram(s) IV Push once  dextrose 50% Injectable 25 Gram(s) IV Push once  finasteride 5 milliGRAM(s) Oral daily  glucagon  Injectable 1 milliGRAM(s) IntraMuscular once PRN  insulin glargine Injectable (LANTUS) 9 Unit(s) SubCutaneous at bedtime  insulin lispro (HumaLOG) corrective regimen sliding scale   SubCutaneous every 6 hours  norepinephrine Infusion 0.5 MICROgram(s)/kG/Min IV Continuous <Continuous>  nystatin Powder 1 Application(s) Topical two times a day  pantoprazole  Injectable 40 milliGRAM(s) IV Push daily  petrolatum Ophthalmic Ointment 1 Application(s) Both EYES every 8 hours  phenylephrine    Infusion 1.5 MICROgram(s)/kG/Min IV Continuous <Continuous>  piperacillin/tazobactam IVPB. 3.375 Gram(s) IV Intermittent every 12 hours  vancomycin  IVPB 1000 milliGRAM(s) IV Intermittent daily  vasopressin Infusion 0.04 Unit(s)/Min IV Continuous <Continuous>    PMH/PSH/FH/SH: [ ] Unchanged  Vitals:  T(C): 37.2 (19 @ 22:00), Max: 39 (19 @ 08:00)  HR: 98 (19 @ 07:20) (56 - 110)  BP: 103/77 (19 @ 00:15) (61/44 - 105/64)  BP(mean): 86 (19 @ 00:15) (49 - 86)  RR: 24 (19 @ 06:15) (12 - 36)  SpO2: 98% (19 @ 07:20) (70% - 100%)  Wt(kg): --  Daily     Daily Weight in k.5 (2019 05:15)  I&O's Summary    2019 07:01  -  2019 07:00  --------------------------------------------------------  IN: 2611.7 mL / OUT: 325 mL / NET: 2286.7 mL        Physical Exam:  Appearance: [ ] Normal [ ] NAD  Eyes: [ ] PERRL [ ] EOMI  HENT: [ ] Normal oral muscosa [ ]NC/AT  Cardiovascular: [ ] S1 [ ] S2 [ ] RRR [ ] No m/r/g [ ]No edema [ ] JVP  Procedural Access Site: [ ] No hematoma [ ] Non-tender to palpation [ ] 2+ pulse [ ] No bruit [ ] No Ecchymosis  Respiratory: [ ] Clear to auscultation bilaterally  Gastrointestinal: [ ] Soft [ ] Non-tender [ ] Non-distended [ ] BS+  Musculoskeletal: [ ] No clubbing [ ] No joint deformity   Neurologic: [ ] Non-focal  Lymphatic: [ ] No lymphadenopathy  Psychiatry: [ ] AAOx3 [ ] Mood & affect appropriate  Skin: [ ] No rashes [ ] No ecchymoses [ ] No cyanosis        144  |  108  |  102<H>  ----------------------------<  99  4.7   |  18<L>  |  2.93<H>    Ca    8.9      2019 04:08  Phos  7.1       Mg     2.2         TPro  5.7<L>  /  Alb  1.9<L>  /  TBili  5.1<H>  /  DBili  x   /  AST  103<H>  /  ALT  43  /  AlkPhos  176<H>      PT/INR - ( 2019 04:08 )   PT: 33.9 sec;   INR: 2.85 ratio         PTT - ( 2019 04:08 )  PTT:53.1 sec  CARDIAC MARKERS ( 2019 04:08 )  x     / x     / 179 U/L / x     / 9.2 ng/mL  CARDIAC MARKERS ( 2019 23:25 )  x     / x     / 119 U/L / x     / 6.1 ng/mL  CARDIAC MARKERS ( 2019 08:34 )  x     / x     / 99 U/L / x     / 2.4 ng/mL              ECG:    Echo:    Stress Testing:     Cath:    Imaging:    Interpretation of Telemetry: HPI:  Patient with a history of CAD with past PCI, chronic atrial fibrillation on Coumadin (patient was told by his office physician above to stop his Coumadin and go to the ER), essential HTN, severe AS, reported unspecified hemoccult positive stools in the office, type 2 DM on metformin, with the patient self referring to [place] directed by his office physician following an elevated INR and episode of epistaxis this AM following apparently 3 months of progressive dyspnoea and B/L LE oedema with poor aerobic functional status with dyspnoea with changing his clothes or ambulation to the  (15 Lazaro 2019 23:54).     Events:    Review Of Systems:  Constitutional: denies fever, chills, Fatigue   HEENT: denies Blurred vision, Eye Pain, Headache   Respiratory: denies Cough, Wheezing , Shortness of breath  Cardiovascular: denies Chest Pain, Palpitations,  PEREZ   Gastrointestinal: denies Abdominal Pain, Diarrhea, Constipation   Genitourinary: denies Nocturia, Dysuria, Incontinence  Extremities: denies Swelling, Joint Pain  Neurologic: denies Focal deficit, Paresthesias, Syncope  Lymphatic: denies Swelling, Lymphadenopathy   Skin: denies Rash, Ecchymoses, Wounds   Psychiatry: denies Depression, Suicidal/Homicidal Ideation, anxiety  [X ] 10 point review of systems is otherwise negative except as mentioned above         Medications:  acetaminophen   Tablet .. 650 milliGRAM(s) Oral every 6 hours PRN  ALBUTerol/ipratropium for Nebulization 3 milliLiter(s) Nebulizer every 6 hours  artificial tears (preservative free) Ophthalmic Solution 1 Drop(s) Both EYES every 6 hours  atorvastatin 40 milliGRAM(s) Oral at bedtime  chlorhexidine 4% Liquid 1 Application(s) Topical <User Schedule>  clopidogrel Tablet 75 milliGRAM(s) Oral daily  dexmedetomidine Infusion 0.05 MICROgram(s)/kG/Hr IV Continuous <Continuous>  dextrose 40% Gel 15 Gram(s) Oral once PRN  dextrose 5%. 1000 milliLiter(s) IV Continuous <Continuous>  dextrose 50% Injectable 12.5 Gram(s) IV Push once  dextrose 50% Injectable 25 Gram(s) IV Push once  dextrose 50% Injectable 25 Gram(s) IV Push once  finasteride 5 milliGRAM(s) Oral daily  glucagon  Injectable 1 milliGRAM(s) IntraMuscular once PRN  insulin glargine Injectable (LANTUS) 9 Unit(s) SubCutaneous at bedtime  insulin lispro (HumaLOG) corrective regimen sliding scale   SubCutaneous every 6 hours  norepinephrine Infusion 0.5 MICROgram(s)/kG/Min IV Continuous <Continuous>  nystatin Powder 1 Application(s) Topical two times a day  pantoprazole  Injectable 40 milliGRAM(s) IV Push daily  petrolatum Ophthalmic Ointment 1 Application(s) Both EYES every 8 hours  phenylephrine    Infusion 1.5 MICROgram(s)/kG/Min IV Continuous <Continuous>  piperacillin/tazobactam IVPB. 3.375 Gram(s) IV Intermittent every 12 hours  vancomycin  IVPB 1000 milliGRAM(s) IV Intermittent daily  vasopressin Infusion 0.04 Unit(s)/Min IV Continuous <Continuous>    PMH/PSH/FH/SH: [ ] Unchanged  Vitals:  T(C): 37.2 (19 @ 22:00), Max: 39 (19 @ 08:00)  HR: 98 (19 @ 07:20) (56 - 110)  BP: 103/77 (19 @ 00:15) (61/44 - 105/64)  BP(mean): 86 (19 @ 00:15) (49 - 86)  RR: 24 (19 @ 06:15) (12 - 36)  SpO2: 98% (19 @ 07:20) (70% - 100%)  Wt(kg): --  Daily     Daily Weight in k.5 (2019 05:15)  I&O's Summary    2019 07:01  -  2019 07:00  --------------------------------------------------------  IN: 2611.7 mL / OUT: 325 mL / NET: 2286.7 mL        Physical Exam:  Appearance: [ ] Normal [ ] NAD  Eyes: [ ] PERRL [ ] EOMI  HENT: [ ] Normal oral muscosa [ ]NC/AT  Cardiovascular: [ ] S1 [ ] S2 [ ] RRR [ ] No m/r/g [ ]No edema [ ] JVP  Procedural Access Site: [ ] No hematoma [ ] Non-tender to palpation [ ] 2+ pulse [ ] No bruit [ ] No Ecchymosis  Respiratory: [ ] Clear to auscultation bilaterally  Gastrointestinal: [ ] Soft [ ] Non-tender [ ] Non-distended [ ] BS+  Musculoskeletal: [ ] No clubbing [ ] No joint deformity   Neurologic: [ ] Non-focal  Lymphatic: [ ] No lymphadenopathy  Psychiatry: [ ] AAOx3 [ ] Mood & affect appropriate  Skin: [ ] No rashes [ ] No ecchymoses [ ] No cyanosis        144  |  108  |  102<H>  ----------------------------<  99  4.7   |  18<L>  |  2.93<H>    Ca    8.9      2019 04:08  Phos  7.1       Mg     2.2         TPro  5.7<L>  /  Alb  1.9<L>  /  TBili  5.1<H>  /  DBili  x   /  AST  103<H>  /  ALT  43  /  AlkPhos  176<H>      PT/INR - ( 2019 04:08 )   PT: 33.9 sec;   INR: 2.85 ratio         PTT - ( 2019 04:08 )  PTT:53.1 sec  CARDIAC MARKERS ( 2019 04:08 )  x     / x     / 179 U/L / x     / 9.2 ng/mL  CARDIAC MARKERS ( 2019 23:25 )  x     / x     / 119 U/L / x     / 6.1 ng/mL  CARDIAC MARKERS ( 2019 08:34 )  x     / x     / 99 U/L / x     / 2.4 ng/mL              ECG:    Echo:    Stress Testing:     Cath:    Imaging:    Interpretation of Telemetry: HPI:  Patient with a history of CAD with past PCI, chronic atrial fibrillation on Coumadin (patient was told by his office physician above to stop his Coumadin and go to the ER), essential HTN, severe AS, reported unspecified hemoccult positive stools in the office, type 2 DM on metformin, with the patient self referring to [place] directed by his office physician following an elevated INR and episode of epistaxis this AM following apparently 3 months of progressive dyspnoea and B/L LE oedema with poor aerobic functional status with dyspnoea with changing his clothes or ambulation to the BR (15 Lazaro 2019 23:54). During hospitalization pt was being work-up for a TAVR. n 1.26 pt has altered mental status w/ hypotension requiring pressors and tx to CCU for septic shock w/ MRSA bacteremia suspected aspiration event and found t be positive MRSA bacteremia c/b PEA arrest w/ ROSC following 1 minute.    Events: No acute events overnight. 250 blus given    Review Of Systems:  Constitutional: denies fever, chills, Fatigue   HEENT: denies Blurred vision, Eye Pain, Headache   Respiratory: denies Cough, Wheezing , Shortness of breath  Cardiovascular: denies Chest Pain, Palpitations,  PEREZ   Gastrointestinal: denies Abdominal Pain, Diarrhea, Constipation   Genitourinary: denies Nocturia, Dysuria, Incontinence  Extremities: denies Swelling, Joint Pain  Neurologic: denies Focal deficit, Paresthesias, Syncope  Lymphatic: denies Swelling, Lymphadenopathy   Skin: denies Rash, Ecchymoses, Wounds   Psychiatry: denies Depression, Suicidal/Homicidal Ideation, anxiety  [X ] 10 point review of systems is otherwise negative except as mentioned above         Medications:  acetaminophen   Tablet .. 650 milliGRAM(s) Oral every 6 hours PRN  ALBUTerol/ipratropium for Nebulization 3 milliLiter(s) Nebulizer every 6 hours  artificial tears (preservative free) Ophthalmic Solution 1 Drop(s) Both EYES every 6 hours  atorvastatin 40 milliGRAM(s) Oral at bedtime  chlorhexidine 4% Liquid 1 Application(s) Topical <User Schedule>  clopidogrel Tablet 75 milliGRAM(s) Oral daily  dexmedetomidine Infusion 0.05 MICROgram(s)/kG/Hr IV Continuous <Continuous>  dextrose 40% Gel 15 Gram(s) Oral once PRN  dextrose 5%. 1000 milliLiter(s) IV Continuous <Continuous>  dextrose 50% Injectable 12.5 Gram(s) IV Push once  dextrose 50% Injectable 25 Gram(s) IV Push once  dextrose 50% Injectable 25 Gram(s) IV Push once  finasteride 5 milliGRAM(s) Oral daily  glucagon  Injectable 1 milliGRAM(s) IntraMuscular once PRN  insulin glargine Injectable (LANTUS) 9 Unit(s) SubCutaneous at bedtime  insulin lispro (HumaLOG) corrective regimen sliding scale   SubCutaneous every 6 hours  norepinephrine Infusion 0.5 MICROgram(s)/kG/Min IV Continuous <Continuous>  nystatin Powder 1 Application(s) Topical two times a day  pantoprazole  Injectable 40 milliGRAM(s) IV Push daily  petrolatum Ophthalmic Ointment 1 Application(s) Both EYES every 8 hours  phenylephrine    Infusion 1.5 MICROgram(s)/kG/Min IV Continuous <Continuous>  piperacillin/tazobactam IVPB. 3.375 Gram(s) IV Intermittent every 12 hours  vancomycin  IVPB 1000 milliGRAM(s) IV Intermittent daily  vasopressin Infusion 0.04 Unit(s)/Min IV Continuous <Continuous>    PMH/PSH/FH/SH: [ ] Unchanged  Vitals:  T(C): 37.2 (19 @ 22:00), Max: 39 (19 @ 08:00)  HR: 98 (19 @ 07:20) (56 - 110)  BP: 103/77 (19 @ 00:15) (61/44 - 105/64)  BP(mean): 86 (19 @ 00:15) (49 - 86)  RR: 24 (19 @ 06:15) (12 - 36)  SpO2: 98% (19 @ 07:20) (70% - 100%)  Wt(kg): --  Daily     Daily Weight in k.5 (2019 05:15)  I&O's Summary    2019 07:01  -  2019 07:00  --------------------------------------------------------  IN: 2611.7 mL / OUT: 325 mL / NET: 2286.7 mL        Physical Exam:  Appearance: [ ] Normal [ ] NAD  Eyes: [ ] PERRL [ ] EOMI  HENT: [ ] Normal oral muscosa [ ]NC/AT  Cardiovascular: [ ] S1 [ ] S2 [ ] RRR [ ] No m/r/g [ ]No edema [ ] JVP  Procedural Access Site: [ ] No hematoma [ ] Non-tender to palpation [ ] 2+ pulse [ ] No bruit [ ] No Ecchymosis  Respiratory: [ ] Clear to auscultation bilaterally  Gastrointestinal: [ ] Soft [ ] Non-tender [ ] Non-distended [ ] BS+  Musculoskeletal: [ ] No clubbing [ ] No joint deformity   Neurologic: [ ] Non-focal  Lymphatic: [ ] No lymphadenopathy  Psychiatry: [ ] AAOx3 [ ] Mood & affect appropriate  Skin: [ ] No rashes [ ] No ecchymoses [ ] No cyanosis        144  |  108  |  102<H>  ----------------------------<  99  4.7   |  18<L>  |  2.93<H>    Ca    8.9      2019 04:08  Phos  7.1       Mg     2.2         TPro  5.7<L>  /  Alb  1.9<L>  /  TBili  5.1<H>  /  DBili  x   /  AST  103<H>  /  ALT  43  /  AlkPhos  176<H>      PT/INR - ( 2019 04:08 )   PT: 33.9 sec;   INR: 2.85 ratio         PTT - ( 2019 04:08 )  PTT:53.1 sec  CARDIAC MARKERS ( 2019 04:08 )  x     / x     / 179 U/L / x     / 9.2 ng/mL  CARDIAC MARKERS ( 2019 23:25 )  x     / x     / 119 U/L / x     / 6.1 ng/mL  CARDIAC MARKERS ( 2019 08:34 )  x     / x     / 99 U/L / x     / 2.4 ng/mL              ECG:    Echo:    Stress Testing:     Cath:    Imaging:    Interpretation of Telemetry: HPI:  Patient with a history of CAD with past PCI, chronic atrial fibrillation on Coumadin (patient was told by his office physician above to stop his Coumadin and go to the ER), essential HTN, severe AS, reported unspecified hemoccult positive stools in the office, type 2 DM on metformin, with the patient self referring to [place] directed by his office physician following an elevated INR and episode of epistaxis this AM following apparently 3 months of progressive dyspnoea and B/L LE oedema with poor aerobic functional status with dyspnoea with changing his clothes or ambulation to the BR (15 Lazaro 2019 23:54). During hospitalization pt was being work-up for a TAVR. n 1.26 pt has altered mental status w/ hypotension requiring pressors and tx to CCU for septic shock w/ MRSA bacteremia suspected aspiration event and found t be positive MRSA bacteremia c/b PEA arrest w/ ROSC following 1 minute.    Events: No acute events overnight. 250 blus given for CVP 6.     Review Of Systems:  Pt intubated and sedated.     Medications:  acetaminophen   Tablet .. 650 milliGRAM(s) Oral every 6 hours PRN  ALBUTerol/ipratropium for Nebulization 3 milliLiter(s) Nebulizer every 6 hours  artificial tears (preservative free) Ophthalmic Solution 1 Drop(s) Both EYES every 6 hours  atorvastatin 40 milliGRAM(s) Oral at bedtime  chlorhexidine 4% Liquid 1 Application(s) Topical <User Schedule>  clopidogrel Tablet 75 milliGRAM(s) Oral daily  dexmedetomidine Infusion 0.05 MICROgram(s)/kG/Hr IV Continuous <Continuous>  dextrose 40% Gel 15 Gram(s) Oral once PRN  dextrose 5%. 1000 milliLiter(s) IV Continuous <Continuous>  dextrose 50% Injectable 12.5 Gram(s) IV Push once  dextrose 50% Injectable 25 Gram(s) IV Push once  dextrose 50% Injectable 25 Gram(s) IV Push once  finasteride 5 milliGRAM(s) Oral daily  glucagon  Injectable 1 milliGRAM(s) IntraMuscular once PRN  insulin glargine Injectable (LANTUS) 9 Unit(s) SubCutaneous at bedtime  insulin lispro (HumaLOG) corrective regimen sliding scale   SubCutaneous every 6 hours  norepinephrine Infusion 0.5 MICROgram(s)/kG/Min IV Continuous <Continuous>  nystatin Powder 1 Application(s) Topical two times a day  pantoprazole  Injectable 40 milliGRAM(s) IV Push daily  petrolatum Ophthalmic Ointment 1 Application(s) Both EYES every 8 hours  phenylephrine    Infusion 1.5 MICROgram(s)/kG/Min IV Continuous <Continuous>  piperacillin/tazobactam IVPB. 3.375 Gram(s) IV Intermittent every 12 hours  vancomycin  IVPB 1000 milliGRAM(s) IV Intermittent daily  vasopressin Infusion 0.04 Unit(s)/Min IV Continuous <Continuous>    Vitals:  T(C): 37.2 (19 @ 22:00), Max: 39 (19 @ 08:00)  HR: 98 (19 @ 07:20) (56 - 110)  BP: 103/77 (19 @ 00:15) (61/44 - 105/64)  BP(mean): 86 (19 @ 00:15) (49 - 86)  RR: 24 (19 @ 06:15) (12 - 36)  SpO2: 98% (19 @ 07:20) (70% - 100%)    Daily     Daily Weight in k.5 (2019 05:15)  I&O's Summary    2019 07:01  -  2019 07:00  --------------------------------------------------------  IN: 2611.7 mL / OUT: 325 mL / NET: 2286.7 mL    Physical Exam:  Appearance: sedated  Eyes: [ x] PERRL [ x] EOMI  HENT: [x ] Normal oral muscosa [x ]NC/AT  Cardiovascular: [x ] S1 [ x] S2 irregular rate.  no edema. + murmurs  Respiratory: [x ] Clear to auscultation bilaterally  Gastrointestinal: [x ] Soft [ x] Non-tender [ x] Non-distended   Musculoskeletal: [x] No clubbing [x ] No joint deformity   Neurologic: [x ] Non-focal  Lymphatic: [x ] No lymphadenopathy  Psychiatry: [ x] AAOx3 [ x] Mood & affect appropriate  Skin: [x ] No rashes [x ] No ecchymoses [x ] No cyanosis    01-27    144  |  108  |  102<H>  ----------------------------<  99  4.7   |  18<L>  |  2.93<H>    Ca    8.9      2019 04:08  Phos  7.1       Mg     2.2         TPro  5.7<L>  /  Alb  1.9<L>  /  TBili  5.1<H>  /  DBili  x   /  AST  103<H>  /  ALT  43  /  AlkPhos  176<H>      PT/INR - ( 2019 04:08 )   PT: 33.9 sec;   INR: 2.85 ratio         PTT - ( 2019 04:08 )  PTT:53.1 sec  CARDIAC MARKERS ( 2019 04:08 )  x     / x     / 179 U/L / x     / 9.2 ng/mL  CARDIAC MARKERS ( 2019 23:25 )  x     / x     / 119 U/L / x     / 6.1 ng/mL  CARDIAC MARKERS ( 2019 08:34 )  x     / x     / 99 U/L / x     / 2.4 ng/mL    ECG:     Echo:    Cath:    Imaging: Chest XR clear    Interpretation of Telemetry: AF 98 HPI:  Patient with a history of CAD with past PCI, chronic atrial fibrillation on Coumadin (patient was told by his office physician above to stop his Coumadin and go to the ER), essential HTN, severe AS, reported unspecified hemoccult positive stools in the office, type 2 DM on metformin, with the patient self referring to [place] directed by his office physician following an elevated INR and episode of epistaxis this AM following apparently 3 months of progressive dyspnoea and B/L LE oedema with poor aerobic functional status with dyspnoea with changing his clothes or ambulation to the BR (15 Lazaro 2019 23:54). During hospitalization pt was being work-up for a TAVR. n 1.26 pt has altered mental status w/ hypotension requiring pressors and tx to CCU for septic shock w/ MRSA bacteremia suspected aspiration event and found t be positive MRSA bacteremia c/b PEA arrest w/ ROSC following 1 minute.    Events: No acute events overnight. 250 blus given for CVP 6.     Review Of Systems:  Pt intubated and sedated.     Medications:  acetaminophen   Tablet .. 650 milliGRAM(s) Oral every 6 hours PRN  ALBUTerol/ipratropium for Nebulization 3 milliLiter(s) Nebulizer every 6 hours  artificial tears (preservative free) Ophthalmic Solution 1 Drop(s) Both EYES every 6 hours  atorvastatin 40 milliGRAM(s) Oral at bedtime  chlorhexidine 4% Liquid 1 Application(s) Topical <User Schedule>  clopidogrel Tablet 75 milliGRAM(s) Oral daily  dexmedetomidine Infusion 0.05 MICROgram(s)/kG/Hr IV Continuous <Continuous>  dextrose 40% Gel 15 Gram(s) Oral once PRN  dextrose 5%. 1000 milliLiter(s) IV Continuous <Continuous>  dextrose 50% Injectable 12.5 Gram(s) IV Push once  dextrose 50% Injectable 25 Gram(s) IV Push once  dextrose 50% Injectable 25 Gram(s) IV Push once  finasteride 5 milliGRAM(s) Oral daily  glucagon  Injectable 1 milliGRAM(s) IntraMuscular once PRN  insulin glargine Injectable (LANTUS) 9 Unit(s) SubCutaneous at bedtime  insulin lispro (HumaLOG) corrective regimen sliding scale   SubCutaneous every 6 hours  norepinephrine Infusion 0.5 MICROgram(s)/kG/Min IV Continuous <Continuous>  nystatin Powder 1 Application(s) Topical two times a day  pantoprazole  Injectable 40 milliGRAM(s) IV Push daily  petrolatum Ophthalmic Ointment 1 Application(s) Both EYES every 8 hours  phenylephrine    Infusion 1.5 MICROgram(s)/kG/Min IV Continuous <Continuous>  piperacillin/tazobactam IVPB. 3.375 Gram(s) IV Intermittent every 12 hours  vancomycin  IVPB 1000 milliGRAM(s) IV Intermittent daily  vasopressin Infusion 0.04 Unit(s)/Min IV Continuous <Continuous>    Vitals:  T(C): 37.2 (19 @ 22:00), Max: 39 (19 @ 08:00)  HR: 98 (19 @ 07:20) (56 - 110)  BP: 103/77 (19 @ 00:15) (61/44 - 105/64)  BP(mean): 86 (19 @ 00:15) (49 - 86)  RR: 24 (19 @ 06:15) (12 - 36)  SpO2: 98% (19 @ 07:20) (70% - 100%)    Daily     Daily Weight in k.5 (2019 05:15)  I&O's Summary    2019 07:01  -  2019 07:00  --------------------------------------------------------  IN: 2611.7 mL / OUT: 325 mL / NET: 2286.7 mL    Physical Exam:  Appearance: sedated  Eyes: [ x] PERRL [ x] EOMI  HENT: [x ] Normal oral muscosa [x ]NC/AT  Cardiovascular: [x ] S1 [ x] S2 irregular rate.  no edema. + murmurs  Respiratory: [x ] Clear to auscultation bilaterally  Gastrointestinal: [x ] Soft [ x] Non-tender [ x] Non-distended   Musculoskeletal: [x] No clubbing [x ] No joint deformity   Neurologic: [x ] Non-focal  Lymphatic: [x ] No lymphadenopathy  Psychiatry: [ x] AAOx3 [ x] Mood & affect appropriate  Skin: [x ] No rashes [x ] No ecchymoses [x ] No cyanosis    01-27    144  |  108  |  102<H>  ----------------------------<  99  4.7   |  18<L>  |  2.93<H>    Ca    8.9      2019 04:08  Phos  7.1       Mg     2.2         TPro  5.7<L>  /  Alb  1.9<L>  /  TBili  5.1<H>  /  DBili  x   /  AST  103<H>  /  ALT  43  /  AlkPhos  176<H>      PT/INR - ( 2019 04:08 )   PT: 33.9 sec;   INR: 2.85 ratio         PTT - ( 2019 04:08 )  PTT:53.1 sec  CARDIAC MARKERS ( 2019 04:08 )  x     / x     / 179 U/L / x     / 9.2 ng/mL  CARDIAC MARKERS ( 2019 23:25 )  x     / x     / 119 U/L / x     / 6.1 ng/mL  CARDIAC MARKERS ( 2019 08:34 )  x     / x     / 99 U/L / x     / 2.4 ng/mL    ECG:     Echo: < from: Transthoracic Echocardiogram (19 @ 14:19) >  1. Mitral annular calcification. Thickened and tethered  mitral valve leaflets. Moderate mitral regurgitation.  2. Calcified aortic valve with decreased opening.  Morphology is not well seen. Cannot rule out bicuspid  valve. Peak transaortic valve gradient equals 71 mm Hg,  mean transaortic valve gradient equals 38 mm Hg, estimated  aortic valve area equals 0.8 sqcm (by continuity equation),  aortic valve velocity time integral equals 99 cm,  consistent with severe aortic stenosis. Mild aortic  regurgitation.  3. Severely dilated left atrium.  LA volume index = 57  cc/m2.  4. Eccentric left ventricular hypertrophy (dilated left  ventricle with normal relative wall thickness).  5. Mild global left ventricular systolic dysfunction.  Flattening of the interventricular septum in both systole  and diastole is  consistent with right ventricular pressure  overload.  6. Right ventricular enlargement with decreased right  ventricular systolic function.  7. Normal tricuspid valve. Moderate-severe tricuspid  regurgitation.  8. Estimated pulmonary artery systolic pressure equals 71  mm Hg, assuming right atrial pressure equals 10 mm Hg,  consistent with severe pulmonary pressures.    Cath: < from: Cardiac Cath Lab - Adult (17 @ 12:57) >  CORONARY VESSELS: The coronary circulation is right dominant.  LM:   --  LM: Angiography showed minor luminal irregularities with no flow  limiting lesions.  LAD:   --  Proximal LAD: There was a discrete 40 % stenosis at the site of  a prior stent, in-stent.  --  Mid LAD: There was a 70 % stenosis.  --  D2: There was a 70 % stenosis.  CX:   --  Circumflex: Angiography showed minor luminal irregularities with  no flow limiting lesions.  --  OM1: Angiography showed minor luminal irregularities with no flow  limiting lesions.  RCA:   --  Proximal RCA: There was a 40 % stenosis. The lesion was  irregularly contoured.  --  RPDA: There was a 100 % stenosis. There was good collateral blood  supply to the distal myocardium.    Imaging: Chest XR clear    Interpretation of Telemetry: AF 98

## 2019-01-27 NOTE — PROGRESS NOTE ADULT - PROBLEM SELECTOR PLAN 2
- Patients renal injury was initially improving with diuresis and likely had nephrosarca. Now patient likely has multifactorial etiology occurring with septic/cardiogenic shock in setting of RV failure and severe to critical AS or likely a combination of both leading to renal hypoperfusion which is also leading to encephalopathy.   - Creatinine 2.9 from 2.5 with UO 10 cc/hour. 250 cc NS bolus given.

## 2019-01-27 NOTE — PROGRESS NOTE ADULT - PROBLEM SELECTOR PROBLEM 7
Chronic atrial fibrillation
Hypernatremia
Hypernatremia
Type 2 diabetes mellitus with other diabetic kidney complication, without long-term current use of insulin
Hypernatremia
Chronic atrial fibrillation
Hypernatremia

## 2019-01-27 NOTE — PROGRESS NOTE ADULT - PROBLEM SELECTOR PLAN 6
Lantus 9 units PM  Sliding scale   FS acceptable
Likely component of patient's anasarca>>US with hydrocele.  will elevate and monitor on diuresis.
Likely component of patient's anasarca>>US with hydrocele.  will elevate and monitor on diuresis.   Will provide Mycostatin powder for now for suspected tinea cruris.
Likely component of patient's anasarca>>will obtain scrotal US.    Will provide Mycostatin powder for now for suspected tinea cruris.
Stable for now  Will HOLD ACE and metformin and will follow Cr on Lasix as above.    Monitor strict I+Os, wg.   Renal US without obstruction.  Decreased Lasix
Supratherapeutic INR, hold coumadin  rate controlled.
- Continue lantus

## 2019-01-27 NOTE — PROGRESS NOTE ADULT - PROBLEM SELECTOR PLAN 8
40 meq of K   3 runs of K  Repeat BMP @ 6pm
Supratherapeutic INR, hold coumadin, hold off on vitamin K supplementation for now   rate controlled.
- Continue Levo and yoselyn and vaso  - gentle hydration prn to maintain CVP > 8

## 2019-01-27 NOTE — CHART NOTE - NSCHARTNOTEFT_GEN_A_CORE
Fellow Note:    78 M with PMH of CHF, CAD s/p DAYANA to mLAD and POBA to D2 5/2017, AFib on home warfarin, T2DM, HLD, HTN who presented from home with worsening PEREZ and LE edema x3 months. TTE revealed EF 45%, severe AS, mild global LV dysfunction and severe pHTN. Undergoing workup and optimization for TAVR. Now with episode of worsening hypotension, AMS, fever; possibly in context of sepsis vs over diuresis. Pt's mental status was slowly improving throughout the day. Continued on levophed. S/p A-line, central line  Overnight, pt dropped BP from 100s to 50s, stopped responding. Bradycardic, lost pulse. Gave atropine, epi, 1 round of CPR, intubated for airway protection. Event likely 2/2 aspiration (thick dark mucous secretions during intubation). BCX positive for gram positive cocci. CVP 4. Central venous sat 89  -Na slightly elevated- will give cautious fluids, follow up urine studies  -Continue broad spectrum abx  -Continue vaso, levo, yoselyn- wean as tolerated  -Follow up TTE   -Follow up wound care  -Follow up CT head  -Consult ID  -PPI, TF once NGT in  -Trend Cr, uop, follow up renal recs  -Continue lantus, TAMIKO, trend FSGs    Vinayak Quach MD PGY4

## 2019-01-27 NOTE — CHART NOTE - NSCHARTNOTEFT_GEN_A_CORE
Pt became acutely hypotensive 50s systolic & unresponsive. O2 sat 91% (baseline). Patient then bradycardic. @ 2307 PEA arrest. S/p ACLS w/ ROSC after 1 epi & 1 mg . Unable to protect airway, intubated by anesthesia. Cards fellow Dr. Quach @ bedside. NP Violette Servin spoke w/ family (brother out of state). Hypotensive, levo increased, vaso added, yoselyn added given severe AS. RIJ TLC placed, central venous sat noted to be 82, CVP 5-6. Small fluid bolus ordered.     Naima Ruiz [facility]-BC/CCU  spectra #[phone]  beeper #[phone] Pt became acutely hypotensive 50s systolic & unresponsive. O2 sat 91% (baseline). Patient then bradycardic. @ 2307 PEA arrest. S/p ACLS w/ ROSC after 1 epi & 1 mg . Unable to protect airway, intubated by anesthesia, likely aspirated as copious thick, dark material suctioned by anesthesia. Cards fellow Dr. Quach @ bedside. NP Violette Servin spoke w/ family (brother out of state). Hypotensive, levo increased, vaso added, yoselyn added given severe AS. RIJ TLC placed, central venous sat noted to be 82, CVP 5-6. Small fluid bolus ordered.     Naima Ruiz [facility]/CCU  spectra #[phone]  beeper #[phone]

## 2019-01-27 NOTE — PROGRESS NOTE ADULT - SUBJECTIVE AND OBJECTIVE BOX
====================  CCU MIDNIGHT ROUNDS  ====================    JOSUÉ RAMIREZ  [id number]  Patient is a 78y old  Male who presents with a chief complaint of 3 months of progressive B/L LE oedema and dyspnoea on exertion. (27 Jan 2019 07:48)      ====================  SUMMARY:  ====================      ====================  NEW EVENTS:  ====================    MEDICATIONS  (STANDING):  ALBUTerol/ipratropium for Nebulization 3 milliLiter(s) Nebulizer every 6 hours  artificial tears (preservative free) Ophthalmic Solution 1 Drop(s) Both EYES every 6 hours  atorvastatin 40 milliGRAM(s) Oral at bedtime  chlorhexidine 4% Liquid 1 Application(s) Topical <User Schedule>  clopidogrel Tablet 75 milliGRAM(s) Oral daily  dextrose 5%. 1000 milliLiter(s) (50 mL/Hr) IV Continuous <Continuous>  dextrose 50% Injectable 12.5 Gram(s) IV Push once  dextrose 50% Injectable 25 Gram(s) IV Push once  dextrose 50% Injectable 25 Gram(s) IV Push once  finasteride 5 milliGRAM(s) Oral daily  insulin glargine Injectable (LANTUS) 9 Unit(s) SubCutaneous at bedtime  insulin lispro (HumaLOG) corrective regimen sliding scale   SubCutaneous every 6 hours  midazolam Infusion 0.02 mG/kG/Hr (1.928 mL/Hr) IV Continuous <Continuous>  norepinephrine Infusion 1 MICROgram(s)/kG/Min (90.375 mL/Hr) IV Continuous <Continuous>  nystatin Powder 1 Application(s) Topical two times a day  pantoprazole  Injectable 40 milliGRAM(s) IV Push daily  petrolatum Ophthalmic Ointment 1 Application(s) Both EYES every 8 hours  phenylephrine    Infusion 1.5 MICROgram(s)/kG/Min (27.113 mL/Hr) IV Continuous <Continuous>  piperacillin/tazobactam IVPB. 3.375 Gram(s) IV Intermittent every 12 hours  vasopressin Infusion 0.04 Unit(s)/Min (2.4 mL/Hr) IV Continuous <Continuous>    MEDICATIONS  (PRN):  acetaminophen    Suspension .. 650 milliGRAM(s) Enteral Tube every 6 hours PRN Temp greater or equal to 38C (100.4F)  acetaminophen   Tablet .. 650 milliGRAM(s) Oral every 6 hours PRN Moderate Pain (4 - 6)  dextrose 40% Gel 15 Gram(s) Oral once PRN Blood Glucose LESS THAN 70 milliGRAM(s)/deciliter  glucagon  Injectable 1 milliGRAM(s) IntraMuscular once PRN Glucose LESS THAN 70 milligrams/deciliter      ====================  VITALS (Last 12 hrs):  ====================    T(C): 38.6 (01-27-19 @ 19:30), Max: 38.8 (01-27-19 @ 14:00)  T(F): 101.4 (01-27-19 @ 19:30), Max: 101.9 (01-27-19 @ 14:00)  HR: 98 (01-27-19 @ 20:30) (88 - 102)  BP: 87/59 (01-27-19 @ 14:15) (87/59 - 87/59)  BP(mean): 67 (01-27-19 @ 14:15) (67 - 67)  ABP: 82/48 (01-27-19 @ 20:30) (70/38 - 96/48)  ABP(mean): 60 (01-27-19 @ 20:30) (50 - 66)  RR: 23 (01-27-19 @ 20:30) (20 - 27)  SpO2: 97% (01-27-19 @ 20:30) (94% - 100%)  CVP(mm Hg): 11 (01-27-19 @ 20:30) (2 - 12)      I&O's Summary    26 Jan 2019 07:01  -  27 Jan 2019 07:00  --------------------------------------------------------  IN: 2810.8 mL / OUT: 325 mL / NET: 2485.8 mL    27 Jan 2019 07:01  -  27 Jan 2019 21:09  --------------------------------------------------------  IN: 2763 mL / OUT: 35 mL / NET: 2728 mL        Mode: AC/ CMV (Assist Control/ Continuous Mandatory Ventilation)  RR (machine): 22  TV (machine): 520  FiO2: 50  PEEP: 5  ITime: 1  MAP: 12  PIP: 19      ====================  NEW LABS:  ====================      01-27    142  |  111<H>  |  104<H>  ----------------------------<  90  5.4<H>   |  15<L>  |  3.11<H>    Ca    8.0<L>      27 Jan 2019 18:09  Phos  7.4     01-27  Mg     2.3     01-27    TPro  5.4<L>  /  Alb  1.6<L>  /  TBili  6.0<H>  /  DBili  x   /  AST  94<H>  /  ALT  42  /  AlkPhos  159<H>  01-27    PT/INR - ( 27 Jan 2019 04:08 )   PT: 33.9 sec;   INR: 2.85 ratio         PTT - ( 27 Jan 2019 04:08 )  PTT:53.1 sec  Creatine Kinase, Serum: 150 U/L (01-27-19 @ 10:32)  Creatine Kinase, Serum: 179 U/L (01-27-19 @ 04:08)  Creatine Kinase, Serum: 119 U/L (01-26-19 @ 23:25)    CKMB Units: 7.2 ng/mL (01-27 @ 10:32)  CKMB Units: 9.2 ng/mL (01-27 @ 04:08)  CKMB Units: 6.1 ng/mL (01-26 @ 23:25)    ABG - ( 27 Jan 2019 10:13 )  pH, Arterial: 7.31  pH, Blood: x     /  pCO2: 36    /  pO2: 88    / HCO3: 18    / Base Excess: -7.5  /  SaO2: 96          ====================  PLAN:  ====================  -       Violette Servin [facility] NP  Beeper #[id number]  Spectra # [phone] ====================  CCU MIDNIGHT ROUNDS  ====================    JOSUÉ RAMIREZ  [id number]  Patient is a 78y old  Male who presents with a chief complaint of 3 months of progressive B/L LE oedema and dyspnoea on exertion. (27 Jan 2019 07:48)      ====================  SUMMARY: 79 y/o male with PMH of HTN, CHF, CAD s/p PCI, AFib on coumadin, DM (on metformin) and severe AS presented to his PCP with progressive dyspnea and bilateral LE edema for 3 months. Pt was admitted for acute decompensated heart failure and treated with Lasix.  Hospital course complicated by acute hypoxic respiratory failure requiring hiFlo Pt responded to diuresis and was transitioned from gtt to lasix TID. Pt also being followed by structural heart for AVR.  On 1/26, RRT was called on the floor when patient was found to be hypotensive, unarousable to sternal rub and fever. Pt placed on NRB, started on levophed gtt transferred to CCU for further management. In CCU, on 1/26 pt became hypotensive, bradycardic with AMS and then went into PEA arrest ROSC after 1 round ACS, epi x1 and atropine x1 given. Pt was intubated and required vasopressin, levo and yoselyn gtt.   ====================      ====================  NEW EVENTS: no acute issues overnight. Pt remains intubated with PRVC mode  ====================    MEDICATIONS  (STANDING):  ALBUTerol/ipratropium for Nebulization 3 milliLiter(s) Nebulizer every 6 hours  artificial tears (preservative free) Ophthalmic Solution 1 Drop(s) Both EYES every 6 hours  atorvastatin 40 milliGRAM(s) Oral at bedtime  chlorhexidine 4% Liquid 1 Application(s) Topical <User Schedule>  clopidogrel Tablet 75 milliGRAM(s) Oral daily  dextrose 5%. 1000 milliLiter(s) (50 mL/Hr) IV Continuous <Continuous>  dextrose 50% Injectable 12.5 Gram(s) IV Push once  dextrose 50% Injectable 25 Gram(s) IV Push once  dextrose 50% Injectable 25 Gram(s) IV Push once  finasteride 5 milliGRAM(s) Oral daily  insulin glargine Injectable (LANTUS) 9 Unit(s) SubCutaneous at bedtime  insulin lispro (HumaLOG) corrective regimen sliding scale   SubCutaneous every 6 hours  midazolam Infusion 0.02 mG/kG/Hr (1.928 mL/Hr) IV Continuous <Continuous>  norepinephrine Infusion 1 MICROgram(s)/kG/Min (90.375 mL/Hr) IV Continuous <Continuous>  nystatin Powder 1 Application(s) Topical two times a day  pantoprazole  Injectable 40 milliGRAM(s) IV Push daily  petrolatum Ophthalmic Ointment 1 Application(s) Both EYES every 8 hours  phenylephrine    Infusion 1.5 MICROgram(s)/kG/Min (27.113 mL/Hr) IV Continuous <Continuous>  piperacillin/tazobactam IVPB. 3.375 Gram(s) IV Intermittent every 12 hours  vasopressin Infusion 0.04 Unit(s)/Min (2.4 mL/Hr) IV Continuous <Continuous>    MEDICATIONS  (PRN):  acetaminophen    Suspension .. 650 milliGRAM(s) Enteral Tube every 6 hours PRN Temp greater or equal to 38C (100.4F)  acetaminophen   Tablet .. 650 milliGRAM(s) Oral every 6 hours PRN Moderate Pain (4 - 6)  dextrose 40% Gel 15 Gram(s) Oral once PRN Blood Glucose LESS THAN 70 milliGRAM(s)/deciliter  glucagon  Injectable 1 milliGRAM(s) IntraMuscular once PRN Glucose LESS THAN 70 milligrams/deciliter      ====================  VITALS (Last 12 hrs):  ====================    T(C): 38.6 (01-27-19 @ 19:30), Max: 38.8 (01-27-19 @ 14:00)  T(F): 101.4 (01-27-19 @ 19:30), Max: 101.9 (01-27-19 @ 14:00)  HR: 98 (01-27-19 @ 20:30) (88 - 102)  BP: 87/59 (01-27-19 @ 14:15) (87/59 - 87/59)  BP(mean): 67 (01-27-19 @ 14:15) (67 - 67)  ABP: 82/48 (01-27-19 @ 20:30) (70/38 - 96/48)  ABP(mean): 60 (01-27-19 @ 20:30) (50 - 66)  RR: 23 (01-27-19 @ 20:30) (20 - 27)  SpO2: 97% (01-27-19 @ 20:30) (94% - 100%)  CVP(mm Hg): 11 (01-27-19 @ 20:30) (2 - 12)      I&O's Summary    26 Jan 2019 07:01  -  27 Jan 2019 07:00  --------------------------------------------------------  IN: 2810.8 mL / OUT: 325 mL / NET: 2485.8 mL    27 Jan 2019 07:01  -  27 Jan 2019 21:09  --------------------------------------------------------  IN: 2763 mL / OUT: 35 mL / NET: 2728 mL        Mode: AC/ CMV (Assist Control/ Continuous Mandatory Ventilation)  RR (machine): 22  TV (machine): 520  FiO2: 50  PEEP: 5  ITime: 1  MAP: 12  PIP: 19      ====================  NEW LABS:  ====================      01-27    142  |  111<H>  |  104<H>  ----------------------------<  90  5.4<H>   |  15<L>  |  3.11<H>    Ca    8.0<L>      27 Jan 2019 18:09  Phos  7.4     01-27  Mg     2.3     01-27    TPro  5.4<L>  /  Alb  1.6<L>  /  TBili  6.0<H>  /  DBili  x   /  AST  94<H>  /  ALT  42  /  AlkPhos  159<H>  01-27    PT/INR - ( 27 Jan 2019 04:08 )   PT: 33.9 sec;   INR: 2.85 ratio         PTT - ( 27 Jan 2019 04:08 )  PTT:53.1 sec  Creatine Kinase, Serum: 150 U/L (01-27-19 @ 10:32)  Creatine Kinase, Serum: 179 U/L (01-27-19 @ 04:08)  Creatine Kinase, Serum: 119 U/L (01-26-19 @ 23:25)    CKMB Units: 7.2 ng/mL (01-27 @ 10:32)  CKMB Units: 9.2 ng/mL (01-27 @ 04:08)  CKMB Units: 6.1 ng/mL (01-26 @ 23:25)    ABG - ( 27 Jan 2019 10:13 )  pH, Arterial: 7.31  pH, Blood: x     /  pCO2: 36    /  pO2: 88    / HCO3: 18    / Base Excess: -7.5  /  SaO2: 96          ====================  PLAN:  ====================  #Neuro  Sedation  -Started on versed gtt in AM  -AM sedation vacation  -planned Head CT when pt is stable    #Pulmo  Acute respiratory failure  -ETT to MV, PRVC mode 520/5/22/50%, lip line readjusted to 25cm  -Wean as tolerated in AM  -Trend ABG, CXR in AM    #Cardiac  s/p cardiac arrest   -Titrate levo, vasopressin, and yoselyn gtt as tolerated  -TTE in the AM    Acute decompensated HF  -Holding lasix due to hypotension  -Strict I+O, daily weights    CAD  -Continue DAPT, statin    AFib  -Continue heparin gtt per protocol    Severe AS  -Fluids PRN, CVP goal 6-8  -Monitor UOP  -f/u structural heart for TAVR eval    #Renal   ELADIA   -Trend SCr, holding diuretics  -Monitor urine output  -Dose meds renally, avoid nephrotoxic meds  -possible dialysis within 24-48 as per renal    #Endo  -Continue lantus 9 units  -ISS     #ID   Sepsis  -(+) blood cultures on 1/27 with gram positive cocci in clusters  -Daily Blood cultures  -Continue zosyn 3.375 (started 1/26)  -Continue vanco 1g (started 1/26), dose by level  -Consult ID      Violette Servin CCU NP  Beeper #[id number]  Spectra # [phone]

## 2019-01-27 NOTE — PROGRESS NOTE ADULT - PROBLEM SELECTOR PLAN 5
Improving with diuresis  Will HOLD ACE and metformin and will follow Cr on Lasix as above.    Monitor strict I+Os, wg.   Renal US without obstruction.
Can cont coumadin at lower dose. (home 4.5 mg but high INR on admission)  Suspect related to noncompliance with dietary  rate controlled.
Can cont coumadin at lower dose. (home 4.5 mg but high INR on admission)  Suspect related to noncompliance with dietary  rate controlled.
INR decreased to 3s after vitamin K 2.5 mg yesterday.   will hold coumadin for now.   Suspect related to noncompliance with dietary
INR now 2s. will resume coumadin at lower dose. (2 mg)  Supratherapeutic on admission    Suspect related to noncompliance with dietary
Much improved.  Once more euvolemic will restart coreg.
Reportedly was on 4.5 mg coumadin at home but supratherapeutic on admission.  Even with 2mg dose patient with high INR> will hold today dose. check INR  Suspect related to noncompliance with dietary  rate controlled.
Will give low dose vit K given some epistaxis with increased INR.  Suspect related to noncompliance with dietary   Hold coumadin.
Lantus 9 units PM  Sliding scale   FS acceptable
- Does not appear overloaded. Hold diuretics

## 2019-01-27 NOTE — PROGRESS NOTE ADULT - PROBLEM SELECTOR PLAN 7
Supratherapeutic INR, hold coumadin, hold off on vitamin K supplementation for now   rate controlled.
Lantus 9 units PM  Sliding scale   FS acceptable
resolved. hyypokalemia on lasix gtt  monitor Na, K on lasix gtt
resolved. hyypokalemia on lasix gtt  monitor Na,mg K on lasix gtt
hyypokalemia on lasix gtt  replete K. monitor Na
resolved. hyypokalemia on lasix gtt  monitor Na,mg K on lasix gtt
- Monitor INR and start heparin when INR < 2

## 2019-01-27 NOTE — PROGRESS NOTE ADULT - PROBLEM SELECTOR PROBLEM 4
Bradycardia
HTN (hypertension)
Severe aortic stenosis
Type 2 diabetes mellitus with other diabetic kidney complication, without long-term current use of insulin
ELADIA (acute kidney injury)
Severe aortic stenosis

## 2019-01-27 NOTE — PROGRESS NOTE ADULT - ATTENDING COMMENTS
Seen/examined at bedside. Agree with above. Tx for CCU yesterday for hypotension  Overnight, with an episode of PEA arrest, and presumable Aspiration. Blood cultures are positive  severe AS and acute on chronic systolic heart failure (likely underlying chronic ICM with CAD and prior PCI).  Septic shock likely the etiology of hypotension and fever, with underlying critical valve disease and CHF  hold diuresis. Continue with Vasopressors to maintain MAP >60  Abx  trend lactate and creatinine  coumadin on hold for supratherapeutic inr  Have attempted to contact family regarding goals of care.    High risk of decompensation. Critically ill on multiple pressors with poor prognosis. >35 min spent taking care of patient.

## 2019-01-27 NOTE — PROGRESS NOTE ADULT - PROBLEM SELECTOR PLAN 4
Patient now hypotensive in the setting of likely infectious process and heart failure/AS. Continue to monitor, agree with Levophed however would also start giving small volume boluses to see how pressure is affected keeping close eye on lung exam. Phenylephrine for AS and sepsis. Maintain MAP > 65-70 to try to avoid further hypotensive renal damage. resolved now in shock

## 2019-01-27 NOTE — PROGRESS NOTE ADULT - PROBLEM SELECTOR PROBLEM 6
Type 2 diabetes mellitus with other diabetic kidney complication, without long-term current use of insulin
ELADIA (acute kidney injury)
Scrotal edema
Chronic atrial fibrillation
Type 2 diabetes mellitus with other diabetic kidney complication, without long-term current use of insulin

## 2019-01-27 NOTE — AIRWAY PLACEMENT NOTE ADULT - POST AIRWAY PLACEMENT ASSESSMENT:
CXR pending/breath sounds bilateral/positive end tidal CO2 noted/skin color improved/breath sounds equal/chest excursion noted

## 2019-01-28 LAB
-  AMPICILLIN/SULBACTAM: SIGNIFICANT CHANGE UP
-  CEFAZOLIN: SIGNIFICANT CHANGE UP
-  CLINDAMYCIN: SIGNIFICANT CHANGE UP
-  DAPTOMYCIN: SIGNIFICANT CHANGE UP
-  ERYTHROMYCIN: SIGNIFICANT CHANGE UP
-  GENTAMICIN: SIGNIFICANT CHANGE UP
-  LINEZOLID: SIGNIFICANT CHANGE UP
-  OXACILLIN: SIGNIFICANT CHANGE UP
-  PENICILLIN: SIGNIFICANT CHANGE UP
-  RIFAMPIN: SIGNIFICANT CHANGE UP
-  TETRACYCLINE: SIGNIFICANT CHANGE UP
-  TRIMETHOPRIM/SULFAMETHOXAZOLE: SIGNIFICANT CHANGE UP
-  VANCOMYCIN: SIGNIFICANT CHANGE UP
ACANTHOCYTES BLD QL SMEAR: SIGNIFICANT CHANGE UP
ALBUMIN SERPL ELPH-MCNC: 1.2 G/DL — LOW (ref 3.3–5)
ALBUMIN SERPL ELPH-MCNC: 1.5 G/DL — LOW (ref 3.3–5)
ALP SERPL-CCNC: 144 U/L — HIGH (ref 40–120)
ALP SERPL-CCNC: 157 U/L — HIGH (ref 40–120)
ALT FLD-CCNC: 38 U/L — SIGNIFICANT CHANGE UP (ref 10–45)
ALT FLD-CCNC: 40 U/L — SIGNIFICANT CHANGE UP (ref 10–45)
ANION GAP SERPL CALC-SCNC: 15 MMOL/L — SIGNIFICANT CHANGE UP (ref 5–17)
ANION GAP SERPL CALC-SCNC: 16 MMOL/L — SIGNIFICANT CHANGE UP (ref 5–17)
ANISOCYTOSIS BLD QL: SIGNIFICANT CHANGE UP
APTT BLD: 71.9 SEC — HIGH (ref 27.5–36.3)
AST SERPL-CCNC: 108 U/L — HIGH (ref 10–40)
AST SERPL-CCNC: 113 U/L — HIGH (ref 10–40)
BASOPHILS # BLD AUTO: 0 K/UL — SIGNIFICANT CHANGE UP (ref 0–0.2)
BASOPHILS NFR BLD AUTO: 1 % — SIGNIFICANT CHANGE UP (ref 0–2)
BILIRUB SERPL-MCNC: 6.4 MG/DL — HIGH (ref 0.2–1.2)
BILIRUB SERPL-MCNC: 6.6 MG/DL — HIGH (ref 0.2–1.2)
BUN SERPL-MCNC: 106 MG/DL — HIGH (ref 7–23)
BUN SERPL-MCNC: 111 MG/DL — HIGH (ref 7–23)
BURR CELLS BLD QL SMEAR: PRESENT — SIGNIFICANT CHANGE UP
BURR CELLS BLD QL SMEAR: SLIGHT — SIGNIFICANT CHANGE UP
CALCIUM SERPL-MCNC: 7.7 MG/DL — LOW (ref 8.4–10.5)
CALCIUM SERPL-MCNC: 7.9 MG/DL — LOW (ref 8.4–10.5)
CHLORIDE SERPL-SCNC: 113 MMOL/L — HIGH (ref 96–108)
CHLORIDE SERPL-SCNC: 113 MMOL/L — HIGH (ref 96–108)
CK MB CFR SERPL CALC: 5.4 NG/ML — SIGNIFICANT CHANGE UP (ref 0–6.7)
CK SERPL-CCNC: 161 U/L — SIGNIFICANT CHANGE UP (ref 30–200)
CO2 SERPL-SCNC: 14 MMOL/L — LOW (ref 22–31)
CO2 SERPL-SCNC: 15 MMOL/L — LOW (ref 22–31)
CREAT SERPL-MCNC: 3.24 MG/DL — HIGH (ref 0.5–1.3)
CREAT SERPL-MCNC: 3.41 MG/DL — HIGH (ref 0.5–1.3)
CULTURE RESULTS: SIGNIFICANT CHANGE UP
DACRYOCYTES BLD QL SMEAR: SLIGHT — SIGNIFICANT CHANGE UP
ELLIPTOCYTES BLD QL SMEAR: SLIGHT — SIGNIFICANT CHANGE UP
EOSINOPHIL # BLD AUTO: 0.1 K/UL — SIGNIFICANT CHANGE UP (ref 0–0.5)
GAS PNL BLDA: SIGNIFICANT CHANGE UP
GLUCOSE BLDC GLUCOMTR-MCNC: 108 MG/DL — HIGH (ref 70–99)
GLUCOSE BLDC GLUCOMTR-MCNC: 108 MG/DL — HIGH (ref 70–99)
GLUCOSE BLDC GLUCOMTR-MCNC: 119 MG/DL — HIGH (ref 70–99)
GLUCOSE BLDC GLUCOMTR-MCNC: 33 MG/DL — CRITICAL LOW (ref 70–99)
GLUCOSE BLDC GLUCOMTR-MCNC: 79 MG/DL — SIGNIFICANT CHANGE UP (ref 70–99)
GLUCOSE BLDC GLUCOMTR-MCNC: 88 MG/DL — SIGNIFICANT CHANGE UP (ref 70–99)
GLUCOSE BLDC GLUCOMTR-MCNC: 92 MG/DL — SIGNIFICANT CHANGE UP (ref 70–99)
GLUCOSE SERPL-MCNC: 119 MG/DL — HIGH (ref 70–99)
GLUCOSE SERPL-MCNC: 91 MG/DL — SIGNIFICANT CHANGE UP (ref 70–99)
GRAM STN FLD: SIGNIFICANT CHANGE UP
GRAM STN FLD: SIGNIFICANT CHANGE UP
HCT VFR BLD CALC: 28.6 % — LOW (ref 39–50)
HCT VFR BLD CALC: 30.8 % — LOW (ref 39–50)
HGB BLD-MCNC: 10.2 G/DL — LOW (ref 13–17)
HGB BLD-MCNC: 9.2 G/DL — LOW (ref 13–17)
HYPOCHROMIA BLD QL: SLIGHT — SIGNIFICANT CHANGE UP
INR BLD: 7.94 RATIO — CRITICAL HIGH (ref 0.88–1.16)
INR BLD: 9.47 RATIO — CRITICAL HIGH (ref 0.88–1.16)
LYMPHOCYTES # BLD AUTO: 0.8 K/UL — LOW (ref 1–3.3)
LYMPHOCYTES # BLD AUTO: 2 % — LOW (ref 13–44)
MACROCYTES BLD QL: SIGNIFICANT CHANGE UP
MAGNESIUM SERPL-MCNC: 2.3 MG/DL — SIGNIFICANT CHANGE UP (ref 1.6–2.6)
MCHC RBC-ENTMCNC: 29.1 PG — SIGNIFICANT CHANGE UP (ref 27–34)
MCHC RBC-ENTMCNC: 29.9 PG — SIGNIFICANT CHANGE UP (ref 27–34)
MCHC RBC-ENTMCNC: 32.2 GM/DL — SIGNIFICANT CHANGE UP (ref 32–36)
MCHC RBC-ENTMCNC: 33 GM/DL — SIGNIFICANT CHANGE UP (ref 32–36)
MCV RBC AUTO: 90.3 FL — SIGNIFICANT CHANGE UP (ref 80–100)
MCV RBC AUTO: 90.5 FL — SIGNIFICANT CHANGE UP (ref 80–100)
METHOD TYPE: SIGNIFICANT CHANGE UP
MICROCYTES BLD QL: SIGNIFICANT CHANGE UP
MONOCYTES # BLD AUTO: 0.7 K/UL — SIGNIFICANT CHANGE UP (ref 0–0.9)
MONOCYTES NFR BLD AUTO: 7 % — SIGNIFICANT CHANGE UP (ref 2–14)
NEUTROPHILS # BLD AUTO: 14.8 K/UL — HIGH (ref 1.8–7.4)
NEUTROPHILS NFR BLD AUTO: 72 % — SIGNIFICANT CHANGE UP (ref 43–77)
NEUTS BAND # BLD: 18 % — HIGH (ref 0–8)
ORGANISM # SPEC MICROSCOPIC CNT: SIGNIFICANT CHANGE UP
PHOSPHATE SERPL-MCNC: 7.5 MG/DL — HIGH (ref 2.5–4.5)
PLAT MORPH BLD: ABNORMAL
PLATELET # BLD AUTO: 245 K/UL — SIGNIFICANT CHANGE UP (ref 150–400)
PLATELET # BLD AUTO: 281 K/UL — SIGNIFICANT CHANGE UP (ref 150–400)
POIKILOCYTOSIS BLD QL AUTO: SIGNIFICANT CHANGE UP
POLYCHROMASIA BLD QL SMEAR: SLIGHT — SIGNIFICANT CHANGE UP
POTASSIUM SERPL-MCNC: 5.6 MMOL/L — HIGH (ref 3.5–5.3)
POTASSIUM SERPL-MCNC: 5.9 MMOL/L — HIGH (ref 3.5–5.3)
POTASSIUM SERPL-SCNC: 5.6 MMOL/L — HIGH (ref 3.5–5.3)
POTASSIUM SERPL-SCNC: 5.9 MMOL/L — HIGH (ref 3.5–5.3)
PROT SERPL-MCNC: 4.9 G/DL — LOW (ref 6–8.3)
PROT SERPL-MCNC: 5.2 G/DL — LOW (ref 6–8.3)
PROTHROM AB SERPL-ACNC: 116.8 SEC — HIGH (ref 10–12.9)
PROTHROM AB SERPL-ACNC: 96.5 SEC — HIGH (ref 10–12.9)
RBC # BLD: 3.16 M/UL — LOW (ref 4.2–5.8)
RBC # BLD: 3.4 M/UL — LOW (ref 4.2–5.8)
RBC # FLD: 25.5 % — HIGH (ref 10.3–14.5)
RBC # FLD: 26 % — HIGH (ref 10.3–14.5)
RBC BLD AUTO: ABNORMAL
SCHISTOCYTES BLD QL AUTO: SLIGHT — SIGNIFICANT CHANGE UP
SODIUM SERPL-SCNC: 143 MMOL/L — SIGNIFICANT CHANGE UP (ref 135–145)
SODIUM SERPL-SCNC: 143 MMOL/L — SIGNIFICANT CHANGE UP (ref 135–145)
SPECIMEN SOURCE: SIGNIFICANT CHANGE UP
TROPONIN T, HIGH SENSITIVITY RESULT: 214 NG/L — HIGH (ref 0–51)
VANCOMYCIN TROUGH SERPL-MCNC: 14.1 UG/ML — SIGNIFICANT CHANGE UP (ref 10–20)
WBC # BLD: 16.4 K/UL — HIGH (ref 3.8–10.5)
WBC # BLD: 16.5 K/UL — HIGH (ref 3.8–10.5)
WBC # FLD AUTO: 16.4 K/UL — HIGH (ref 3.8–10.5)
WBC # FLD AUTO: 16.5 K/UL — HIGH (ref 3.8–10.5)

## 2019-01-28 PROCEDURE — 70450 CT HEAD/BRAIN W/O DYE: CPT | Mod: 26

## 2019-01-28 PROCEDURE — 99291 CRITICAL CARE FIRST HOUR: CPT

## 2019-01-28 PROCEDURE — 99223 1ST HOSP IP/OBS HIGH 75: CPT | Mod: GC

## 2019-01-28 PROCEDURE — 99232 SBSQ HOSP IP/OBS MODERATE 35: CPT

## 2019-01-28 PROCEDURE — 71045 X-RAY EXAM CHEST 1 VIEW: CPT | Mod: 26

## 2019-01-28 RX ORDER — SODIUM POLYSTYRENE SULFONATE 4.1 MEQ/G
30 POWDER, FOR SUSPENSION ORAL ONCE
Qty: 0 | Refills: 0 | Status: COMPLETED | OUTPATIENT
Start: 2019-01-28 | End: 2019-01-28

## 2019-01-28 RX ORDER — SODIUM BICARBONATE 1 MEQ/ML
300 SYRINGE (ML) INTRAVENOUS ONCE
Qty: 0 | Refills: 0 | Status: DISCONTINUED | OUTPATIENT
Start: 2019-01-28 | End: 2019-01-28

## 2019-01-28 RX ORDER — DEXTROSE 50 % IN WATER 50 %
50 SYRINGE (ML) INTRAVENOUS ONCE
Qty: 0 | Refills: 0 | Status: COMPLETED | OUTPATIENT
Start: 2019-01-28 | End: 2019-01-28

## 2019-01-28 RX ORDER — SODIUM BICARBONATE 1 MEQ/ML
100 SYRINGE (ML) INTRAVENOUS ONCE
Qty: 0 | Refills: 0 | Status: COMPLETED | OUTPATIENT
Start: 2019-01-28 | End: 2019-01-28

## 2019-01-28 RX ORDER — CALCIUM CHLORIDE
500 POWDER (GRAM) MISCELLANEOUS ONCE
Qty: 0 | Refills: 0 | Status: COMPLETED | OUTPATIENT
Start: 2019-01-28 | End: 2019-01-28

## 2019-01-28 RX ORDER — MORPHINE SULFATE 50 MG/1
2 CAPSULE, EXTENDED RELEASE ORAL EVERY 4 HOURS
Qty: 0 | Refills: 0 | Status: DISCONTINUED | OUTPATIENT
Start: 2019-01-28 | End: 2019-01-29

## 2019-01-28 RX ORDER — EPINEPHRINE 0.3 MG/.3ML
0.05 INJECTION INTRAMUSCULAR; SUBCUTANEOUS
Qty: 4 | Refills: 0 | Status: DISCONTINUED | OUTPATIENT
Start: 2019-01-28 | End: 2019-01-29

## 2019-01-28 RX ORDER — PHYTONADIONE (VIT K1) 5 MG
10 TABLET ORAL ONCE
Qty: 0 | Refills: 0 | Status: COMPLETED | OUTPATIENT
Start: 2019-01-28 | End: 2019-01-28

## 2019-01-28 RX ORDER — NOREPINEPHRINE BITARTRATE/D5W 8 MG/250ML
0.05 PLASTIC BAG, INJECTION (ML) INTRAVENOUS
Qty: 32 | Refills: 0 | Status: DISCONTINUED | OUTPATIENT
Start: 2019-01-28 | End: 2019-01-29

## 2019-01-28 RX ORDER — SODIUM CHLORIDE 9 MG/ML
250 INJECTION INTRAMUSCULAR; INTRAVENOUS; SUBCUTANEOUS ONCE
Qty: 0 | Refills: 0 | Status: COMPLETED | OUTPATIENT
Start: 2019-01-28 | End: 2019-01-28

## 2019-01-28 RX ORDER — SODIUM BICARBONATE 1 MEQ/ML
50 SYRINGE (ML) INTRAVENOUS ONCE
Qty: 0 | Refills: 0 | Status: COMPLETED | OUTPATIENT
Start: 2019-01-28 | End: 2019-01-28

## 2019-01-28 RX ORDER — SODIUM BICARBONATE 1 MEQ/ML
0.2 SYRINGE (ML) INTRAVENOUS
Qty: 150 | Refills: 0 | Status: DISCONTINUED | OUTPATIENT
Start: 2019-01-28 | End: 2019-01-29

## 2019-01-28 RX ORDER — VANCOMYCIN HCL 1 G
1000 VIAL (EA) INTRAVENOUS ONCE
Qty: 0 | Refills: 0 | Status: COMPLETED | OUTPATIENT
Start: 2019-01-28 | End: 2019-01-28

## 2019-01-28 RX ORDER — INSULIN HUMAN 100 [IU]/ML
10 INJECTION, SOLUTION SUBCUTANEOUS ONCE
Qty: 0 | Refills: 0 | Status: COMPLETED | OUTPATIENT
Start: 2019-01-28 | End: 2019-01-28

## 2019-01-28 RX ADMIN — PIPERACILLIN AND TAZOBACTAM 25 GRAM(S): 4; .5 INJECTION, POWDER, LYOPHILIZED, FOR SOLUTION INTRAVENOUS at 05:26

## 2019-01-28 RX ADMIN — Medication 90.38 MICROGRAM(S)/KG/MIN: at 14:16

## 2019-01-28 RX ADMIN — Medication 3 MILLILITER(S): at 11:44

## 2019-01-28 RX ADMIN — Medication 90.38 MICROGRAM(S)/KG/MIN: at 05:31

## 2019-01-28 RX ADMIN — SODIUM POLYSTYRENE SULFONATE 30 GRAM(S): 4.1 POWDER, FOR SUSPENSION ORAL at 05:27

## 2019-01-28 RX ADMIN — Medication 102 MILLIGRAM(S): at 11:04

## 2019-01-28 RX ADMIN — Medication 1 DROP(S): at 23:00

## 2019-01-28 RX ADMIN — ATORVASTATIN CALCIUM 40 MILLIGRAM(S): 80 TABLET, FILM COATED ORAL at 23:00

## 2019-01-28 RX ADMIN — Medication 90.38 MICROGRAM(S)/KG/MIN: at 11:04

## 2019-01-28 RX ADMIN — Medication 500 MILLIGRAM(S): at 02:12

## 2019-01-28 RX ADMIN — MORPHINE SULFATE 2 MILLIGRAM(S): 50 CAPSULE, EXTENDED RELEASE ORAL at 23:51

## 2019-01-28 RX ADMIN — Medication 1 APPLICATION(S): at 05:25

## 2019-01-28 RX ADMIN — Medication 75 MEQ/KG/HR: at 16:04

## 2019-01-28 RX ADMIN — Medication 3 MILLILITER(S): at 01:01

## 2019-01-28 RX ADMIN — NYSTATIN CREAM 1 APPLICATION(S): 100000 CREAM TOPICAL at 05:25

## 2019-01-28 RX ADMIN — Medication 100 MILLIEQUIVALENT(S): at 18:45

## 2019-01-28 RX ADMIN — VASOPRESSIN 2.4 UNIT(S)/MIN: 20 INJECTION INTRAVENOUS at 11:05

## 2019-01-28 RX ADMIN — VASOPRESSIN 2.4 UNIT(S)/MIN: 20 INJECTION INTRAVENOUS at 23:01

## 2019-01-28 RX ADMIN — CHLORHEXIDINE GLUCONATE 1 APPLICATION(S): 213 SOLUTION TOPICAL at 05:24

## 2019-01-28 RX ADMIN — PIPERACILLIN AND TAZOBACTAM 25 GRAM(S): 4; .5 INJECTION, POWDER, LYOPHILIZED, FOR SOLUTION INTRAVENOUS at 17:57

## 2019-01-28 RX ADMIN — Medication 650 MILLIGRAM(S): at 01:48

## 2019-01-28 RX ADMIN — EPINEPHRINE 18.07 MICROGRAM(S)/KG/MIN: 0.3 INJECTION INTRAMUSCULAR; SUBCUTANEOUS at 20:22

## 2019-01-28 RX ADMIN — PHENYLEPHRINE HYDROCHLORIDE 27.11 MICROGRAM(S)/KG/MIN: 10 INJECTION INTRAVENOUS at 20:22

## 2019-01-28 RX ADMIN — Medication 1 APPLICATION(S): at 14:51

## 2019-01-28 RX ADMIN — Medication 1 DROP(S): at 12:17

## 2019-01-28 RX ADMIN — Medication 125 MEQ/KG/HR: at 23:02

## 2019-01-28 RX ADMIN — Medication 4.52 MICROGRAM(S)/KG/MIN: at 23:01

## 2019-01-28 RX ADMIN — NYSTATIN CREAM 1 APPLICATION(S): 100000 CREAM TOPICAL at 17:59

## 2019-01-28 RX ADMIN — Medication 50 MILLILITER(S): at 02:10

## 2019-01-28 RX ADMIN — Medication 50 MILLIEQUIVALENT(S): at 14:15

## 2019-01-28 RX ADMIN — PHENYLEPHRINE HYDROCHLORIDE 27.11 MICROGRAM(S)/KG/MIN: 10 INJECTION INTRAVENOUS at 05:31

## 2019-01-28 RX ADMIN — PANTOPRAZOLE SODIUM 40 MILLIGRAM(S): 20 TABLET, DELAYED RELEASE ORAL at 12:18

## 2019-01-28 RX ADMIN — FINASTERIDE 5 MILLIGRAM(S): 5 TABLET, FILM COATED ORAL at 12:18

## 2019-01-28 RX ADMIN — SODIUM CHLORIDE 250 MILLILITER(S): 9 INJECTION INTRAMUSCULAR; INTRAVENOUS; SUBCUTANEOUS at 02:12

## 2019-01-28 RX ADMIN — MORPHINE SULFATE 2 MILLIGRAM(S): 50 CAPSULE, EXTENDED RELEASE ORAL at 23:28

## 2019-01-28 RX ADMIN — INSULIN HUMAN 10 UNIT(S): 100 INJECTION, SOLUTION SUBCUTANEOUS at 02:10

## 2019-01-28 RX ADMIN — Medication 75 MEQ/KG/HR: at 18:47

## 2019-01-28 RX ADMIN — Medication 650 MILLIGRAM(S): at 01:18

## 2019-01-28 RX ADMIN — Medication 3 MILLILITER(S): at 17:14

## 2019-01-28 RX ADMIN — Medication 3 MILLILITER(S): at 07:22

## 2019-01-28 RX ADMIN — Medication 1 DROP(S): at 05:24

## 2019-01-28 RX ADMIN — Medication 1 APPLICATION(S): at 23:01

## 2019-01-28 RX ADMIN — Medication 90.38 MICROGRAM(S)/KG/MIN: at 18:23

## 2019-01-28 RX ADMIN — VASOPRESSIN 2.4 UNIT(S)/MIN: 20 INJECTION INTRAVENOUS at 05:31

## 2019-01-28 RX ADMIN — Medication 1 DROP(S): at 17:58

## 2019-01-28 RX ADMIN — PHENYLEPHRINE HYDROCHLORIDE 27.11 MICROGRAM(S)/KG/MIN: 10 INJECTION INTRAVENOUS at 11:05

## 2019-01-28 RX ADMIN — Medication 250 MILLIGRAM(S): at 00:10

## 2019-01-28 NOTE — PROVIDER CONTACT NOTE (CRITICAL VALUE NOTIFICATION) - BACKGROUND
78 year old male admitted with CHF
CHF, CAD, DM, HTN
Pt admitted for septic shock, currently intubated, sedated
heart failure
pt admit with CHF exacerbation, on lasix gtt at 5cc/h

## 2019-01-28 NOTE — PROGRESS NOTE ADULT - PROBLEM SELECTOR PLAN 1
now worsened due to shock state  Anuric now, K rising, will need CRRT today, 0k bath  please get temp HD shiley to start

## 2019-01-28 NOTE — PROVIDER CONTACT NOTE (CRITICAL VALUE NOTIFICATION) - NAME OF MD/NP/PA/DO NOTIFIED:
PRATIK Chung
Dannieombrenda PA
Jose Rodriguez
Katherine Ervin NP
NP MoneFoenedelia
PRATIK mares
Sonya Jolly NP
Umm Jolly, ANP

## 2019-01-28 NOTE — CONSULT NOTE ADULT - SUBJECTIVE AND OBJECTIVE BOX
HPI:  NIGHT HOSPITALIST:   Patient UNKNOWN to me previously, assigned to me at this point via the ER to admit this 77 y/o M--patient with a history of CAD with past PCI, chronic atrial fibrillation on Coumadin (patient was told by his office physician above to stop his Coumadin and go to the ER), essential HTN, unspecified cardiac valve disorder, reported unspecified hemoccult positive stools in the office, type 2 DM on metformin, with the patient self referring to [place] directed by his office physician following an elevated INR and episode of epistaxis this AM following apparently 3 months of progressive dyspnoea and B/L LE oedema with poor aerobic functional status with dyspnoea with changing his clothes or ambulation to the BR.   Patient denies chest pain/pressure.  NO dyspnoea at present.  NO HA< no focal weakness.   NO abdominal pain, no red blood per rectum or melena.  NO back pain, no tearing back pain.   NO dysuria, no haematuria.   Denies weight loss or anorexia.  No rash.   Notes 3-4 months of persistent scrotal oedema.  No penile pain, no scrotal pain.  No leg pain.  Remaining review of systems not contributory. (15 Lazaro 2019 23:54)    ID note-called for high grade MRSA bacteremia  patient admitted for acute heart failure exacerbation, new ELADIA, supratherapeutic INR. Was being diuresed and undergoing TAVR workup. On  became hypotensive -presumed to be due to overdiuresis, on  started to have fever to 101 F, had an RRT because he became unarousable, later coded with a PEA arrest. During intubation noted with thick material aspirated from airways. Currently intubated on 3 pressors, with worsening liver and renal function, found to have high grade MRSa bacteremia.  Central line and a line placed in CCU      PAST MEDICAL & SURGICAL HISTORY:  CAD (coronary artery disease)  Prostatitis syndrome  UTI (urinary tract infection)  Diabetes  Stented coronary artery  High cholesterol  HTN (hypertension)  Afib  S/p bare metal coronary artery stent      Allergies  latex (Unknown)  Sulfacetamide Sodium (Unknown)        ANTIMICROBIALS:  piperacillin/tazobactam IVPB. 3.375 every 12 hours      OTHER MEDS: MEDICATIONS  (STANDING):  acetaminophen    Suspension .. 650 every 6 hours PRN  acetaminophen   Tablet .. 650 every 6 hours PRN  ALBUTerol/ipratropium for Nebulization 3 every 6 hours  atorvastatin 40 at bedtime  clopidogrel Tablet 75 daily  dextrose 40% Gel 15 once PRN  dextrose 50% Injectable 12.5 once  dextrose 50% Injectable 25 once  dextrose 50% Injectable 25 once  finasteride 5 daily  glucagon  Injectable 1 once PRN  insulin glargine Injectable (LANTUS) 9 at bedtime  insulin lispro (HumaLOG) corrective regimen sliding scale  every 6 hours  midazolam Infusion 0.02 <Continuous>  norepinephrine Infusion 1 <Continuous>  pantoprazole  Injectable 40 daily  phenylephrine    Infusion 1.5 <Continuous>  vasopressin Infusion 0.04 <Continuous>      SOCIAL HISTORY: NO tobacco or ethanol use.  Lives alone at home.    FAMILY HISTORY:  Family history of hypertension (Mother): hx of CVA      REVIEW OF SYSTEMS:  [ x ] ROS unobtainable because:  intubated  [x] All other systems negative except as noted below:	    Constitutional:  [] fever [ ] chills  [ ] weight loss  [ ] weakness  Skin:  [ ] rash [ ] phlebitis	  Eyes: [ ] icterus [ ] pain  [ ] discharge	  ENMT: [ ] sore throat  [ ] thrush [ ] ulcers [ ] exudates  Respiratory: [ ] dyspnea [ ] hemoptysis [ ] cough [ ] sputum	  Cardiovascular:  [ ] chest pain [ ] palpitations [ ] edema	  Gastrointestinal:  [ ] nausea [ ] vomiting [ ] diarrhea [ ] constipation [ ] pain	  Genitourinary:  [ ] dysuria [ ] frequency [ ] hematuria [ ] discharge [ ] flank pain  [ ] incontinence  Musculoskeletal:  [ ] myalgias [ ] arthralgias [ ] arthritis  [ ] back pain  Neurological:  [ ] headache [ ] seizures  [ ] confusion/altered mental status  Psychiatric:  [ ] anxiety [ ] depression	  Hematology/Lymphatics:  [ ] lymphadenopathy  Endocrine:  [ ] adrenal [ ] thyroid  Allergic/Immunologic:	 [ ] transplant [ ] seasonal    Vital Signs Last 24 Hrs  T(F): 101.1 (19 @ 08:30), Max: 102.2 (19 @ 08:00)    Vital Signs Last 24 Hrs  HR: 88 (19 @ 13:15) (84 - 102)  BP: --  RR: 25 (19 @ 13:15)  SpO2: 98% (19 @ 13:15) (93% - 100%)  Wt(kg): --    PHYSICAL EXAM:  General: intubated, sedated on 3 pressors  HEAD/EYES: anicteric, PERRL  ENT:  supple RT IJ, Rt A line  Cardiovascular:   S1, S2 + MICHELLE  Respiratory:  clear bilaterally  GI:  soft, non-tender, normal bowel sounds  :  no CVA tenderness scrotal edema with abrasion  Musculoskeletal:  no synovitis  Neurologic:  grossly non-focal  Skin: multiple weeping skin abrasions over LE, abdominal wall and scrotum, without surrounding erythema   Lymph: no lymphadenopathy  Psychiatric:  not assessed  Vascular:  no phlebitis                                9.2    16.4  )-----------( 245      ( 2019 05:59 )             28.6           143  |  113<H>  |  111<H>  ----------------------------<  119<H>  5.6<H>   |  15<L>  |  3.41<H>    Ca    7.7<L>      2019 05:59  Phos  7.5       Mg     2.3         TPro  4.9<L>  /  Alb  1.2<L>  /  TBili  6.6<H>  /  DBili  x   /  AST  113<H>  /  ALT  38  /  AlkPhos  144<H>        Urinalysis Basic - ( 2019 20:40 )    Color: Altagracia / Appearance: Slightly Turbid / S.019 / pH: x  Gluc: x / Ketone: Negative  / Bili: Negative / Urobili: 2 mg/dL   Blood: x / Protein: 30 mg/dL / Nitrite: Negative   Leuk Esterase: Large / RBC: 30 /hpf / WBC >50   Sq Epi: x / Non Sq Epi: 2 / Bacteria: Many        MICROBIOLOGY:  .Blood Blood  19   Growth in anaerobic bottle: Gram Positive Cocci in Clusters  Growth in aerobic bottle: Gram Positive Cocci in Clusters  --    Growth in anaerobic bottle: Gram Positive Cocci in Clusters  Growth in aerobic bottle: Gram Positive Cocci in Clusters      .Blood Blood-Peripheral  19   Growth in aerobic and anaerobic bottles: Staphylococcus aureus  --  Blood Culture PCR              Vancomycin Level, Trough: 14.1 ug/mL (19 @ 13:54)  v            RADIOLOGY: < from: CT Head No Cont (19 @ 11:56) >  IMPRESSION:    Limited by out of field artifact and extensive streak artifact from   dental amalgam.    No gross evidence for large acute territorial infarct. No vasogenic edema   or acute intracranial hemorrhage.    < end of copied text >    < from: Xray Chest 1 View AP/PA (19 @ 08:37) >  Impression:    Suboptimal study. The right hemithorax isonly partially seen on the   current study. The heart is enlarged. Left lower lobe pneumonia and/or   atelectasis. Pulmonary vascular congestion. Endotracheal tube is in good   position. NG tube is in the stomach. A central line is seen on the right   and the tip is in superior vena cava. No pneumothorax.    < end of copied text >    < from: US Abdomen Complete (19 @ 14:46) >    FINDINGS:    Liver: Questionable nodular contour.    Bile ducts: Normal caliber. Common bile duct measures 5 mm.     Gallbladder: Layering biliary sludge. Mildly edematous gallbladder wall,   likely secondary to hypoalbuminemia.        Pancreas: Bilobed cyst in the pancreatic head measuring 1.8 x 1.7 x 1.6   cm, with mild prominence of the pancreatic duct, measuring up to 4 mm.    Spleen: 13 cm. Within normal limits.    Right kidney: 11.6 cm. No hydronephrosis. Echogenic renal cortex.    Left kidney: 12.1 cm.  No hydronephrosis. Echogenic renal cortex.    Ascites: Small volume ascites.    Aorta and IVC: Visualized portions are within normal limits.    Miscellaneous: Bilateral pleural effusions.    IMPRESSION:     Cyst in the pancreatic head with mild prominence of the pancreatic duct.     Layering sludge in the gallbladder.    Bilateral pleural effusions and small volume ascites.      < end of copied text >    < from: VA Duplex Carotid, Bilat (19 @ 19:22) >  IMPRESSION:   No hemodynamically significant stenosis within the bilateral carotid   arteries.    Measurement of carotid stenosis is based on velocity parameters that   correlate the residual internal carotid diameter with that of the more   distal vessel in accordance with a method such as the North American   Symptomatic Carotid Endarterectomy Trial (NASCET).    < end of copied text >    < from: CT Heart without Coronaries w/ IV Cont (19 @ 13:30) >  COMPARISON: None.    FINDINGS:     MEASUREMENTS:    Major aortic annulus diameter (systole, mm): 29.1  Perpendicular minor aortic annulus diameter (systole,mm): 23.9  Aortic annulus perimeter (systole, mm): 86  Aortic annulus area (systole, mm2): 557  LVOT minimum diameter (systole, mm): 29.5  LVOT 90 cross (systole, mm): 20.6  LVOT calcification: Mild  Distance from Aortic annulus to Left Main coronaryartery (diastole, mm):   12.3  Distance from Aortic annulus to Right Coronary artery (diastole, mm): 15.1  Sinus of Valsalva height, Right coronary (diastole, mm): 21.9  Sinus of Valsalva height, Left coronary (diastole, mm): 19.8  Sinus of Valsalva height, Non coronary (diastole, mm): 25.4  Sinus of Valsalva diameter, Right coronary (diastole, mm): 32.5  Sinus of Valsalva diameter, Left coronary (diastole, mm): 34.8  Sinus of Valsalva diameter, Non coronary (diastole, mm): 37.0  Sinus of Valsalva width, Left to Right coronary cusp (diastole, mm): 33.5  Sinus of Valsalva width, Right to Non coronary cusp (diastole, mm): 35.6  Sinus of Valsalva width, Left to Non coronary cusp (diastole, mm): 33.6  Sinus of Valsalva width, Average (mm): 34.2  Sinus of Valsalva perimeter (diastole, mm): 118  Diameter at the sinotubular junction (diastole, mm):33.8  Maximum ascending aorta diameter at 40 mm above annulus (diastole, mm):   38.0  Aortic root angulation (diastole, degrees): 60.9  Aortic root calcification: Mild  Annulus to puncture (mm): 83.9  Aortic valve calcium score : 3103    Abdominal Aorta:  Minimum lumen diameter (MLD) (mm): 16.7          Diameter perpendicular to MLD(mm): 18.7    ACCESS VESSEL MEASUREMENTS:  Left Femoral:     Minimum Lumen Diameter (mm): 7.5          Diameter perpendicular to MLD(mm): 9.9          Tortuosity: Mild          Calcification: Mild  Right Femoral:     Minimum Lumen Diameter (mm): 8.1          Diameter perpendicular to MLD(mm): 10.3          Tortuosity: Mild          Calcification: Mild  Left External Iliac:   Minimum Lumen Diameter (mm): 10.7          Diameter perpendicular to MLD(mm): 11.4          Tortuosity: Severe          Calcification: Mild  Left Common Iliac:   Minimum Lumen Diameter (mm): 10.1          Diameter perpendicular to MLD(mm): 13.3          Tortuosity: Mild          Calcification: 5  Right External Iliac:   Minimum Lumen Diameter (mm): 9.9          Diameter perpendicular to MLD(mm): 11.1          Tortuosity: Moderate  Calcification: Mild  Right Common Iliac:   Minimum Lumen Diameter (mm): 8.7          Diameter perpendicular to MLD(mm): 9.8          Tortuosity: Moderate          Calcification: Mild    L Subclavian/Axillary: Minimum Lumen Diameter (mm): 5.9  Diameter perpendicular to MLD(mm): 6.7          Tortuosity: Mild          Calcification: Mild    AIRWAYS AND LUNGS: The central tracheobronchial tree is patent.    Bilateral lower lobe partial atelectasis.    MEDIASTINUM AND PLEURA: There are no enlarged mediastinal, hilar or   axillary lymph nodes. The visualized portion of the thyroid gland is   unremarkable. There are moderate bilateral pleural effusions.  There is   no pneumothorax.      HEART AND VESSELS: There is mild cardiomegaly. There are coronary artery   and aortic calcifications.     There is no pericardial effusion.     LIVER: Within normal limits.   BILIARY SYSTEM: There is no biliary ductal dilatation.  GALLBLADDER: No radiopaque gallstones.     PANCREAS: Within normal limits.   SPLEEN: Within normal limits.   ADRENALS: Within normal limits.    KIDNEYS/URETERS: No hydronephrosis or obstructing stones.     BOWEL/MESENTERY: No bowel obstruction or wall thickening. The appendix is   not seen; however, there are no secondary signs of acute appendicitis.     URINARY BLADDER: Foci of air in the bladder.   REPRODUCTIVE ORGANS: No pelvic masses. High density foci in the pelvis   may be related to the prostate or bladder stones.    PERITONEUM/RETROPERITONEUM: No free air. Small volume ascites.     LYMPH NODES: No abdominal or pelvic lymphadenopathy.   VESSELS: Atherosclerotic calcifications are present. No abdominal aortic   aneurysm.    BONES: There are degenerative changes of the spine.   SOFT TISSUES: There is a small fluid containing right inguinal hernia.   Small fat-containing left adrenal hernia. Anasarca.    TUBES/LINES: None.      IMPRESSION:   1.  TAVR measurements as above.  2.  Moderate bilateral pleural effusions.  3.  High density foci in the pelvis may be related to the prostate or   bladder stones.  4.  Foci of air in the bladder, correlate for recent procedure.    < end of copied text > "HPI: NIGHT HOSPITALIST:   Patient UNKNOWN to me previously, assigned to me at this point via the ER to admit this 77 y/o M--patient with a history of CAD with past PCI, chronic atrial fibrillation on Coumadin (patient was told by his office physician above to stop his Coumadin and go to the ER), essential HTN, unspecified cardiac valve disorder, reported unspecified hemoccult positive stools in the office, type 2 DM on metformin, with the patient self referring to [place] directed by his office physician following an elevated INR and episode of epistaxis this AM following apparently 3 months of progressive dyspnoea and B/L LE oedema with poor aerobic functional status with dyspnoea with changing his clothes or ambulation to the BR.   Patient denies chest pain/pressure.  NO dyspnoea at present.  NO HA< no focal weakness.   NO abdominal pain, no red blood per rectum or melena.  NO back pain, no tearing back pain.   NO dysuria, no haematuria.   Denies weight loss or anorexia.  No rash.   Notes 3-4 months of persistent scrotal oedema.  No penile pain, no scrotal pain.  No leg pain.  Remaining review of systems not contributory. (15 Lazaro 2019 23:54)"    Above reviewed.    ID note-called for high grade MRSA bacteremia  patient admitted for acute heart failure exacerbation, new ELADIA, supratherapeutic INR. Was being diuresed and undergoing TAVR workup. On  became hypotensive -presumed to be due to overdiuresis, on  started to have fever to 101 F, had an RRT because he became unarousable, later coded with a PEA arrest. During intubation noted with thick material aspirated from airways. Currently intubated on 3 pressors, with worsening liver and renal function, found to have high grade MRSa bacteremia.  Central line and a line placed in CCU    PAST MEDICAL & SURGICAL HISTORY:  CAD (coronary artery disease)  Prostatitis syndrome  UTI (urinary tract infection)  Diabetes  Stented coronary artery  High cholesterol  HTN (hypertension)  Afib  S/p bare metal coronary artery stent    Allergies  latex (Unknown)  Sulfacetamide Sodium (Unknown)    ANTIMICROBIALS:  piperacillin/tazobactam IVPB. 3.375 every 12 hours    OTHER MEDS: MEDICATIONS  (STANDING):  acetaminophen    Suspension .. 650 every 6 hours PRN  acetaminophen   Tablet .. 650 every 6 hours PRN  ALBUTerol/ipratropium for Nebulization 3 every 6 hours  atorvastatin 40 at bedtime  clopidogrel Tablet 75 daily  dextrose 40% Gel 15 once PRN  dextrose 50% Injectable 12.5 once  dextrose 50% Injectable 25 once  dextrose 50% Injectable 25 once  finasteride 5 daily  glucagon  Injectable 1 once PRN  insulin glargine Injectable (LANTUS) 9 at bedtime  insulin lispro (HumaLOG) corrective regimen sliding scale  every 6 hours  midazolam Infusion 0.02 <Continuous>  norepinephrine Infusion 1 <Continuous>  pantoprazole  Injectable 40 daily  phenylephrine    Infusion 1.5 <Continuous>  vasopressin Infusion 0.04 <Continuous>    SOCIAL HISTORY: NO tobacco or ethanol use.  Lives alone at home.    FAMILY HISTORY:  Family history of hypertension (Mother): hx of CVA    REVIEW OF SYSTEMS:  [ x ] ROS unobtainable because:  intubated, not able to verbalizes  [  ] All other systems negative except as noted below:	    Constitutional:  [] fever [ ] chills  [ ] weight loss  [ ] weakness  Skin:  [ ] rash [ ] phlebitis	  Eyes: [ ] icterus [ ] pain  [ ] discharge	  ENMT: [ ] sore throat  [ ] thrush [ ] ulcers [ ] exudates  Respiratory: [ ] dyspnea [ ] hemoptysis [ ] cough [ ] sputum	  Cardiovascular:  [ ] chest pain [ ] palpitations [ ] edema	  Gastrointestinal:  [ ] nausea [ ] vomiting [ ] diarrhea [ ] constipation [ ] pain	  Genitourinary:  [ ] dysuria [ ] frequency [ ] hematuria [ ] discharge [ ] flank pain  [ ] incontinence  Musculoskeletal:  [ ] myalgias [ ] arthralgias [ ] arthritis  [ ] back pain  Neurological:  [ ] headache [ ] seizures  [ ] confusion/altered mental status  Psychiatric:  [ ] anxiety [ ] depression	  Hematology/Lymphatics:  [ ] lymphadenopathy  Endocrine:  [ ] adrenal [ ] thyroid  Allergic/Immunologic:	 [ ] transplant [ ] seasonal    Vital Signs Last 24 Hrs  T(F): 101.1 (19 @ 08:30), Max: 102.2 (19 @ 08:00)    Vital Signs Last 24 Hrs  HR: 88 (19 @ 13:15) (84 - 102)  RR: 25 (19 @ 13:15)  SpO2: 98% (19 @ 13:15) (93% - 100%)    PHYSICAL EXAM:  General: intubated, sedated on 3 pressors  HEAD/EYES: anicteric, PERRL  ENT:  supple RT IJ, Rt A line  Cardiovascular:   S1, S2 + MICHELLE  Respiratory:  clear bilaterally  GI:  soft, non-tender, normal bowel sounds  :  no CVA tenderness scrotal edema with abrasion  Musculoskeletal:  no synovitis  Neurologic:  grossly non-focal  Skin: multiple weeping skin abrasions over LE, abdominal wall and scrotum, without surrounding erythema   Lymph: no lymphadenopathy  Psychiatric:  not assessed  Vascular:  no phlebitis    Labs:                        9.2    16.4  )-----------( 245      ( 2019 05:59 )             28.6         143  |  113<H>  |  111<H>  ----------------------------<  119<H>  5.6<H>   |  15<L>  |  3.41<H>    Ca    7.7<L>      2019 05:59  Phos  7.5       Mg     2.3         TPro  4.9<L>  /  Alb  1.2<L>  /  TBili  6.6<H>  /  DBili  x   /  AST  113<H>  /  ALT  38  /  AlkPhos  144<H>      Urinalysis Basic - ( 2019 20:40 )    Color: Altagracia / Appearance: Slightly Turbid / S.019 / pH: x  Gluc: x / Ketone: Negative  / Bili: Negative / Urobili: 2 mg/dL   Blood: x / Protein: 30 mg/dL / Nitrite: Negative   Leuk Esterase: Large / RBC: 30 /hpf / WBC >50   Sq Epi: x / Non Sq Epi: 2 / Bacteria: Many    MICROBIOLOGY:  .Blood Blood  19   Growth in anaerobic bottle: Gram Positive Cocci in Clusters  Growth in aerobic bottle: Gram Positive Cocci in Clusters  --    Growth in anaerobic bottle: Gram Positive Cocci in Clusters  Growth in aerobic bottle: Gram Positive Cocci in Clusters    .Blood Blood-Peripheral  19   Growth in aerobic and anaerobic bottles: Staphylococcus aureus  --  Blood Culture PCR MRSA    Vancomycin Level, Trough: 14.1 ug/mL (19 @ 13:54)    RADIOLOGY: < from: CT Head No Cont (19 @ 11:56) >  IMPRESSION:    Limited by out of field artifact and extensive streak artifact from   dental amalgam.    No gross evidence for large acute territorial infarct. No vasogenic edema   or acute intracranial hemorrhage.    < end of copied text >    < from: Xray Chest 1 View AP/PA (19 @ 08:37) >  Impression:    Suboptimal study. The right hemithorax isonly partially seen on the   current study. The heart is enlarged. Left lower lobe pneumonia and/or   atelectasis. Pulmonary vascular congestion. Endotracheal tube is in good   position. NG tube is in the stomach. A central line is seen on the right   and the tip is in superior vena cava. No pneumothorax.    < end of copied text >    < from: US Abdomen Complete (19 @ 14:46) >    FINDINGS:    Liver: Questionable nodular contour.    Bile ducts: Normal caliber. Common bile duct measures 5 mm.     Gallbladder: Layering biliary sludge. Mildly edematous gallbladder wall,   likely secondary to hypoalbuminemia.        Pancreas: Bilobed cyst in the pancreatic head measuring 1.8 x 1.7 x 1.6   cm, with mild prominence of the pancreatic duct, measuring up to 4 mm.    Spleen: 13 cm. Within normal limits.    Right kidney: 11.6 cm. No hydronephrosis. Echogenic renal cortex.    Left kidney: 12.1 cm.  No hydronephrosis. Echogenic renal cortex.    Ascites: Small volume ascites.    Aorta and IVC: Visualized portions are within normal limits.    Miscellaneous: Bilateral pleural effusions.    IMPRESSION:     Cyst in the pancreatic head with mild prominence of the pancreatic duct.     Layering sludge in the gallbladder.    Bilateral pleural effusions and small volume ascites.      < end of copied text >    < from: VA Duplex Carotid, Bilat (19 @ 19:22) >  IMPRESSION:   No hemodynamically significant stenosis within the bilateral carotid   arteries.    Measurement of carotid stenosis is based on velocity parameters that   correlate the residual internal carotid diameter with that of the more   distal vessel in accordance with a method such as the North American   Symptomatic Carotid Endarterectomy Trial (NASCET).    < end of copied text >    < from: CT Heart without Coronaries w/ IV Cont (19 @ 13:30) >  COMPARISON: None.    FINDINGS:     MEASUREMENTS:    Major aortic annulus diameter (systole, mm): 29.1  Perpendicular minor aortic annulus diameter (systole,mm): 23.9  Aortic annulus perimeter (systole, mm): 86  Aortic annulus area (systole, mm2): 557  LVOT minimum diameter (systole, mm): 29.5  LVOT 90 cross (systole, mm): 20.6  LVOT calcification: Mild  Distance from Aortic annulus to Left Main coronaryartery (diastole, mm):   12.3  Distance from Aortic annulus to Right Coronary artery (diastole, mm): 15.1  Sinus of Valsalva height, Right coronary (diastole, mm): 21.9  Sinus of Valsalva height, Left coronary (diastole, mm): 19.8  Sinus of Valsalva height, Non coronary (diastole, mm): 25.4  Sinus of Valsalva diameter, Right coronary (diastole, mm): 32.5  Sinus of Valsalva diameter, Left coronary (diastole, mm): 34.8  Sinus of Valsalva diameter, Non coronary (diastole, mm): 37.0  Sinus of Valsalva width, Left to Right coronary cusp (diastole, mm): 33.5  Sinus of Valsalva width, Right to Non coronary cusp (diastole, mm): 35.6  Sinus of Valsalva width, Left to Non coronary cusp (diastole, mm): 33.6  Sinus of Valsalva width, Average (mm): 34.2  Sinus of Valsalva perimeter (diastole, mm): 118  Diameter at the sinotubular junction (diastole, mm):33.8  Maximum ascending aorta diameter at 40 mm above annulus (diastole, mm):   38.0  Aortic root angulation (diastole, degrees): 60.9  Aortic root calcification: Mild  Annulus to puncture (mm): 83.9  Aortic valve calcium score : 3103    Abdominal Aorta:  Minimum lumen diameter (MLD) (mm): 16.7          Diameter perpendicular to MLD(mm): 18.7    ACCESS VESSEL MEASUREMENTS:  Left Femoral:     Minimum Lumen Diameter (mm): 7.5          Diameter perpendicular to MLD(mm): 9.9          Tortuosity: Mild          Calcification: Mild  Right Femoral:     Minimum Lumen Diameter (mm): 8.1          Diameter perpendicular to MLD(mm): 10.3          Tortuosity: Mild          Calcification: Mild  Left External Iliac:   Minimum Lumen Diameter (mm): 10.7          Diameter perpendicular to MLD(mm): 11.4          Tortuosity: Severe          Calcification: Mild  Left Common Iliac:   Minimum Lumen Diameter (mm): 10.1          Diameter perpendicular to MLD(mm): 13.3          Tortuosity: Mild          Calcification: 5  Right External Iliac:   Minimum Lumen Diameter (mm): 9.9          Diameter perpendicular to MLD(mm): 11.1          Tortuosity: Moderate  Calcification: Mild  Right Common Iliac:   Minimum Lumen Diameter (mm): 8.7          Diameter perpendicular to MLD(mm): 9.8          Tortuosity: Moderate          Calcification: Mild    L Subclavian/Axillary: Minimum Lumen Diameter (mm): 5.9  Diameter perpendicular to MLD(mm): 6.7          Tortuosity: Mild          Calcification: Mild    AIRWAYS AND LUNGS: The central tracheobronchial tree is patent.    Bilateral lower lobe partial atelectasis.    MEDIASTINUM AND PLEURA: There are no enlarged mediastinal, hilar or   axillary lymph nodes. The visualized portion of the thyroid gland is   unremarkable. There are moderate bilateral pleural effusions.  There is   no pneumothorax.      HEART AND VESSELS: There is mild cardiomegaly. There are coronary artery   and aortic calcifications.     There is no pericardial effusion.     LIVER: Within normal limits.   BILIARY SYSTEM: There is no biliary ductal dilatation.  GALLBLADDER: No radiopaque gallstones.     PANCREAS: Within normal limits.   SPLEEN: Within normal limits.   ADRENALS: Within normal limits.    KIDNEYS/URETERS: No hydronephrosis or obstructing stones.     BOWEL/MESENTERY: No bowel obstruction or wall thickening. The appendix is   not seen; however, there are no secondary signs of acute appendicitis.     URINARY BLADDER: Foci of air in the bladder.   REPRODUCTIVE ORGANS: No pelvic masses. High density foci in the pelvis   may be related to the prostate or bladder stones.    PERITONEUM/RETROPERITONEUM: No free air. Small volume ascites.     LYMPH NODES: No abdominal or pelvic lymphadenopathy.   VESSELS: Atherosclerotic calcifications are present. No abdominal aortic   aneurysm.    BONES: There are degenerative changes of the spine.   SOFT TISSUES: There is a small fluid containing right inguinal hernia.   Small fat-containing left adrenal hernia. Anasarca.    TUBES/LINES: None.    IMPRESSION:   1.  TAVR measurements as above.  2.  Moderate bilateral pleural effusions.  3.  High density foci in the pelvis may be related to the prostate or   bladder stones.  4.  Foci of air in the bladder, correlate for recent procedure.

## 2019-01-28 NOTE — PROGRESS NOTE ADULT - SUBJECTIVE AND OBJECTIVE BOX
[facility] DIVISION OF KIDNEY DISEASES AND HYPERTENSION -- FOLLOW UP NOTE  --------------------------------------------------------------------------------  Chief Complaint:  none  24 hour events/subjective:  anuric now      PAST HISTORY  --------------------------------------------------------------------------------  No significant changes to PMH, PSH, FHx, SHx, unless otherwise noted    ALLERGIES & MEDICATIONS  --------------------------------------------------------------------------------  Allergies    latex (Unknown)  Sulfacetamide Sodium (Unknown)    Intolerances      Standing Inpatient Medications  ALBUTerol/ipratropium for Nebulization 3 milliLiter(s) Nebulizer every 6 hours  artificial tears (preservative free) Ophthalmic Solution 1 Drop(s) Both EYES every 6 hours  atorvastatin 40 milliGRAM(s) Oral at bedtime  chlorhexidine 4% Liquid 1 Application(s) Topical <User Schedule>  clopidogrel Tablet 75 milliGRAM(s) Oral daily  dextrose 5%. 1000 milliLiter(s) IV Continuous <Continuous>  dextrose 50% Injectable 12.5 Gram(s) IV Push once  dextrose 50% Injectable 25 Gram(s) IV Push once  dextrose 50% Injectable 25 Gram(s) IV Push once  finasteride 5 milliGRAM(s) Oral daily  insulin glargine Injectable (LANTUS) 9 Unit(s) SubCutaneous at bedtime  insulin lispro (HumaLOG) corrective regimen sliding scale   SubCutaneous every 6 hours  midazolam Infusion 0.02 mG/kG/Hr IV Continuous <Continuous>  norepinephrine Infusion 1 MICROgram(s)/kG/Min IV Continuous <Continuous>  nystatin Powder 1 Application(s) Topical two times a day  pantoprazole  Injectable 40 milliGRAM(s) IV Push daily  petrolatum Ophthalmic Ointment 1 Application(s) Both EYES every 8 hours  phenylephrine    Infusion 1.5 MICROgram(s)/kG/Min IV Continuous <Continuous>  piperacillin/tazobactam IVPB. 3.375 Gram(s) IV Intermittent every 12 hours  vasopressin Infusion 0.04 Unit(s)/Min IV Continuous <Continuous>    PRN Inpatient Medications  acetaminophen    Suspension .. 650 milliGRAM(s) Enteral Tube every 6 hours PRN  acetaminophen   Tablet .. 650 milliGRAM(s) Oral every 6 hours PRN  dextrose 40% Gel 15 Gram(s) Oral once PRN  glucagon  Injectable 1 milliGRAM(s) IntraMuscular once PRN      REVIEW OF SYSTEMS  --------------------------------------------------------------------------------  unable to do    VITALS/PHYSICAL EXAM  --------------------------------------------------------------------------------  T(C): 38.3 (01-28-19 @ 04:00), Max: 38.9 (01-28-19 @ 00:00)  HR: 91 (01-28-19 @ 08:19) (84 - 102)  BP: 87/59 (01-27-19 @ 14:15) (87/59 - 87/59)  RR: 22 (01-28-19 @ 07:00) (20 - 45)  SpO2: 99% (01-28-19 @ 08:19) (93% - 100%)  Wt(kg): --        01-27-19 @ 07:01  -  01-28-19 @ 07:00  --------------------------------------------------------  IN: 4835.4 mL / OUT: 50 mL / NET: 4785.4 mL      Gen: sedated, intubated  HEENT: PERRL, supple neck, clear oropharynx  Pulm: rales bl  CV: RRR, S1S2; no rub  Abd: +BS, soft, nontender/nondistended  LE: Warm, FROM, no clubbing, intact strength; no edema          LABS/STUDIES  --------------------------------------------------------------------------------              9.2    16.4  >-----------<  245      [01-28-19 @ 05:59]              28.6     143  |  113  |  111  ----------------------------<  119      [01-28-19 @ 05:59]  5.6   |  15  |  3.41        Ca     7.7     [01-28-19 @ 05:59]      Mg     2.3     [01-28-19 @ 05:59]      Phos  7.5     [01-28-19 @ 05:59]    TPro  4.9  /  Alb  1.2  /  TBili  6.6  /  DBili  x   /  AST  113  /  ALT  38  /  AlkPhos  144  [01-28-19 @ 05:59]    PT/INR: PT 96.5 , INR 7.94       [01-28-19 @ 05:59]  PTT: 71.9       [01-28-19 @ 05:59]          [01-28-19 @ 00:10]  Serum Osmolality 331      [01-27-19 @ 04:08]    Creatinine Trend:  SCr 3.41 [01-28 @ 05:59]  SCr 3.24 [01-28 @ 00:10]  SCr 3.11 [01-27 @ 18:09]  SCr 2.99 [01-27 @ 10:32]  SCr 2.93 [01-27 @ 04:08]    Urinalysis - [01-26-19 @ 20:40]      Color Altagracia / Appearance Slightly Turbid / SG 1.019 / pH 6.0      Gluc Negative / Ketone Negative  / Bili Negative / Urobili 2 mg/dL       Blood Moderate / Protein 30 mg/dL / Leuk Est Large / Nitrite Negative      RBC 30 / WBC >50 / Hyaline 2 / Gran  / Sq Epi  / Non Sq Epi 2 / Bacteria Many    Urine Creatinine 101      [01-27-19 @ 20:29]  Urine Protein 343      [01-27-19 @ 20:29]  Urine Sodium 29      [01-27-19 @ 16:43]    HbA1c 5.6      [01-16-19 @ 08:24]    HBsAg Nonreact      [01-25-19 @ 15:50]  HCV 0.06, Nonreact      [01-25-19 @ 15:50]

## 2019-01-28 NOTE — PROVIDER CONTACT NOTE (CRITICAL VALUE NOTIFICATION) - ACTION/TREATMENT ORDERED:
Continue to monitor
Cont. to monitor pt.
NP AWARE
NP informed, Phytonadione solution ordered. Will continue to monitor
Continue to monitor, no interventions
Monitor.
NP aware, continue to monitor, will give 40 potassium x1 PO, and 10 potassium x3 IV
will continue to monitor, no new treatment ordered

## 2019-01-28 NOTE — PROGRESS NOTE ADULT - PROBLEM SELECTOR PROBLEM 3
Severe aortic stenosis
Acute respiratory failure with hypoxia
ELADIA (acute kidney injury)
Edema
Bradycardia
CAD (coronary artery disease)

## 2019-01-28 NOTE — PROGRESS NOTE ADULT - NSHPATTENDINGPLANDISCUSS_GEN_ALL_CORE
Medicine. To reach Cardiology Attending call during weekdays Spectra [phone] or Fellow [phone].
Patient and Heart Team
ccu NP and attending
Medicine
TAVR team and medicine
Medicine
Medicine
Medicine attending
TAVR team and medicine
ccu
NP
ccu
NP and nephrology
NP

## 2019-01-28 NOTE — PROVIDER CONTACT NOTE (CRITICAL VALUE NOTIFICATION) - SITUATION
Positive blood cultures 1/28/2019 6am: gram (+) cocci aerobic and anaerobic cocci and clusters  1/26/2019 832am: growth aerobic and anaerobic bottles MRSA    1/26/2019 834am: growth aerobic and anaerobic bottles MRSA    1/26/2019 834am: growth aerobic and anaerobic bottles MRSA     1/27/2019 405am: growth aerobic and anaerobic bottles MRSA
Gram + cocci in clusters in aerobic bottle from 1/28
INR 5.57
INR 7.61
Patient intubated and bacteremic, received Vitamin K for increased INR.
pt potassium 2.9, on lasix gtt at 5cc/h, pt admit with CHF exacerbation

## 2019-01-28 NOTE — CHART NOTE - NSCHARTNOTEFT_GEN_A_CORE
Spoke with patients' brother Kev this AM regarding patient overall poor prognosis. We discussed his hemodynamic status at this point (shock state despite 3 vasopressors) and his lab values which indicate multiorgan failure. He understands that Mr. Vega is decompensating and has started the process of dying. He wishes to pursue patient comfort and requests patient be made DNR/DNI. He understands that CVVH requires shiley placement which carries a high risk given his multiorgan failure and does not wish to pursue it at this time. He would appreciate if the patient can be stabilized as much as medically feasible until he arrives from [place] (tonight or tmw AM) so that he can see his loved one before he passes away. Case d/w CCU staff, fellow, and attending.

## 2019-01-28 NOTE — PROGRESS NOTE ADULT - PROBLEM SELECTOR PROBLEM 2
Acute renal failure, unspecified acute renal failure type
Bradycardia
Coronary artery disease involving native coronary artery of native heart without angina pectoris
Metabolic acidosis
Supratherapeutic INR
Acute respiratory failure with hypoxia
Acute renal failure, unspecified acute renal failure type
Acute respiratory failure with hypoxia

## 2019-01-28 NOTE — PROVIDER CONTACT NOTE (CRITICAL VALUE NOTIFICATION) - RECOMMENDATIONS
NP informed, no active bleeding noted. Vit K?
Continue to monitor, no interventions
Monitor.
notify NP, continue to monitor
will continue to monitor, no new treatment ordered

## 2019-01-28 NOTE — CHART NOTE - NSCHARTNOTEFT_GEN_A_CORE
====================  CCU MIDNIGHT ROUNDS  ====================    JOSUÉ RAMIREZ  [id number]    ====================  SUMMARY: HPI:  NIGHT HOSPITALIST:   Patient UNKNOWN to me previously, assigned to me at this point via the ER to admit this 79 y/o M--patient with a history of CAD with past PCI, chronic atrial fibrillation on Coumadin (patient was told by his office physician above to stop his Coumadin and go to the ER), essential HTN, unspecified cardiac valve disorder, reported unspecified hemoccult positive stools in the office, type 2 DM on metformin, with the patient self referring to [place] directed by his office physician following an elevated INR and episode of epistaxis this AM following apparently 3 months of progressive dyspnoea and B/L LE oedema with poor aerobic functional status with dyspnoea with changing his clothes or ambulation to the BR.   Patient denies chest pain/pressure.  NO dyspnoea at present.  NO HA< no focal weakness.   NO abdominal pain, no red blood per rectum or melena.  NO back pain, no tearing back pain.   NO dysuria, no haematuria.   Denies weight loss or anorexia.  No rash.   Notes 3-4 months of persistent scrotal oedema.  No penile pain, no scrotal pain.  No leg pain.  Remaining review of systems not contributory. (15 Lazaro 2019 23:54)    ====================        ====================  NEW EVENTS:  ====================        ====================  VITALS (Last 12 hrs):  ====================    T(C): 38.1 (01-28-19 @ 17:00), Max: 38.1 (01-28-19 @ 17:00)  HR: 66 (01-28-19 @ 23:30) (66 - 92)  BP: --  BP(mean): --  ABP: 46/28 (01-28-19 @ 23:30) (44/26 - 96/46)  ABP(mean): 36 (01-28-19 @ 23:30) (34 - 70)  RR: 28 (01-28-19 @ 23:30) (22 - 48)  SpO2: 57% (01-28-19 @ 23:30) (57% - 98%)  Wt(kg): --  CVP(mm Hg): 11 (01-28-19 @ 23:30) (3 - 11)  CO: --  CI: --  PA: --  PA(mean): --  PA(direct): --  PCWP: --  SVR: --    TELEMETRY:    Mode: AC/ CMV (Assist Control/ Continuous Mandatory Ventilation)  RR (machine): 22  TV (machine): 550  FiO2: 50  PEEP: 5  ITime: 1  MAP: 12  PIP: 21      *BLOOD GAS/ARTERIAL/MIXED/VENOUS  *LACTATE    I&O's Summary    27 Jan 2019 07:01 - 28 Jan 2019 07:00  --------------------------------------------------------  IN: 5018.8 mL / OUT: 50 mL / NET: 4968.8 mL    28 Jan 2019 07:01  -  28 Jan 2019 23:51  --------------------------------------------------------  IN: 8810.3 mL / OUT: 5 mL / NET: 8805.3 mL        ====================  PLAN:  ====================    HEALTH ISSUES - PROBLEM Dx:  Septic shock: Septic shock  Supratherapeutic INR: Supratherapeutic INR  Acute renal failure, unspecified acute renal failure type: Acute renal failure, unspecified acute renal failure type  Coronary artery disease involving native coronary artery of native heart without angina pectoris: Coronary artery disease involving native coronary artery of native heart without angina pectoris  Hypokalemia: Hypokalemia  Acute respiratory failure with hypoxia: Acute respiratory failure with hypoxia  Hypernatremia: Hypernatremia  CAD (coronary artery disease): CAD (coronary artery disease)  CHF (congestive heart failure): CHF (congestive heart failure)  Severe aortic stenosis: Severe aortic stenosis  Epistaxis: Epistaxis  Scrotal edema: Scrotal edema  Chronic atrial fibrillation: Chronic atrial fibrillation  Type 2 diabetes mellitus with other diabetic kidney complication, without long-term current use of insulin: Type 2 diabetes mellitus with other diabetic kidney complication, without long-term current use of insulin  Bradycardia: Bradycardia  Acute on chronic systolic congestive heart failure: Acute on chronic systolic congestive heart failure  HTN (hypertension): HTN (hypertension)  Edema: Edema  Metabolic acidosis: Metabolic acidosis  ELADIA (acute kidney injury): ELADIA (acute kidney injury)        HEALTH ISSUES - R/O PROBLEM Dx:      Yahaira Parsons CCU PA #[id number] ====================  CCU MIDNIGHT ROUNDS  ====================    JOSUÉ RAMIREZ  [id number]    ====================  SUMMARY: HPI:  NIGHT HOSPITALIST:   Patient UNKNOWN to me previously, assigned to me at this point via the ER to admit this 77 y/o M--patient with a history of CAD with past PCI, chronic atrial fibrillation on Coumadin (patient was told by his office physician above to stop his Coumadin and go to the ER), essential HTN, unspecified cardiac valve disorder, reported unspecified hemoccult positive stools in the office, type 2 DM on metformin, with the patient self referring to [place] directed by his office physician following an elevated INR and episode of epistaxis this AM following apparently 3 months of progressive dyspnoea and B/L LE oedema with poor aerobic functional status with dyspnoea with changing his clothes or ambulation to the BR.   Patient denies chest pain/pressure.  NO dyspnoea at present.  NO HA< no focal weakness.   NO abdominal pain, no red blood per rectum or melena.  NO back pain, no tearing back pain.   NO dysuria, no haematuria.   Denies weight loss or anorexia.  No rash.   Notes 3-4 months of persistent scrotal oedema.  No penile pain, no scrotal pain.  No leg pain.  Remaining review of systems not contributory. (15 Lazaro 2019 23:54)    ====================        ====================  NEW EVENTS: Pt continues to decline while max'd out on pressors. Contacted pt's brother who is now in [place]. Not able to come to hospital tonight, but will try. Currently will continue with pressors so pt's brother can try to come to hospital.  ====================        ====================  VITALS (Last 12 hrs):  ====================    T(C): 38.1 (01-28-19 @ 17:00), Max: 38.1 (01-28-19 @ 17:00)  HR: 66 (01-28-19 @ 23:30) (66 - 92)  BP: --  BP(mean): --  ABP: 46/28 (01-28-19 @ 23:30) (44/26 - 96/46)  ABP(mean): 36 (01-28-19 @ 23:30) (34 - 70)  RR: 28 (01-28-19 @ 23:30) (22 - 48)  SpO2: 57% (01-28-19 @ 23:30) (57% - 98%)  Wt(kg): --  CVP(mm Hg): 11 (01-28-19 @ 23:30) (3 - 11)  CO: --  CI: --  PA: --  PA(mean): --  PA(direct): --  PCWP: --  SVR: --    TELEMETRY:    Mode: AC/ CMV (Assist Control/ Continuous Mandatory Ventilation)  RR (machine): 22  TV (machine): 550  FiO2: 50  PEEP: 5  ITime: 1  MAP: 12  PIP: 21      *BLOOD GAS/ARTERIAL/MIXED/VENOUS  *LACTATE    I&O's Summary    27 Jan 2019 07:01  -  28 Jan 2019 07:00  --------------------------------------------------------  IN: 5018.8 mL / OUT: 50 mL / NET: 4968.8 mL    28 Jan 2019 07:01  -  28 Jan 2019 23:51  --------------------------------------------------------  IN: 8810.3 mL / OUT: 5 mL / NET: 8805.3 mL        ====================  PLAN:     #Neuro  - Sedation off @530 AM  - No purposeful movements. Will cont to monitor    #Pulm  Acute respiratory failure  - ETT to MV, PRVC mode 520/5/22/50%, lip line readjusted to 25cm  -Wean as tolerated in AM  -Trend ABG    #Cardiac  s/p cardiac arrest   -Titrate levo, vasopressin, and yoselyn gtt as tolerated, currently continues to require maximum amount  -TTE in the AM    Acute decompensated HF  -Holding lasix due to hypotension  -Strict I+O, daily weights    CAD  -Continue DAPT, statin    Severe AS  -Fluids PRN, CVP goal 6-8  -Monitor UOP  -f/u structural heart for TAVR eval    #Renal   ELADIA   -Trend SCr, holding diuretics  -Monitor urine output  -Dose meds renally, avoid nephrotoxic meds  - CVVH today    #Endo  -Continue lantus 9 units  -ISS     #ID   Sepsis  -(+) blood cultures on 1/27 with gram positive cocci in clusters  -Daily Blood cultures  -Continue zosyn 3.375 (started 1/26)  -Continue vanco 1g (started 1/26), dose by level  -Consult ID  ====================    HEALTH ISSUES - PROBLEM Dx:  Septic shock: Septic shock  Supratherapeutic INR: Supratherapeutic INR  Acute renal failure, unspecified acute renal failure type: Acute renal failure, unspecified acute renal failure type  Coronary artery disease involving native coronary artery of native heart without angina pectoris: Coronary artery disease involving native coronary artery of native heart without angina pectoris  Hypokalemia: Hypokalemia  Acute respiratory failure with hypoxia: Acute respiratory failure with hypoxia  Hypernatremia: Hypernatremia  CAD (coronary artery disease): CAD (coronary artery disease)  CHF (congestive heart failure): CHF (congestive heart failure)  Severe aortic stenosis: Severe aortic stenosis  Epistaxis: Epistaxis  Scrotal edema: Scrotal edema  Chronic atrial fibrillation: Chronic atrial fibrillation  Type 2 diabetes mellitus with other diabetic kidney complication, without long-term current use of insulin: Type 2 diabetes mellitus with other diabetic kidney complication, without long-term current use of insulin  Bradycardia: Bradycardia  Acute on chronic systolic congestive heart failure: Acute on chronic systolic congestive heart failure  HTN (hypertension): HTN (hypertension)  Edema: Edema  Metabolic acidosis: Metabolic acidosis  ELADIA (acute kidney injury): ELADIA (acute kidney injury)

## 2019-01-28 NOTE — PROGRESS NOTE ADULT - ATTENDING COMMENTS
Patient is seen and examined with fellow, NP and the CCU house-staff. I agree with the history, physical and the assessment and plan.  has been anuric - will start CVVHD  wean sedation to assess mental status  CY head pending  wean pressors  repeat TTE (with possible plan of PRABHAKAR)  c/w abx - f/u with ID  f/u with family regarding advanced directives

## 2019-01-28 NOTE — CONSULT NOTE ADULT - CONSULT REASON
Severe symptomatic aortic stenosis
CHF exacerbation, management of severe AS
ELADIA
MRSA bacteremia
RLE wound

## 2019-01-28 NOTE — PROVIDER CONTACT NOTE (CRITICAL VALUE NOTIFICATION) - ASSESSMENT
No s/s bleeding noted.
Pt is alert and oriented x4, pt denies any distress, No s/s of bleeding noted
no active  signs of bleeding
Gram + cocci in clusters in aerobic bottle from 1/28, pt hemodynamically unstable. Pt is DNR/DNI. On IV ABX
INR 7.61, patient asymptomatic
No active signs of bleeding noted.
pt stable, vitals stable, no c/o sob or chest pain, no events on tele

## 2019-01-28 NOTE — CONSULT NOTE ADULT - REASON FOR ADMISSION
3 months of progressive B/L LE oedema and dyspnoea on exertion.

## 2019-01-28 NOTE — PROGRESS NOTE ADULT - SUBJECTIVE AND OBJECTIVE BOX
PATIENT:  JOSUÉ RAMIREZ  [id number]    CHIEF COMPLAINT:  Patient is a 78y old  Male who presents with a chief complaint of 3 months of progressive B/L LE oedema and dyspnoea on exertion. (2019 08:33)      INTERVAL HISTORY: Sedation turned off at 530 this AM w/ no purposeful movements/still unaraousable. Weaning yoselyn. UOP/24h 45cc. Bcx  Gram positive cocci grew clusters.         MEDICATIONS  (STANDING):  ALBUTerol/ipratropium for Nebulization 3 milliLiter(s) Nebulizer every 6 hours  artificial tears (preservative free) Ophthalmic Solution 1 Drop(s) Both EYES every 6 hours  atorvastatin 40 milliGRAM(s) Oral at bedtime  chlorhexidine 4% Liquid 1 Application(s) Topical <User Schedule>  clopidogrel Tablet 75 milliGRAM(s) Oral daily  dextrose 5%. 1000 milliLiter(s) (50 mL/Hr) IV Continuous <Continuous>  dextrose 50% Injectable 12.5 Gram(s) IV Push once  dextrose 50% Injectable 25 Gram(s) IV Push once  dextrose 50% Injectable 25 Gram(s) IV Push once  finasteride 5 milliGRAM(s) Oral daily  insulin glargine Injectable (LANTUS) 9 Unit(s) SubCutaneous at bedtime  insulin lispro (HumaLOG) corrective regimen sliding scale   SubCutaneous every 6 hours  midazolam Infusion 0.02 mG/kG/Hr (1.928 mL/Hr) IV Continuous <Continuous>  norepinephrine Infusion 1 MICROgram(s)/kG/Min (90.375 mL/Hr) IV Continuous <Continuous>  nystatin Powder 1 Application(s) Topical two times a day  pantoprazole  Injectable 40 milliGRAM(s) IV Push daily  petrolatum Ophthalmic Ointment 1 Application(s) Both EYES every 8 hours  phenylephrine    Infusion 1.5 MICROgram(s)/kG/Min (27.113 mL/Hr) IV Continuous <Continuous>  piperacillin/tazobactam IVPB. 3.375 Gram(s) IV Intermittent every 12 hours  vasopressin Infusion 0.04 Unit(s)/Min (2.4 mL/Hr) IV Continuous <Continuous>    MEDICATIONS  (PRN):  acetaminophen    Suspension .. 650 milliGRAM(s) Enteral Tube every 6 hours PRN Temp greater or equal to 38C (100.4F)  acetaminophen   Tablet .. 650 milliGRAM(s) Oral every 6 hours PRN Moderate Pain (4 - 6)  dextrose 40% Gel 15 Gram(s) Oral once PRN Blood Glucose LESS THAN 70 milliGRAM(s)/deciliter  glucagon  Injectable 1 milliGRAM(s) IntraMuscular once PRN Glucose LESS THAN 70 milligrams/deciliter      OBJECTIVE:  ICU Vital Signs Last 24 Hrs  T(C): 38.3 (2019 04:00), Max: 38.9 (2019 00:00)  T(F): 101 (2019 04:00), Max: 102 (2019 00:00)  HR: 91 (2019 08:19) (84 - 102)  BP: 87/59 (2019 14:15) (87/59 - 87/59)  BP(mean): 67 (2019 14:15) (67 - 67)  ABP: 96/50 (2019 07:00) (42/30 - 120/96)  ABP(mean): 62 (2019 07:00) (34 - 106)  RR: 22 (2019 07:00) (20 - 45)  SpO2: 99% (2019 08:19) (93% - 100%)    Mode: AC/ CMV (Assist Control/ Continuous Mandatory Ventilation)  RR (machine): 22  TV (machine): 550  FiO2: 50  PEEP: 5  ITime: 1  MAP: 11  PC:   PIP: 22    Adult Advanced Hemodynamics Last 24 Hrs  CVP(mm Hg): 7 (2019 07:00) (2 - 12)  CVP(cm H2O): --  CO: --  CI: --  PA: --  PA(mean): --  PCWP: --  SVR: --  SVRI: --  PVR: --  PVRI: --  CAPILLARY BLOOD GLUCOSE      POCT Blood Glucose.: 108 mg/dL (2019 05:30)  POCT Blood Glucose.: 108 mg/dL (2019 02:04)  POCT Blood Glucose.: 81 mg/dL (2019 23:17)  POCT Blood Glucose.: 85 mg/dL (2019 17:46)  POCT Blood Glucose.: 79 mg/dL (2019 11:26)    CAPILLARY BLOOD GLUCOSE      POCT Blood Glucose.: 108 mg/dL (2019 05:30)      I&O's Summary    2019 07:01  -  2019 07:00  --------------------------------------------------------  IN: 4835.4 mL / OUT: 50 mL / NET: 4785.4 mL      Daily     Daily Weight in k.3 (2019 06:30)    PHYSICAL EXAM:       General: NAD, lyng in bed       HEENT: EOMI, Nl oropharynx, neck supple, no JVD        CVS: RRR, nl S1, S2, no murmurs, gallops, or regurg       Pulm: Lungs CTA b/l, no crackles, no wheezing, no rhonchi        GI: Nl BS, soft, nontender, nondistended, no masses       : No CVA tenderness       Ext: + Peripheral pulses, no edema, no cyanosis, no clubbing       Neuro: AOx3, no focal deficits       TELEMETRY:     EKG:     IMAGING:    LABS:  ABG - ( 2019 05:56 )  pH, Arterial: 7.28  pH, Blood: x     /  pCO2: 36    /  pO2: 105   / HCO3: 16    / Base Excess: -9.1  /  SaO2: 98                                      9.2    16.4  )-----------( 245      ( 2019 05:59 )             28.6         143  |  113<H>  |  111<H>  ----------------------------<  119<H>  5.6<H>   |  15<L>  |  3.41<H>    Ca    7.7<L>      2019 05:59  Phos  7.5       Mg     2.3         TPro  4.9<L>  /  Alb  1.2<L>  /  TBili  6.6<H>  /  DBili  x   /  AST  113<H>  /  ALT  38  /  AlkPhos  144<H>      LIVER FUNCTIONS - ( 2019 05:59 )  Alb: 1.2 g/dL / Pro: 4.9 g/dL / ALK PHOS: 144 U/L / ALT: 38 U/L / AST: 113 U/L / GGT: x           PT/INR - ( 2019 05:59 )   PT: 96.5 sec;   INR: 7.94 ratio         PTT - ( 2019 05:59 )  PTT:71.9 sec  CKMB Units: 5.4 ng/mL ( @ 00:10)  Creatine Kinase, Serum: 161 U/L ( @ 00:10)  Creatine Kinase, Serum: 150 U/L ( @ 10:32)  CKMB Units: 7.2 ng/mL ( @ 10:32)    Urinalysis Basic - ( 2019 20:40 )    Color: Altagracia / Appearance: Slightly Turbid / S.019 / pH: x  Gluc: x / Ketone: Negative  / Bili: Negative / Urobili: 2 mg/dL   Blood: x / Protein: 30 mg/dL / Nitrite: Negative   Leuk Esterase: Large / RBC: 30 /hpf / WBC >50   Sq Epi: x / Non Sq Epi: 2 / Bacteria: Many

## 2019-01-28 NOTE — CONSULT NOTE ADULT - ASSESSMENT
Mr. Vega is a 77y/o male with HTN, CHF, CAD, DM and severe symptomatic Aortic Stenosis  We have been consulted to evaluate pt for TAVR  STS 4.88%  ECHO confirms severe AS by MIRTA and cannot rule out bicuspid AV    I explained to the pt what is involved with the TAVR workup, procedure (if suitable), and post op.  I told him that in his current state it will take some time to complete the necessary tests and that we would need to optimize him medically before proceeding with the TAVR, if he is a candidate.  I answered all his questions and he understands what we discussed.
78 year old male with history of AFib on Coumadin, CAD s/p stents, valvular disorder?, HTN, HLD, DM, chronic anemia on B12 injections, who presents to the ED with PEREZ and SOB that has progressively gotten worse over the last 3 months. Nephrology consulted for new ELADIA and acidosis.
79 y/o M patient with a history of CAD with past PCI, chronic atrial fibrillation on Coumadin (patient was told by his office physician above to stop his Coumadin and go to the ER), essential HTN, aortic stenosis, reported unspecified hemoccult positive stools in the office, type 2 DM on metformin admitted on 1/15 for acute on chronic heart failure exacerbation, new ELADIA and supratherapeutic INR. He was being medically optimized for TAVR when he became hypotensive, febrile to 101 F and altered, under went a PEA arrest with ROSC and subsequently transferred to CCU for septic shock 2/2 high grade MRSA bacteremia  He does have multiple areas ok skin breakdown which could be a potential source however none of the lesions appear cellulitic. The other possibility is that he had a phlebitis, however currently all lines appear clean. It is also possible that he aspirated as a result of being obtunded so it is reasonable to cover for aspiration pneumonia in a critically ill patient.     c/w vancomycin by level  c/w Zosyn 3.375 g IV q12h   Repeat blood cx x 2 in 48h  TTE
A/P:77 y/o male with PMH of HTN, CHF, CAD s/p PCI, AFib on coumadin, DM (on metformin) and severe AS presented to his PCP with progressive dyspnea and bilateral LE edema for 3 months. Pt was admitted for acute decompensated heart failure    BLE PVD partial thickness wounds - AQUACEL  Partial thickness Scrotal wound- AQUACEL  Compression BLE  Consider JAEL/PVR, Duplex  BLE elevation  Abx per Medicine/ ID  Moisturize intact skin w/ SWEEN cream BID  con't Nutrition (as tolerated), Nutrition Consult  con't Offloading   con't Pericare  Care as per medicine will follow w/ you  Upon discharge f/u as outpatient at [facility] [address] [place] [address] [phone]  Seen w/ attng and D/w team  Thank you for this consult  Elva Lee PA-C [id number]

## 2019-01-28 NOTE — PROGRESS NOTE ADULT - PROBLEM SELECTOR PROBLEM 1
Acute on chronic systolic congestive heart failure
ELADIA (acute kidney injury)
Acute on chronic systolic congestive heart failure
Supratherapeutic INR
Acute on chronic systolic congestive heart failure

## 2019-01-28 NOTE — CONSULT NOTE ADULT - SUBJECTIVE AND OBJECTIVE BOX
Wound SURGERY CONSULT NOTE    HPI:79 y/o male with PMH of HTN, CHF, CAD s/p PCI, AFib on coumadin, DM (on metformin) and severe AS presented to his PCP with progressive dyspnea and bilateral LE edema for 3 months. Pt was admitted for acute decompensated heart failure and treated with Lasix.  Hospital course complicated by acute hypoxic respiratory failure requiring hiFlo Pt responded to diuresis and was transitioned from gtt to lasix TID. Pt also being followed by structural heart for AVR.  On , RRT was called on the floor when patient was found to be hypotensive, unarousable to sternal rub and fever. Pt placed on NRB, started on levophed gtt transferred to CCU for further management. In CCU, on  pt became hypotensive, bradycardic with AMS and then went into PEA arrest ROSC after 1 round ACS, epi x1 and atropine x1 given. Pt was intubated and required vasopressin, levo and yoselyn gtt.     Wound consult requested to assist w/ management of  scrotal & RLE  wound.  DSD placed awaiting consult. No odor, redness, warmth, pain, f/c/s noted. Drainage not managed by dressing.  (+)incontinent (+)sedentary. Offloading & pericare initiated upon admission.       PAST MEDICAL & SURGICAL HISTORY:  Prostatitis syndrome  UTI (urinary tract infection)  Diabetes  Stented coronary artery  High cholesterol  HTN (hypertension)  Afib  CAD S/p bare metal coronary artery stent      REVIEW OF SYSTEMS  Pt unable to offer    MEDICATIONS  (STANDING):  ALBUTerol/ipratropium for Nebulization 3 milliLiter(s) Nebulizer every 6 hours  artificial tears (preservative free) Ophthalmic Solution 1 Drop(s) Both EYES every 6 hours  atorvastatin 40 milliGRAM(s) Oral at bedtime  chlorhexidine 4% Liquid 1 Application(s) Topical <User Schedule>  clopidogrel Tablet 75 milliGRAM(s) Oral daily  CRRT Treatment    <Continuous>  dextrose 5%. 1000 milliLiter(s) (50 mL/Hr) IV Continuous <Continuous>  dextrose 50% Injectable 12.5 Gram(s) IV Push once  dextrose 50% Injectable 25 Gram(s) IV Push once  dextrose 50% Injectable 25 Gram(s) IV Push once  finasteride 5 milliGRAM(s) Oral daily  insulin glargine Injectable (LANTUS) 9 Unit(s) SubCutaneous at bedtime  insulin lispro (HumaLOG) corrective regimen sliding scale   SubCutaneous every 6 hours  midazolam Infusion 0.02 mG/kG/Hr (1.928 mL/Hr) IV Continuous <Continuous>  norepinephrine Infusion 1 MICROgram(s)/kG/Min (90.375 mL/Hr) IV Continuous <Continuous>  nystatin Powder 1 Application(s) Topical two times a day  pantoprazole  Injectable 40 milliGRAM(s) IV Push daily  petrolatum Ophthalmic Ointment 1 Application(s) Both EYES every 8 hours  phenylephrine    Infusion 1.5 MICROgram(s)/kG/Min (27.113 mL/Hr) IV Continuous <Continuous>  piperacillin/tazobactam IVPB. 3.375 Gram(s) IV Intermittent every 12 hours  PrismaSATE Dialysate BK 0 / 3.5 5000 milliLiter(s) (1500 mL/Hr) CRRT <Continuous>  PrismaSOL Filtration BGK 0 / 2.5 5000 milliLiter(s) (1000 mL/Hr) CRRT <Continuous>  PrismaSOL Filtration BGK 0 / 2.5 5000 milliLiter(s) (300 mL/Hr) CRRT <Continuous>  sodium bicarbonate  Infusion 0.117 mEq/kG/Hr (75 mL/Hr) IV Continuous <Continuous>  vasopressin Infusion 0.04 Unit(s)/Min (2.4 mL/Hr) IV Continuous <Continuous>    MEDICATIONS  (PRN):  acetaminophen    Suspension .. 650 milliGRAM(s) Enteral Tube every 6 hours PRN Temp greater or equal to 38C (100.4F)  acetaminophen   Tablet .. 650 milliGRAM(s) Oral every 6 hours PRN Moderate Pain (4 - 6)  dextrose 40% Gel 15 Gram(s) Oral once PRN Blood Glucose LESS THAN 70 milliGRAM(s)/deciliter  glucagon  Injectable 1 milliGRAM(s) IntraMuscular once PRN Glucose LESS THAN 70 milligrams/deciliter      Allergies    latex (Unknown)  Sulfacetamide Sodium (Unknown)      SOCIAL HISTORY:  lives alone; Denies smoking, ETOH, drugs    FAMILY HISTORY:  Family history of hypertension (Mother): hx of CVA      Vital Signs Last 24 Hrs  T(C): 38.4 (2019 08:30), Max: 38.9 (2019 00:00)  T(F): 101.1 (2019 08:30), Max: 102 (2019 00:00)  HR: 80 (2019 16:00) (80 - 102)  BP: --  BP(mean): --  RR: 25 (2019 16:00) (20 - 45)  SpO2: 96% (2019 16:00) (93% - 100%)    gaurded but stable  pressors/ sedation gtt  Obese/ frail  WD/ WN/ Disheveled  [employer] Sport    Cardiovascular: RRR     Respiratory: CTA    Gastrointestinal soft NT/ND (+)BS      Neurology  nonverbal, no follow commands    Musculoskeletal/Vascular: passive ROM  BLE edema equal  DP/PT pulses palpable  BLE equally warm/ cool  no acute ischemia noted  hemosiderin staining  no deformities/ contractures    Skin:  ANAsarca, moist    Scrotal edema- Rt scrotum w/ superficial wound- ruptured hemorrhagic blister  Rt Calf w/ partial thickness wound   (+) serosanguinous drainage  No odor, erythema, increased warmth, tenderness, induration, fluctuance    LABS:      143  |  113<H>  |  111<H>  ----------------------------<  119<H>  5.6<H>   |  15<L>  |  3.41<H>    Ca    7.7<L>      2019 05:59  Phos  7.5       Mg     2.3         TPro  4.9<L>  /  Alb  1.2<L>  /  TBili  6.6<H>  /  DBili  x   /  AST  113<H>  /  ALT  38  /  AlkPhos  144<H>                            9.2    16.4  )-----------( 245      ( 2019 05:59 )             28.6     PT/INR - ( 2019 09:21 )   PT: 116.8 sec;   INR: 9.47 ratio    PTT - ( 2019 05:59 )  PTT:71.9 sec    Urinalysis Basic - ( 2019 20:40 )    Color: Altagracia / Appearance: Slightly Turbid / S.019 / pH: x  Gluc: x / Ketone: Negative  / Bili: Negative / Urobili: 2 mg/dL   Blood: x / Protein: 30 mg/dL / Nitrite: Negative   Leuk Esterase: Large / RBC: 30 /hpf / WBC >50   Sq Epi: x / Non Sq Epi: 2 / Bacteria: Many        RADIOLOGY & ADDITIONAL STUDIES:  < from: Xray Chest 1 View AP/PA (19 @ 08:37) >  Impression:    Suboptimal study. The right hemithorax isonly partially seen on the   current study. The heart is enlarged. Left lower lobe pneumonia and/or   atelectasis. Pulmonary vascular congestion. Endotracheal tube is in good   position. NG tube is in the stomach. A central line is seen on the right   and the tip is in superior vena cava. No pneumothorax.        < from: US Testicles (19 @ 16:45) >  RIGHT:    Right testis: 2.8 x 1.6 x 2.5 cm. Normal echogenicity and echotexture   with no masses or areas of architectural distortion. Normal blood flow   pattern. Mild dilated rete testes.    Right epididymis: Within normal limits. Epididymal cyst measures 0.7 cm.   A small scrotal flores is noted.    Right hydrocele: Moderate.    Right varicocele: None.    Significant overlying scrotal edema.      LEFT:     Left testis: 2.6 x 1.3 x 1.5 cm. Normal echogenicity and echotexture with   no masses or areas of architectural distortion. Normal blood flow   pattern. Dilated rete testes.    Left epididymis: Within normal limits.    Left hydrocele: Small.    Left varicocele: Present.    Significant overlying scrotal edema.    IMPRESSION:     Significant scrotal edema with moderate right and small left hydrocele.   No focal collection.    Left varicocele is present.      Cultures:    Culture - Blood (19 @ 05:58)    Gram Stain:   Growth in aerobic bottle: Gram Positive Cocci in Clusters  Growth in anaerobic bottle: Gram Positive Cocci in Clusters    Specimen Source: .Blood Blood-Peripheral    Culture Results:   Growth in aerobic bottle: Gram Positive Cocci in Clusters  Growth in anaerobic bottle: Gram Positive Cocci in Clusters    Culture - Blood (19 @ 11:31)    Gram Stain:   Growth in aerobic bottle: Gram Positive Cocci in Clusters  Growth in anaerobic bottle: Gram Positive Cocci in Clusters    -  Methicillin resistant Staphylococcus aureus (MRSA): Detec    Specimen Source: .Blood Blood-Venous    Organism: Blood Culture PCR    Culture Results:   Growth in aerobic and anaerobic bottles: Staphylococcus aureus  See previous culture [id number]  "Due to technical problems, Proteus sp. will Not be reported as part of  the BCID panel until further notice"  ***Blood Panel PCR results on this specimen are available  approximately 3 hours after the Gram stain result.***  Gram stain, PCR, and/or culture results may not always  correspond due to difference in methodologies.  ************************************************************  This PCR assay was performed using [employer].  The following targets are tested for: Enterococcus,  vancomycin resistant enterococci, Listeria monocytogenes,  coagulase negative staphylococci, S. aureus,  methicillin resistant S. aureus, Streptococcus agalactiae  (Group B), S. pneumoniae, S. pyogenes (Group A),  Acinetobacter baumannii, Enterobacter cloacae, E. coli,  Klebsiella oxytoca, K. pneumoniae, Proteus sp.,  Serratia marcescens, Haemophilus influenzae,  Neisseria meningitidis, Pseudomonas aeruginosa, Candida  albicans, C. glabrata, C krusei, C parapsilosis,  C. tropicalis and the KPC resistance gene.    Organism Identification: Blood Culture PCR    Method Type: PCR

## 2019-01-29 VITALS — HEART RATE: 44 BPM | OXYGEN SATURATION: 62 %

## 2019-01-29 LAB
CULTURE RESULTS: SIGNIFICANT CHANGE UP
CULTURE RESULTS: SIGNIFICANT CHANGE UP
GRAM STN FLD: SIGNIFICANT CHANGE UP
GRAM STN FLD: SIGNIFICANT CHANGE UP
SPECIMEN SOURCE: SIGNIFICANT CHANGE UP

## 2019-01-29 PROCEDURE — 93306 TTE W/DOPPLER COMPLETE: CPT

## 2019-01-29 PROCEDURE — 84132 ASSAY OF SERUM POTASSIUM: CPT

## 2019-01-29 PROCEDURE — 84484 ASSAY OF TROPONIN QUANT: CPT

## 2019-01-29 PROCEDURE — 85610 PROTHROMBIN TIME: CPT

## 2019-01-29 PROCEDURE — 97116 GAIT TRAINING THERAPY: CPT

## 2019-01-29 PROCEDURE — 97530 THERAPEUTIC ACTIVITIES: CPT

## 2019-01-29 PROCEDURE — 97162 PT EVAL MOD COMPLEX 30 MIN: CPT

## 2019-01-29 PROCEDURE — 82550 ASSAY OF CK (CPK): CPT

## 2019-01-29 PROCEDURE — 80053 COMPREHEN METABOLIC PANEL: CPT

## 2019-01-29 PROCEDURE — 82962 GLUCOSE BLOOD TEST: CPT

## 2019-01-29 PROCEDURE — 97110 THERAPEUTIC EXERCISES: CPT

## 2019-01-29 PROCEDURE — 87798 DETECT AGENT NOS DNA AMP: CPT

## 2019-01-29 PROCEDURE — 82330 ASSAY OF CALCIUM: CPT

## 2019-01-29 PROCEDURE — 87186 SC STD MICRODIL/AGAR DIL: CPT

## 2019-01-29 PROCEDURE — 85730 THROMBOPLASTIN TIME PARTIAL: CPT

## 2019-01-29 PROCEDURE — 80048 BASIC METABOLIC PNL TOTAL CA: CPT

## 2019-01-29 PROCEDURE — 87486 CHLMYD PNEUM DNA AMP PROBE: CPT

## 2019-01-29 PROCEDURE — 76870 US EXAM SCROTUM: CPT

## 2019-01-29 PROCEDURE — 71045 X-RAY EXAM CHEST 1 VIEW: CPT

## 2019-01-29 PROCEDURE — 85027 COMPLETE CBC AUTOMATED: CPT

## 2019-01-29 PROCEDURE — 84100 ASSAY OF PHOSPHORUS: CPT

## 2019-01-29 PROCEDURE — 70450 CT HEAD/BRAIN W/O DYE: CPT

## 2019-01-29 PROCEDURE — 93005 ELECTROCARDIOGRAM TRACING: CPT

## 2019-01-29 PROCEDURE — 84300 ASSAY OF URINE SODIUM: CPT

## 2019-01-29 PROCEDURE — 93880 EXTRACRANIAL BILAT STUDY: CPT

## 2019-01-29 PROCEDURE — 87581 M.PNEUMON DNA AMP PROBE: CPT

## 2019-01-29 PROCEDURE — 87040 BLOOD CULTURE FOR BACTERIA: CPT

## 2019-01-29 PROCEDURE — 82553 CREATINE MB FRACTION: CPT

## 2019-01-29 PROCEDURE — 82803 BLOOD GASES ANY COMBINATION: CPT

## 2019-01-29 PROCEDURE — 83930 ASSAY OF BLOOD OSMOLALITY: CPT

## 2019-01-29 PROCEDURE — 71250 CT THORAX DX C-: CPT

## 2019-01-29 PROCEDURE — 83036 HEMOGLOBIN GLYCOSYLATED A1C: CPT

## 2019-01-29 PROCEDURE — 84295 ASSAY OF SERUM SODIUM: CPT

## 2019-01-29 PROCEDURE — 36600 WITHDRAWAL OF ARTERIAL BLOOD: CPT

## 2019-01-29 PROCEDURE — 81001 URINALYSIS AUTO W/SCOPE: CPT

## 2019-01-29 PROCEDURE — 99291 CRITICAL CARE FIRST HOUR: CPT

## 2019-01-29 PROCEDURE — 84156 ASSAY OF PROTEIN URINE: CPT

## 2019-01-29 PROCEDURE — 93975 VASCULAR STUDY: CPT

## 2019-01-29 PROCEDURE — 83605 ASSAY OF LACTIC ACID: CPT

## 2019-01-29 PROCEDURE — 85014 HEMATOCRIT: CPT

## 2019-01-29 PROCEDURE — 83935 ASSAY OF URINE OSMOLALITY: CPT

## 2019-01-29 PROCEDURE — 76770 US EXAM ABDO BACK WALL COMP: CPT

## 2019-01-29 PROCEDURE — 83735 ASSAY OF MAGNESIUM: CPT

## 2019-01-29 PROCEDURE — 82947 ASSAY GLUCOSE BLOOD QUANT: CPT

## 2019-01-29 PROCEDURE — 75572 CT HRT W/3D IMAGE: CPT

## 2019-01-29 PROCEDURE — 82043 UR ALBUMIN QUANTITATIVE: CPT

## 2019-01-29 PROCEDURE — 80202 ASSAY OF VANCOMYCIN: CPT

## 2019-01-29 PROCEDURE — 75574 CT ANGIO HRT W/3D IMAGE: CPT

## 2019-01-29 PROCEDURE — 82570 ASSAY OF URINE CREATININE: CPT

## 2019-01-29 PROCEDURE — 94010 BREATHING CAPACITY TEST: CPT

## 2019-01-29 PROCEDURE — 80074 ACUTE HEPATITIS PANEL: CPT

## 2019-01-29 PROCEDURE — 80076 HEPATIC FUNCTION PANEL: CPT

## 2019-01-29 PROCEDURE — 82435 ASSAY OF BLOOD CHLORIDE: CPT

## 2019-01-29 PROCEDURE — 87633 RESP VIRUS 12-25 TARGETS: CPT

## 2019-01-29 PROCEDURE — 94003 VENT MGMT INPAT SUBQ DAY: CPT

## 2019-01-29 PROCEDURE — 83880 ASSAY OF NATRIURETIC PEPTIDE: CPT

## 2019-01-29 PROCEDURE — 87150 DNA/RNA AMPLIFIED PROBE: CPT

## 2019-01-29 PROCEDURE — 76700 US EXAM ABDOM COMPLETE: CPT

## 2019-01-29 PROCEDURE — 94640 AIRWAY INHALATION TREATMENT: CPT

## 2019-01-29 PROCEDURE — 99285 EMERGENCY DEPT VISIT HI MDM: CPT | Mod: 25

## 2019-01-29 RX ORDER — EPINEPHRINE 0.3 MG/.3ML
0.1 INJECTION INTRAMUSCULAR; SUBCUTANEOUS
Qty: 8 | Refills: 0 | Status: DISCONTINUED | OUTPATIENT
Start: 2019-01-29 | End: 2019-01-29

## 2019-01-29 RX ADMIN — MORPHINE SULFATE 2 MILLIGRAM(S): 50 CAPSULE, EXTENDED RELEASE ORAL at 04:00

## 2019-01-29 RX ADMIN — PIPERACILLIN AND TAZOBACTAM 25 GRAM(S): 4; .5 INJECTION, POWDER, LYOPHILIZED, FOR SOLUTION INTRAVENOUS at 06:15

## 2019-01-29 RX ADMIN — Medication 1 APPLICATION(S): at 06:19

## 2019-01-29 RX ADMIN — NYSTATIN CREAM 1 APPLICATION(S): 100000 CREAM TOPICAL at 06:18

## 2019-01-29 RX ADMIN — CHLORHEXIDINE GLUCONATE 1 APPLICATION(S): 213 SOLUTION TOPICAL at 06:22

## 2019-01-29 RX ADMIN — MORPHINE SULFATE 2 MILLIGRAM(S): 50 CAPSULE, EXTENDED RELEASE ORAL at 03:47

## 2019-01-29 RX ADMIN — Medication 3 MILLILITER(S): at 06:14

## 2019-01-29 RX ADMIN — Medication 1 DROP(S): at 06:18

## 2019-01-29 RX ADMIN — Medication 3 MILLILITER(S): at 00:58

## 2019-01-29 NOTE — PROGRESS NOTE ADULT - REASON FOR ADMISSION
3 months of progressive B/L LE oedema and dyspnoea on exertion.

## 2019-01-29 NOTE — PROGRESS NOTE ADULT - SUBJECTIVE AND OBJECTIVE BOX
PATIENT:  JOSUÉ RAMIREZ  [id number]    INTERVAL HISTORY: Maxed out on 4 pressors over night and HCO3 gtt in setting of multiorgan failure. Detailed discussion with brother regarding overall prognosis was held o/n- he requested artificial methods, including pressors) keeping his brother alive be turned off.        MEDICATIONS  (STANDING):  ALBUTerol/ipratropium for Nebulization 3 milliLiter(s) Nebulizer every 6 hours  artificial tears (preservative free) Ophthalmic Solution 1 Drop(s) Both EYES every 6 hours  atorvastatin 40 milliGRAM(s) Oral at bedtime  chlorhexidine 4% Liquid 1 Application(s) Topical <User Schedule>  clopidogrel Tablet 75 milliGRAM(s) Oral daily  CRRT Treatment    <Continuous>  dextrose 5%. 1000 milliLiter(s) (50 mL/Hr) IV Continuous <Continuous>  dextrose 50% Injectable 12.5 Gram(s) IV Push once  dextrose 50% Injectable 25 Gram(s) IV Push once  dextrose 50% Injectable 25 Gram(s) IV Push once  finasteride 5 milliGRAM(s) Oral daily  insulin glargine Injectable (LANTUS) 9 Unit(s) SubCutaneous at bedtime  insulin lispro (HumaLOG) corrective regimen sliding scale   SubCutaneous every 6 hours  midazolam Infusion 0.02 mG/kG/Hr (1.928 mL/Hr) IV Continuous <Continuous>  nystatin Powder 1 Application(s) Topical two times a day  pantoprazole  Injectable 40 milliGRAM(s) IV Push daily  petrolatum Ophthalmic Ointment 1 Application(s) Both EYES every 8 hours  piperacillin/tazobactam IVPB. 3.375 Gram(s) IV Intermittent every 12 hours  PrismaSATE Dialysate BK 0 / 3.5 5000 milliLiter(s) (1500 mL/Hr) CRRT <Continuous>  PrismaSOL Filtration BGK 0 / 2.5 5000 milliLiter(s) (1000 mL/Hr) CRRT <Continuous>  PrismaSOL Filtration BGK 0 / 2.5 5000 milliLiter(s) (300 mL/Hr) CRRT <Continuous>  sodium bicarbonate  Infusion 0.195 mEq/kG/Hr (125 mL/Hr) IV Continuous <Continuous>    MEDICATIONS  (PRN):  acetaminophen    Suspension .. 650 milliGRAM(s) Enteral Tube every 6 hours PRN Temp greater or equal to 38C (100.4F)  acetaminophen   Tablet .. 650 milliGRAM(s) Oral every 6 hours PRN Moderate Pain (4 - 6)  dextrose 40% Gel 15 Gram(s) Oral once PRN Blood Glucose LESS THAN 70 milliGRAM(s)/deciliter  glucagon  Injectable 1 milliGRAM(s) IntraMuscular once PRN Glucose LESS THAN 70 milligrams/deciliter  morphine  - Injectable 2 milliGRAM(s) IV Push every 4 hours PRN Dyspnea      OBJECTIVE:  ICU Vital Signs Last 24 Hrs  T(C): 38.1 (28 Jan 2019 17:00), Max: 38.4 (28 Jan 2019 08:30)  T(F): 100.5 (28 Jan 2019 17:00), Max: 101.1 (28 Jan 2019 08:30)  HR: 38 (29 Jan 2019 06:15) (34 - 92)  BP: --  BP(mean): --  ABP: 34/18 (29 Jan 2019 06:15) (34/16 - 98/56)  ABP(mean): 24 (29 Jan 2019 06:15) (24 - 72)  RR: 22 (29 Jan 2019 06:15) (22 - 48)  SpO2: 56% (29 Jan 2019 06:15) (54% - 100%)    Mode: AC/ CMV (Assist Control/ Continuous Mandatory Ventilation)  RR (machine): 22  TV (machine): 550  FiO2: 50  PEEP: 0.5  ITime: 1  MAP: 11  PIP: 22    Adult Advanced Hemodynamics Last 24 Hrs  CVP(mm Hg): 5 (29 Jan 2019 06:15) (3 - 15)  CVP(cm H2O): --  CO: --  CI: --  PA: --  PA(mean): --  PCWP: --  SVR: --  SVRI: --  PVR: --  PVRI: --  CAPILLARY BLOOD GLUCOSE      POCT Blood Glucose.: 88 mg/dL (28 Jan 2019 23:11)  POCT Blood Glucose.: 92 mg/dL (28 Jan 2019 23:06)  POCT Blood Glucose.: 33 mg/dL (28 Jan 2019 23:04)  POCT Blood Glucose.: 79 mg/dL (28 Jan 2019 17:52)  POCT Blood Glucose.: 119 mg/dL (28 Jan 2019 12:21)    CAPILLARY BLOOD GLUCOSE      POCT Blood Glucose.: 88 mg/dL (28 Jan 2019 23:11)      I&O's Summary    28 Jan 2019 07:01  -  29 Jan 2019 07:00  --------------------------------------------------------  IN: 44957.7 mL / OUT: 5 mL / NET: 51358.7 mL      Daily     Daily     PHYSICAL EXAM:       General: Unresponsive       HEENT: dry mucous membranes       CVS: distant heart sounds       Pulm: coarse breath sounds b/l       GI: Nl BS, soft, nontender, nondistended, no masses       Ext: faint peripheral pulses, cyanotic appearing  	            TELEMETRY:     EKG:     IMAGING:    LABS:  ABG - ( 28 Jan 2019 18:21 )  pH, Arterial: 7.08  pH, Blood: x     /  pCO2: 39    /  pO2: 190   / HCO3: 11    / Base Excess: -17.6 /  SaO2: 98                                      9.2    16.4  )-----------( 245      ( 28 Jan 2019 05:59 )             28.6     01-28    143  |  113<H>  |  111<H>  ----------------------------<  119<H>  5.6<H>   |  15<L>  |  3.41<H>    Ca    7.7<L>      28 Jan 2019 05:59  Phos  7.5     01-28  Mg     2.3     01-28    TPro  4.9<L>  /  Alb  1.2<L>  /  TBili  6.6<H>  /  DBili  x   /  AST  113<H>  /  ALT  38  /  AlkPhos  144<H>  01-28    LIVER FUNCTIONS - ( 28 Jan 2019 05:59 )  Alb: 1.2 g/dL / Pro: 4.9 g/dL / ALK PHOS: 144 U/L / ALT: 38 U/L / AST: 113 U/L / GGT: x           PT/INR - ( 28 Jan 2019 09:21 )   PT: 116.8 sec;   INR: 9.47 ratio         PTT - ( 28 Jan 2019 05:59 )  PTT:71.9 sec

## 2019-01-29 NOTE — PROGRESS NOTE ADULT - ASSESSMENT
78M w/ PMHx of CAD (s/p DAYANA to mLAD and POBA to D2 5/2017), AF (CHADS2-VASC:5, on Warfarin), DM2, HLD, HTN who was admitted on 1/15 w/ HF symptoms found to have severe AS (MIRTA:0.8, peak:71 mean:38(, mild global LVSD, mod MR, severe pHTN and RV dysfunction. He is being diuresed and worked up for a TAVR.     #Severe AS   - evaluation per Structural Heart Team   - Appreciate renal recs; will proceed with CTA and cath spaced 72 hours apart    #Acute Valvular HF and RV dysfunction (his LV function is only mildly reduced)  -Cr continues to improve, okay with Lasix 80 mg IV TID  -Monitor BMP BID, replete K to goal >4 and Mg >2  -Needs daily standing weights and strict I/Os  -Continue Tele  -No B-blocker 2/2 slow AF rates, No ACEI/ARB 2/2 ELADIA    #CAD s/p DAYANA  -D/C ASA 81 - given that the PCI was in 2017 would prefer to avoid triple therapy in this 77 y/o M  -c/w Plavix 75  -c/w Lipitor 40  -No B-blocker or ACEI/ARB as above    #AFib  - CHADS2-VASc - 6  - Continue heparin gtt    #HTN  -c/w Norvasc 5    LEFTY Mckeon  Cardiology Fellow  [id number]
#Neuro  - Sedation off @530 AM  - No purposeful movements. Will cont to monitor    #Pulm  Acute respiratory failure  - ETT to MV, PRVC mode 520/5/22/50%, lip line readjusted to 25cm  -Wean as tolerated in AM  -Trend ABG, CXR pending    #Cardiac  s/p cardiac arrest   -Titrate levo, vasopressin, and yoselyn gtt as tolerated  -TTE in the AM    Acute decompensated HF  -Holding lasix due to hypotension  -Strict I+O, daily weights    CAD  -Continue DAPT, statin    Severe AS  -Fluids PRN, CVP goal 6-8  -Monitor UOP  -f/u structural heart for TAVR eval    #Renal   ELADIA   -Trend SCr, holding diuretics  -Monitor urine output  -Dose meds renally, avoid nephrotoxic meds  - CVVH today    #Endo  -Continue lantus 9 units  -ISS     #ID   Sepsis  -(+) blood cultures on 1/27 with gram positive cocci in clusters  -Daily Blood cultures  -Continue zosyn 3.375 (started 1/26)  -Continue vanco 1g (started 1/26), dose by level  -Consult ID
77 y/o male with PMH of HTN, CHF, CAD s/p PCI, AFib on coumadin, DM (on metformin) and severe AS presented to his PCP with progressive dyspnea and bilateral LE edema for 3 months. Pt was admitted for acute decompensated heart failure and treated with Lasix.  Hospital course complicated by acute hypoxic respiratory failure requiring hiFlo Pt responded to diuresis and was transitioned from gtt to lasix TID. Pt also being followed by structural heart for AVR.  On 1/26, RRT was called on the floor when patient was found to be hypotensive, unarousable to sternal rub and fever. Pt placed on NRB, started on levophed gtt transferred to CCU for further management. In CCU, on 1/26 pt became hypotensive, bradycardic with AMS and then went into PEA arrest ROSC after 1 round ACS, epi x1 and atropine x1 given. Pt was intubated and required vasopressin, levo and yoselyn gtt.     #Neuro  - Off sedation with no purposeful movements.     #Pulm  Acute respiratory failure  - ETT to MV, PRVC mode 520/5/22/100%    #Cardiac  s/p cardiac arrest   - Off vasopressors per family request      Severe AS  -Fluids PRN, CVP goal 6-8  -Monitor UOP    #Renal   - Anuric renal failure- family opting against CVVH  - Severe metabolic acidosis w/ lactate >8     #Endo  - Monitor FSG's    #ID   Sepsis  -(+) blood cultures on 1/27 with gram positive cocci in clusters  -Daily Blood cultures  -Continue zosyn 3.375 (started 1/26)  -Continue vanco 1g (started 1/26), dose by level    Dispo  - Pt is actively dying- brother was contacted last night and requested all pressors be removed and to allow pt to pass away peacefully. Will begin Morphine IVP to assist in pt comfort during this process
77 yo m with h/o AS, CAD, PCI, afib on coumadin HTN, presented with epistasix, edema, supratherapeutic INR>
78 year old male with history of AFib on Coumadin, CAD s/p stents, valvular disorder?, HTN, HLD, DM, chronic anemia on B12 injections, who presents to the ED with PEREZ and SOB that has progressively gotten worse over the last 3 months. Nephrology consulted for new ELADIA and acidosis.
78 year old male with history of AFib on Coumadin, CAD s/p stents, valvular disorder?, HTN, HLD, DM, chronic anemia on B12 injections, who presents to the ED with PEREZ and SOB that has progressively gotten worse over the last 3 months. Nephrology consulted for new ELADIA and acidosis. Creatinine resolving with diuresis.
78 year old male with history of AFib on Coumadin, CAD s/p stents, valvular disorder?, HTN, HLD, DM, chronic anemia on B12 injections, who presents to the ED with PEREZ and SOB that has progressively gotten worse over the last 3 months. Nephrology consulted for new ELADIA and acidosis. Patient now in likely septic/cardiogenic shock and will require close monitoring in CCU. ELADIA worsening.
78M w/ PMHx of CAD (s/p DAYANA to mLAD and POBA to D2 5/2017), AF (CHADS2-VASC:5, on Warfarin), DM2, HLD, HTN who  was admitted on 1/15 w/ HF symptoms found to have severe AS (MIRTA:0.8, peak:71 mean:38(, mild global LVSD, mod MR, severe pHTN and RV dysfunction. He is currently on a Lasix gtt and is being worked up for a TAVR.     Plan:    1) Severe AS - evaluation per Structural Heart Team - Cr will likely prohibit LHC unless Cr continues to downtrend    2) Acute Valvular HF and RV dysfunction (his LV function is only mildly reduced)  -c/w Lasix gtt @ 10  -Needs daily standing weights (no weight documented today) and strict I/Os  -Continue Tele  -No B-blocker 2/2 slow AF rates, No ACEI/ARB 2/2 ELADIA    3) CAD s/p DAYANA  -D/C ASA 81 - give that the PCI was in 2017 would prefer to avoid triple therapy in this 79 y/o M  -c/w Plavix 75  -c/w Lipitor 40  -No B-blocker or ACEI/ARB as above    4) AFib  - CHADS2-VASc - 6  - Continue warfarin, INR goal 2-3    5) HTN  -c/w Norvasc 5    General Cardiology will continue to follow    LEFTY Ibanez  Cardiology Fellow  (p): [phone]
78M w/ PMHx of CAD (s/p DAYANA to mLAD and POBA to D2 5/2017), AF (CHADS2-VASC:5, on Warfarin), DM2, HLD, HTN who was admitted on 1/15 w/ HF symptoms found to have severe AS (MIRTA:0.8, peak:71 mean:38(, mild global LVSD, mod MR, severe pHTN and RV dysfunction. He is being diuresed and worked up for a TAVR.     #Severe AS   - evaluation per Structural Heart Team   - Appreciate renal recs  - S/p cardiac CT on 1/24, if Cr remains stable will plan on [facility] next week    #Acute Valvular HF and RV dysfunction (his LV function is only mildly reduced)  -Cr continues to improve, continue Lasix 80 mg IV TID  -Monitor BMP BID, replete K to goal >4 and Mg >2  -Needs daily standing weights and strict I/Os  -Continue Tele  -No B-blocker 2/2 slow AF rates, No ACEI/ARB 2/2 ELADIA    #CAD s/p DAYANA  -D/C ASA 81 - given that the PCI was in 2017 would prefer to avoid triple therapy in this 79 y/o M  -c/w Plavix 75  -c/w Lipitor 40  -No B-blocker or ACEI/ARB as above    #AFib  - CHADS2-VASc - 6  - Holding heparin gtt for supratherapeutic INR    #HTN  -c/w Norvasc 5    #Supratherapeutic INR  - INR trending up, INR 7.68 today  - W/u per primary     LEFTY Mckeon  Cardiology Fellow  [id number]
78M w/ PMHx of CAD (s/p DAYANA to mLAD and POBA to D2 5/2017), AF (CHADS2-VASC:5, on Warfarin), DM2, HLD, HTN who was admitted on 1/15 w/ HF symptoms found to have severe AS (MIRTA:0.8, peak:71 mean:38(, mild global LVSD, mod MR, severe pHTN and RV dysfunction. He is currently on a Lasix gtt and is being worked up for a TAVR.     #Severe AS   - evaluation per Structural Heart Team   - Cr will likely prohibit LHC unless Cr continues to downtrend    #Acute Valvular HF and RV dysfunction (his LV function is only mildly reduced)  -Cr continues to improve, c/w Lasix gtt @ 10 mg/hr  -Monitor BMP BID while on Lasix gtt, replete K to goal >4 and Mg >2  -Needs daily standing weights and strict I/Os  -Continue Tele  -No B-blocker 2/2 slow AF rates, No ACEI/ARB 2/2 ELADIA  -Appreciate renal recs; when Cr stable and less volume overloaded, will proceed with CTA and cath spaced 72 hours apart    #CAD s/p DAYANA  -D/C ASA 81 - give that the PCI was in 2017 would prefer to avoid triple therapy in this 77 y/o M  -c/w Plavix 75  -c/w Lipitor 40  -No B-blocker or ACEI/ARB as above    #AFib  - CHADS2-VASc - 6  - Continue warfarin, INR goal 2-3    #HTN  -c/w Norvasc 5    LEFTY Mckeon  Cardiology Fellow  [id number]
78M w/ PMHx of CAD (s/p DAYANA to mLAD and POBA to D2 5/2017), AF (CHADS2-VASC:5, on Warfarin), DM2, HLD, HTN who was admitted on 1/15 w/ HF symptoms found to have severe AS (MIRTA:0.8, peak:71 mean:38(, mild global LVSD, mod MR, severe pHTN and RV dysfunction. He is currently on a Lasix gtt and is being worked up for a TAVR.     #Severe AS   - evaluation per Structural Heart Team   - Cr will likely prohibit LHC unless Cr continues to downtrend    #Acute Valvular HF and RV dysfunction (his LV function is only mildly reduced)  -c/w Lasix gtt @ 10  -Needs daily standing weightsand strict I/Os  -Continue Tele  -No B-blocker 2/2 slow AF rates, No ACEI/ARB 2/2 ELADIA    #CAD s/p DAYANA  -D/C ASA 81 - give that the PCI was in 2017 would prefer to avoid triple therapy in this 79 y/o M  -c/w Plavix 75  -c/w Lipitor 40  -No B-blocker or ACEI/ARB as above    #AFib  - CHADS2-VASc - 6  - Continue warfarin, INR goal 2-3    #HTN  -c/w Norvasc 5    LEFTY Mckeon  Cardiology Fellow  [id number]
78M with PMH of CAD s/p DAYANA to mLAD and POBA to D2 5/2017 with Dr. Harris, AFib on warfarin, T2DM, HLD, HTN who presented from home with worsening PEREZ and LE edema x3 months. TTE revealed EF 45%, severe AS, mild global LV dysfunction and severe pHTN. He is being diuresed with IV Lasix, but continues to have anasarca. Cr 3.7 on admission, now trending down. Nephrology following for ELADIA.     #Severe AS  #Acute on chronic HFrEF  - Continue diuresis with Lasix, Cr is improving  - Patient needs to be diuresed more before proceeding with further w/u   - Will ask structural team to evaluate    #CAD s/p DAYANA  - Continue DAPT, statin	    #AFib  - [id number]: 6  - INR 7.6 on 1/16  - Continue warfarin, INR goal 2-3    LEFTY Mckeon  Cardiology Fellow   [id number]
79 yo m with h/o AS, CAD, PCI, afib on coumadin HTN, presented with epistasix, edema, supratherapeutic INR>
Mr. Vega is a 77y/o male with HTN, CHF, CAD s/p PCI, AFib on Coumadin, DM and severe symptomatic Aortic Stenosis admitted from home by PMD with supratherapeutic INR with epistaxis, and acute on chronic CHF exacerbation class III. Undergoing evaluation for TAVR.
Mr. Vega is a 79y/o male with HTN, CHF, CAD s/p PCI, AFib on Coumadin, DM and severe symptomatic Aortic Stenosis admitted from home by PMD with supratherapeutic INR with epistaxis, and acute on chronic CHF exacerbation class III. Undergoing evaluation for TAVR.
79 yo m with h/o AS, CAD, PCI, afib on coumadin HTN, presented with epistasix, edema, supratherapeutic INR>
77 YO M w/ severe AS c/b septic shock and PEA arrest
77 yo m with h/o AS, CAD, PCI, afib on coumadin HTN, presented with epistasix, edema, supratherapeutic INR>

## 2019-01-29 NOTE — DISCHARGE NOTE FOR THE EXPIRED PATIENT - HOSPITAL COURSE
77 y/o M patient with a history of HTN, CHF, CAD s/p PCI, AFib on Coumadin, DM and severe symptomatic Aortic Stenosis, reported unspecified hemoccult positive stools in the office, type 2 DM on metformin, with the patient self referring to [place] directed by his office physician following an elevated INR and episode of epistaxis this AM following apparently 3 months of progressive dyspnoea and B/L LE oedema with poor aerobic functional status with dyspnoea with changing his clothes or ambulation to the BR.   Patient denies chest pain/pressure.  NO dyspnoea at present.  NO headache, no focal weakness.   No abdominal pain, no red blood per rectum or melena.  No back pain, no tearing back pain.   NO dysuria, no haematuria.   Denies weight loss or anorexia.  No rash.   Notes 3-4 months of persistent scrotal oedema.  No penile pain, no scrotal pain.  No leg pain.  Remaining review of systems not contributory.    The patient was admitted to the hospital for acute decompensated heart failure and begun on lasix diuresis. Complicated by acute hypoxic respiratory failure.  Required HiFlo / NC for oxygenation on floor, no hx of home O2. Patient underwent significant diuresis on flood with improvement on edema. Patient was followed by nephrology due to ELADIA. Patient had improvement in overall clinical status - impraoved breathing, edema. Transitioned off lasix drip to lasix TID. ELADIA slowly improved. Eval'd by [facility] for AVR, underwent carotid duplex and CT heart. On , episode of HYPOtension to 78/50, patient endorsed dizziness. Suspected in context of over-diuresis, was given gentle hydration, patient improved.     Morning of 19: RRT on floor, patient noted to be hypotensive, unarousable to sternal rub, febrile. Begun on NC due to SpO2 of 91% on RA,  levo started. EKG without acute ischemic changes. Transferred to CCU for further care.     - Pt became acutely hypotensive 50s systolic & unresponsive with PEA arrest. S/p ACLS w/ ROSC after 1 epi & 1 mg . Unable to protect airway, intubated by anesthesia, likely aspirated as copious thick, dark material suctioned. Bcx grew MRSA for which pt was maintained on Vancomycin and Zosyn. Pt became progressively more hypotensive likely in setting of septic shock 2/2 MRSA bacteremia requiring 3 vasopressors to maintain MAP 50. He became exceedingly acidotic and lab values showed evidence of multiorgan failure. Pt brother was made aware of the poor prognosis and requested pt be made DNR/DNI. Pt  2019 @ 8:31 AM. Family notified and will come to the hospital. Emotional support provided.

## 2019-01-29 NOTE — CHART NOTE - NSCHARTNOTEFT_GEN_A_CORE
At 5:26 am, patient's BP noted to be 38/19 and HR 34 on maximal 4 pressors (Kapil, Levo, Vaso, Epi) and Bicarb gtt.  Exam showing signs of malperfusion to extremities. At this point continue care is considered to be futile.   Called brother Kev (closest kin we are able to reach) to relay this message and explain that medications are artificially and inappropriately prolonging life  With this understanding, pressor medications were held  Conversation witnessed by NP Yahaira Parsons At 5:26 am, patient's BP noted to be 38/19 and HR 34 on maximal 4 pressors (Kapil, Levo, Vaso, Epi) and Bicarb gtt.  Exam showing signs of malperfusion to extremities. At this point continue care is considered to be futile.   Called brother Kev (closest kin we are able to reach) to relay this message and explain that medications are artificially and inappropriately prolonging life  With this understanding, pressor medications were held  Conversation witnessed by PRATIK Parsons

## 2019-01-29 NOTE — PROGRESS NOTE ADULT - PROVIDER SPECIALTY LIST ADULT
CCU
Cardiology
Hospitalist
Hospitalist
Internal Medicine
Nephrology
Structural Heart
Cardiology
Hospitalist

## 2019-06-14 NOTE — CONSULT NOTE ADULT - ATTENDING COMMENTS
78 year old man with acute decompensated heart failure, severely volume overloaded with anasarca and acute kidney injury (had normal creatinine 2 years ago). Has murmur consistent with severe AS. Echo confirms critical AS. Thus need to diurese the large volume overload then evaluate for aortic valve replacement.
79 yo male with history as noted   Currently intubated, non responsive  Scrotal  and leg wound are related to fluid overload, anasarca  On pressor  Wound care rendered  Remain available
79 yo M--patient with a history of CAD with past PCI, chronic atrial fibrillation on Coumadin, unspecified cardiac valve disorder, type 2 DM on metformin, with the patient self referring to [place] directed by his office physician following an elevated INR and episode of epistaxis. In hospital for heart failure.  Developed fever on 1/26; leukocytosis  Episode of cardiac arrest--aspiration risk  On 3 pressors, critically ill in CCU, anuric--guarded/poor prognosis  Unclear source--multiple wounds, but do not appear infected  Overall, fever, leukocytosis, severe sepsis, ELADIA, MRSA bacteremia, aspiration pna  - Vancomycin by level  - Zosyn 3.375g q 12  - BCX remain positive; repeat BCXs q 48 until clear  - Check TTE  - Further workup depending on focal findings/symptoms and GOC (considerations for CT's, imaging to spine, PRABHAKAR...)  - GOC discussion per CCU  - Discussed case with CCU team    Michael Grewal MD  Pager [phone]  After 5pm and on weekends call [phone]    I was physically present for the key portions of the evaluation and management service provided. I saw and examined the patient. I agree with the above history, physical, and plan except for any discrepancies which I have documented in “Attending Attestation.” Please refer to “Attending Attestation” for final plan.
pt. seen with the fellow on 1/16/19.     ELADIA: Still with elevated Scr, in the setting of cardiorenal syndrome. rule out obstruction. Check PVR, renal US. UA dirty, repeat UA and check urine culture. check spot urine TP/CR.   Hypervolemia: consider repeat echo. continue diuresis 80 IV BID. Monitor weight. salt and fluid restriction.  HTN: Bp stable, monitor.  Metabolic acidosis: with resp compensation, continue to monitor serum CO2 and ABG.
RASH

## 2020-01-01 NOTE — PROGRESS NOTE ADULT - PROBLEM SELECTOR PLAN 3
Likely secondary to CHF exacerbation  - Patient with L and R sided heart failure with severe AS by valve size and dilated LA along with severe pHTN likely secondary to heart failure. Will likely require intervention for valve and continued diuresis to help overall functional status and for resolution of kidney function  -continue lasix drip for now, appreciate TAVR eval.  -once crt stable and less volume overlaoded, can get CTA and cath spaced 72 hours apart EOAE (evoked otoacoustic emission)

## 2021-04-06 NOTE — PATIENT PROFILE ADULT - NSASFALLNEEDSASSISTWITH_GEN_A_NUR
Progress Note - Behavioral Health   Timothy Matt 29 y o  male MRN: [id number]  Unit/Bed#: [facility] 251-02 Encounter: [id number]    Assessment/Plan   Principal Problem:    Schizoaffective disorder, [facility] ([facility] )  Active Problems:    Medical clearance for psychiatric admission    Right-sided chest wall pain    Tobacco abuse    Hallucinations    Emphysematous bleb of lung ([facility] )    Constipation      Subjective: Patient was seen, chart reviewed and case discussed with team  As per report patient received oral PRN once yesterday  He has been sleeping in bed so far today  He did woke up for lunch  Patient has irritable edge at times  Denies SI/HI/AH/VH  He is compliant with medications  Denies any side effects     Psychiatric Review of Systems:  Behavior over the last 24 hours:  unchanged  Sleep: normal  Appetite: normal  Medication side effects: No  ROS: no complaints, all others negative    Current Medications:  Current Facility-Administered Medications   Medication Dose Route Frequency    acetaminophen (TYLENOL) tablet 325 mg  325 mg Oral Q6H PRN    acetaminophen (TYLENOL) tablet 975 mg  975 mg Oral Q6H PRN    aluminum-magnesium hydroxide-simethicone (MYLANTA) oral suspension 30 mL  30 mL Oral Q4H PRN    artificial tear (LUBRIFRESH P M ) ophthalmic ointment 1 application  1 application Both Eyes J9E PRN    haloperidol lactate (HALDOL) injection 2 5 mg  2 5 mg Intramuscular Q6H PRN Max 4/day    And    benztropine (COGENTIN) injection 0 5 mg  0 5 mg Intramuscular Q6H PRN Max 4/day    haloperidol lactate (HALDOL) injection 5 mg  5 mg Intramuscular Q4H PRN Max 4/day    And    benztropine (COGENTIN) injection 1 mg  1 mg Intramuscular Q4H PRN Max 4/day    benztropine (COGENTIN) injection 1 mg  1 mg Intramuscular Q4H PRN Max 6/day    benztropine (COGENTIN) tablet 1 mg  1 mg Oral Q4H PRN Max 6/day    diphenhydrAMINE (BENADRYL) tablet 25 mg  25 mg Oral Once PRN    divalproex sodium (DEPAKOTE ER) 24 hr tablet 750 mg 750 mg Oral HS    docusate sodium (COLACE) capsule 100 mg  100 mg Oral BID    folic acid (FOLVITE) tablet 1 mg  1 mg Oral Daily    gabapentin (NEURONTIN) capsule 600 mg  600 mg Oral TID    haloperidol (HALDOL) tablet 2 mg  2 mg Oral Q4H PRN Max 6/day    haloperidol (HALDOL) tablet 5 mg  5 mg Oral Q6H PRN Max 4/day    haloperidol (HALDOL) tablet 5 mg  5 mg Oral Q4H PRN Max 4/day    hydrOXYzine HCL (ATARAX) tablet 25 mg  25 mg Oral Q6H PRN Max 4/day    hydrOXYzine HCL (ATARAX) tablet 50 mg  50 mg Oral Q4H PRN Max 4/day    Or    LORazepam (ATIVAN) injection 1 mg  1 mg Intramuscular Q4H PRN    LORazepam (ATIVAN) tablet 1 mg  1 mg Oral Q4H PRN Max 6/day    Or    LORazepam (ATIVAN) injection 2 mg  2 mg Intramuscular Q6H PRN Max 3/day    melatonin tablet 6 mg  6 mg Oral HS    methocarbamol (ROBAXIN) tablet 500 mg  500 mg Oral Q6H PRN    mirtazapine (REMERON) tablet 15 mg  15 mg Oral HS    multivitamin-minerals (CENTRUM ADULTS) tablet 1 tablet  1 tablet Oral Daily    nicotine (NICODERM CQ) 21 mg/24 hr TD 24 hr patch 1 patch  1 patch Transdermal Daily    nicotine polacrilex (NICORETTE) gum 2 mg  2 mg Oral Q2H PRN    paliperidone (INVEGA) 24 hr tablet 6 mg  6 mg Oral Daily    polyethylene glycol (MIRALAX) packet 17 g  17 g Oral Daily PRN    senna-docusate sodium (SENOKOT S) 8 6-50 mg per tablet 1 tablet  1 tablet Oral Daily PRN    thiamine tablet 100 mg  100 mg Oral Daily    traZODone (DESYREL) tablet 50 mg  50 mg Oral HS PRN       Behavioral Health Medications: all current active meds have been reviewed  Vitals:  Vitals:    04/06/21 1054   BP: 103/58   Pulse: 85   Resp: 15   Temp: 98 7 °F (37 1 °C)   SpO2: 92%       Laboratory results:  I have personally reviewed all pertinent laboratory/tests results      Mental Status Evaluation:  Appearance:  age appropriate   Behavior:  normal   Speech:  normal pitch and normal volume   Mood:  irritable   Affect:  labile and mood-congruent   Language Appropriate Thought Process:  goal directed and logical   Thought Content:  normal   Perceptual Disturbances: None   Risk Potential: Suicidal Ideations none, Homicidal Ideations none and Potential for Aggression No   Sensorium:  person, place, time/date, situation, day of week, month of year and year   Cognition:  recent and remote memory grossly intact   Consciousness:  alert    Attention: attention span appeared shorter than expected for age   Insight:  fair   Judgment: fair   Gait/Station: normal gait/station   Motor Activity: no abnormal movements     Progress Toward Goals: Progressing    Recommended Treatment: Continue with pharmacotherapy, group therapy, milieu therapy and occupational therapy      1  Valproic acid level for tomorrow standing/walking/toileting

## 2021-09-29 NOTE — PROCEDURE NOTE - NSINFORMCONSENT_GEN_A_CORE
This was an emergent procedure.
Benefits, risks, and possible complications of procedure explained to patient/caregiver who verbalized understanding and gave verbal consent.
This was an emergent procedure.
Hourly Rounding

## 2022-01-09 NOTE — ED ADULT TRIAGE NOTE - NS ED TRIAGE AVPU SCALE
Call to patient. Voice mail. Dosing instructions left for patient verbally on voice mail. Clinic number provided and advised to call the clinic with any questions or concerns, medication or dietary changes or if dosing is different than what is listed.     Anticoagulation Summary  As of 2019    INR goal:      TTR:   57.9 % (2.8 y)   Prior goal:   2.0-3.0   INR used for dosin.0 (2019)   Warfarin maintenance plan:   6 mg (2 mg x 3) every M, W, F; 4 mg (2 mg x 2) all other days   Weekly warfarin total:   34 mg   Plan last modified:   Capri Washburn RN (2019)   Next INR check:   2019   Priority:   Follow-Up - 2 Weeks   Target end date:   Indefinite    Indications    Atrial fibrillation  paroxysmal [I48.0]             Anticoagulation Episode Summary     INR check location:   Outside Lab    Preferred lab:       Send INR reminders to:   ANTICOAG (OPEN ENROLLMENT) ACS CARD/EP    Comments:   Next STAC due 19; Home monitor lot#: [id number]; 2 mg tablets; Per Dr. Murdock, please keep INR's 1.8-2.2 (As of 17)          Anticoagulation Care Providers     Provider Role Specialty Phone number    New Murdock MD Responsible Internal Medicine - Clinical Cardiac Electrophysiology [phone]          Supervising provider: YADIRA Márquez   Alert-The patient is alert, awake and responds to voice. The patient is oriented to time, place, and person. The triage nurse is able to obtain subjective information. normal...

## 2022-10-21 NOTE — PROGRESS NOTE ADULT - SUBJECTIVE AND OBJECTIVE BOX
[facility] :     Try daily zyrtec pill and 1% hydrocortisone for itchy neck and review with your derm md and dr cheatham     Labs and see me in June    I have message out to GI about your reflux    [place] blood sugar [place] and urine.   [facility] DIVISION OF KIDNEY DISEASES AND HYPERTENSION -- FOLLOW UP NOTE  --------------------------------------------------------------------------------  Chief Complaint:  ELADIA    24 hour events/subjective:  got lasix drip, tolerated well      PAST HISTORY  --------------------------------------------------------------------------------  No significant changes to PMH, PSH, FHx, SHx, unless otherwise noted    ALLERGIES & MEDICATIONS  --------------------------------------------------------------------------------  Allergies    latex (Unknown)  Sulfacetamide Sodium (Unknown)    Intolerances      Standing Inpatient Medications  ALBUTerol/ipratropium for Nebulization 3 milliLiter(s) Nebulizer every 6 hours  amLODIPine   Tablet 5 milliGRAM(s) Oral daily  aspirin enteric coated 81 milliGRAM(s) Oral daily  atorvastatin 40 milliGRAM(s) Oral at bedtime  clopidogrel Tablet 75 milliGRAM(s) Oral daily  dextrose 5%. 1000 milliLiter(s) IV Continuous <Continuous>  dextrose 50% Injectable 12.5 Gram(s) IV Push once  dextrose 50% Injectable 25 Gram(s) IV Push once  dextrose 50% Injectable 25 Gram(s) IV Push once  finasteride 5 milliGRAM(s) Oral daily  furosemide Infusion 10 mG/Hr IV Continuous <Continuous>  insulin glargine Injectable (LANTUS) 9 Unit(s) SubCutaneous at bedtime  insulin lispro (HumaLOG) corrective regimen sliding scale   SubCutaneous three times a day before meals  insulin lispro (HumaLOG) corrective regimen sliding scale   SubCutaneous at bedtime  nystatin Powder 1 Application(s) Topical two times a day  potassium chloride    Tablet ER 40 milliEquivalent(s) Oral once    PRN Inpatient Medications  dextrose 40% Gel 15 Gram(s) Oral once PRN  glucagon  Injectable 1 milliGRAM(s) IntraMuscular once PRN      REVIEW OF SYSTEMS  --------------------------------------------------------------------------------  Gen: No weight changes, fatigue, fevers/chills, weakness  Skin: No rashes  Head/Eyes/Ears/Mouth: No headache; Normal hearing; Normal vision w/o blurriness; No sinus pain/discomfort, sore throat  Respiratory: No dyspnea, cough, wheezing, hemoptysis  CV: No chest pain, PND, orthopnea  GI: No abdominal pain, diarrhea, constipation, nausea, vomiting, melena, hematochezia  : No increased frequency, dysuria, hematuria, nocturia  MSK: No joint pain/swelling; no back pain; no edema  Neuro: No dizziness/lightheadedness, weakness, seizures, numbness, tingling  Heme: No easy bruising or bleeding  Endo: No heat/cold intolerance  Psych: No significant nervousness, anxiety, stress, depression    All other systems were reviewed and are negative, except as noted.    VITALS/PHYSICAL EXAM  --------------------------------------------------------------------------------  T(C): 36.4 (01-19-19 @ 04:47), Max: 36.6 (01-18-19 @ 20:51)  HR: 65 (01-19-19 @ 06:31) (58 - 66)  BP: 109/74 (01-19-19 @ 04:47) (108/65 - 112/78)  RR: 20 (01-19-19 @ 06:31) (18 - 20)  SpO2: 100% (01-19-19 @ 06:31) (96% - 100%)  Wt(kg): --        01-18-19 @ 07:01  -  01-19-19 @ 07:00  --------------------------------------------------------  IN: 661 mL / OUT: 3000 mL / NET: -2339 mL      Physical Exam:  	Gen: NAD, well-appearing  	HEENT: PERRL, supple neck, clear oropharynx  	Pulm: CTA B/L  	CV: RRR, S1S2; no rub  	Abd: +BS, soft, nontender/nondistended  	: No suprapubic tenderness  	UE: Warm, FROM, no clubbing, intact strength; no edema; no asterixis  	LE: Warm, FROM, no clubbing, intact strength; 1+edema  	Neuro: No focal deficits, intact gait  	Psych: Normal affect and mood  	Skin: Warm, without rashes  	Vascular access:    LABS/STUDIES  --------------------------------------------------------------------------------              9.3    8.65  >-----------<  206      [01-19-19 @ 07:39]              28.3     147  |  114  |  78  ----------------------------<  136      [01-19-19 @ 06:56]  3.3   |  16  |  3.24        Ca     9.1     [01-19-19 @ 06:56]      Mg     2.0     [01-18-19 @ 06:41]      Phos  4.2     [01-17-19 @ 20:25]      PT/INR: PT 32.0 , INR 2.72       [01-19-19 @ 07:42]    CK 48      [01-17-19 @ 20:25]    Creatinine Trend:  SCr 3.24 [01-19 @ 06:56]  SCr 3.27 [01-18 @ 06:41]  SCr 3.31 [01-17 @ 20:25]  SCr 3.54 [01-17 @ 06:12]  SCr 3.66 [01-16 @ 05:49]    Urinalysis - [01-16-19 @ 08:36]      Color Yellow / Appearance Slightly Turbid / SG 1.011 / pH 5.5      Gluc Negative / Ketone Negative  / Bili Negative / Urobili Negative       Blood Small / Protein Negative / Leuk Est Large / Nitrite Negative      RBC 10 /  / Hyaline 0 / Gran  / Sq Epi  / Non Sq Epi 0 / Bacteria Negative    Urine Creatinine 28      [01-16-19 @ 02:23]  Urine Protein 17      [01-16-19 @ 02:23]  Urine Albumin 7.1      [01-16-19 @ 02:23]  Urine Sodium 86      [01-16-19 @ 03:47]  Urine Osmolality 290      [01-16-19 @ 08:36]    HbA1c 5.6      [01-16-19 @ 08:24]

## 2023-10-19 NOTE — PATIENT PROFILE ADULT. - PRO MENTAL HEALTH SX RECENT
none Cibinqo Pregnancy And Lactation Text: It is unknown if this medication will adversely affect pregnancy or breast feeding.  You should not take this medication if you are currently pregnant or planning a pregnancy or while breastfeeding.